# Patient Record
Sex: FEMALE | Race: WHITE
[De-identification: names, ages, dates, MRNs, and addresses within clinical notes are randomized per-mention and may not be internally consistent; named-entity substitution may affect disease eponyms.]

---

## 2017-01-03 ENCOUNTER — APPOINTMENT (OUTPATIENT)
Dept: OBGYN | Facility: CLINIC | Age: 47
End: 2017-01-03

## 2017-01-17 ENCOUNTER — APPOINTMENT (OUTPATIENT)
Dept: OBGYN | Facility: CLINIC | Age: 47
End: 2017-01-17

## 2017-01-18 ENCOUNTER — APPOINTMENT (OUTPATIENT)
Dept: INTERNAL MEDICINE | Facility: CLINIC | Age: 47
End: 2017-01-18

## 2017-01-24 ENCOUNTER — RESULT CHARGE (OUTPATIENT)
Age: 47
End: 2017-01-24

## 2017-01-24 ENCOUNTER — APPOINTMENT (OUTPATIENT)
Dept: OBGYN | Facility: CLINIC | Age: 47
End: 2017-01-24

## 2017-01-24 VITALS
WEIGHT: 151 LBS | SYSTOLIC BLOOD PRESSURE: 120 MMHG | BODY MASS INDEX: 25.78 KG/M2 | HEIGHT: 64 IN | DIASTOLIC BLOOD PRESSURE: 80 MMHG

## 2017-01-24 LAB
HCG UR QL: NEGATIVE
QUALITY CONTROL: YES

## 2017-01-24 RX ORDER — MEDROXYPROGESTERONE ACETATE 150 MG/ML
150 INJECTION, SUSPENSION INTRAMUSCULAR
Qty: 0 | Refills: 0 | Status: COMPLETED | OUTPATIENT
Start: 2017-01-24

## 2017-01-24 RX ADMIN — MEDROXYPROGESTERONE ACETATE 0 MG/ML: 150 INJECTION, SUSPENSION INTRAMUSCULAR at 00:00

## 2017-02-25 ENCOUNTER — APPOINTMENT (OUTPATIENT)
Dept: INTERNAL MEDICINE | Facility: CLINIC | Age: 47
End: 2017-02-25

## 2017-02-25 VITALS
HEART RATE: 88 BPM | WEIGHT: 249 LBS | BODY MASS INDEX: 42.51 KG/M2 | DIASTOLIC BLOOD PRESSURE: 90 MMHG | HEIGHT: 64 IN | SYSTOLIC BLOOD PRESSURE: 140 MMHG

## 2017-03-08 ENCOUNTER — APPOINTMENT (OUTPATIENT)
Dept: ENDOCRINOLOGY | Facility: CLINIC | Age: 47
End: 2017-03-08

## 2017-03-08 VITALS — HEIGHT: 64 IN | WEIGHT: 250 LBS | BODY MASS INDEX: 42.68 KG/M2

## 2017-04-11 ENCOUNTER — APPOINTMENT (OUTPATIENT)
Dept: OBGYN | Facility: CLINIC | Age: 47
End: 2017-04-11

## 2017-04-27 ENCOUNTER — APPOINTMENT (OUTPATIENT)
Dept: INTERNAL MEDICINE | Facility: CLINIC | Age: 47
End: 2017-04-27

## 2017-05-11 ENCOUNTER — APPOINTMENT (OUTPATIENT)
Dept: ENDOCRINOLOGY | Facility: CLINIC | Age: 47
End: 2017-05-11

## 2017-05-15 ENCOUNTER — APPOINTMENT (OUTPATIENT)
Dept: INTERNAL MEDICINE | Facility: CLINIC | Age: 47
End: 2017-05-15

## 2017-05-15 ENCOUNTER — RX RENEWAL (OUTPATIENT)
Age: 47
End: 2017-05-15

## 2017-06-06 ENCOUNTER — OUTPATIENT (OUTPATIENT)
Dept: OUTPATIENT SERVICES | Facility: HOSPITAL | Age: 47
LOS: 1 days | Discharge: HOME | End: 2017-06-06

## 2017-06-06 DIAGNOSIS — E11.9 TYPE 2 DIABETES MELLITUS WITHOUT COMPLICATIONS: ICD-10-CM

## 2017-06-06 DIAGNOSIS — F20.9 SCHIZOPHRENIA, UNSPECIFIED: ICD-10-CM

## 2017-06-06 DIAGNOSIS — F93.0 SEPARATION ANXIETY DISORDER OF CHILDHOOD: ICD-10-CM

## 2017-06-06 DIAGNOSIS — E13.10 OTHER SPECIFIED DIABETES MELLITUS WITH KETOACIDOSIS WITHOUT COMA: ICD-10-CM

## 2017-06-06 DIAGNOSIS — R41.82 ALTERED MENTAL STATUS, UNSPECIFIED: ICD-10-CM

## 2017-06-28 DIAGNOSIS — Z79.899 OTHER LONG TERM (CURRENT) DRUG THERAPY: ICD-10-CM

## 2017-06-28 DIAGNOSIS — F20.9 SCHIZOPHRENIA, UNSPECIFIED: ICD-10-CM

## 2017-07-05 ENCOUNTER — OUTPATIENT (OUTPATIENT)
Dept: OUTPATIENT SERVICES | Facility: HOSPITAL | Age: 47
LOS: 1 days | Discharge: HOME | End: 2017-07-05

## 2017-07-05 DIAGNOSIS — F20.9 SCHIZOPHRENIA, UNSPECIFIED: ICD-10-CM

## 2017-07-05 DIAGNOSIS — Z79.899 OTHER LONG TERM (CURRENT) DRUG THERAPY: ICD-10-CM

## 2017-07-05 DIAGNOSIS — R41.82 ALTERED MENTAL STATUS, UNSPECIFIED: ICD-10-CM

## 2017-07-05 DIAGNOSIS — E13.10 OTHER SPECIFIED DIABETES MELLITUS WITH KETOACIDOSIS WITHOUT COMA: ICD-10-CM

## 2017-07-05 DIAGNOSIS — F93.0 SEPARATION ANXIETY DISORDER OF CHILDHOOD: ICD-10-CM

## 2017-07-05 DIAGNOSIS — E11.9 TYPE 2 DIABETES MELLITUS WITHOUT COMPLICATIONS: ICD-10-CM

## 2017-07-19 ENCOUNTER — APPOINTMENT (OUTPATIENT)
Dept: INTERNAL MEDICINE | Facility: CLINIC | Age: 47
End: 2017-07-19

## 2017-07-19 ENCOUNTER — OUTPATIENT (OUTPATIENT)
Dept: OUTPATIENT SERVICES | Facility: HOSPITAL | Age: 47
LOS: 1 days | Discharge: HOME | End: 2017-07-19

## 2017-07-19 VITALS
WEIGHT: 227 LBS | SYSTOLIC BLOOD PRESSURE: 121 MMHG | HEART RATE: 105 BPM | BODY MASS INDEX: 38.76 KG/M2 | HEIGHT: 64 IN | DIASTOLIC BLOOD PRESSURE: 81 MMHG

## 2017-07-19 DIAGNOSIS — E11.9 TYPE 2 DIABETES MELLITUS WITHOUT COMPLICATIONS: ICD-10-CM

## 2017-07-19 DIAGNOSIS — F20.9 SCHIZOPHRENIA, UNSPECIFIED: ICD-10-CM

## 2017-07-19 DIAGNOSIS — E13.10 OTHER SPECIFIED DIABETES MELLITUS WITH KETOACIDOSIS WITHOUT COMA: ICD-10-CM

## 2017-07-19 DIAGNOSIS — I10 ESSENTIAL (PRIMARY) HYPERTENSION: ICD-10-CM

## 2017-07-19 DIAGNOSIS — R41.82 ALTERED MENTAL STATUS, UNSPECIFIED: ICD-10-CM

## 2017-07-19 DIAGNOSIS — F93.0 SEPARATION ANXIETY DISORDER OF CHILDHOOD: ICD-10-CM

## 2017-07-19 RX ORDER — INSULIN LISPRO 100 [IU]/ML
(75-25) 100 INJECTION, SUSPENSION SUBCUTANEOUS
Qty: 1 | Refills: 5 | Status: DISCONTINUED | COMMUNITY
Start: 2017-03-08 | End: 2017-07-19

## 2017-07-20 DIAGNOSIS — E11.65 TYPE 2 DIABETES MELLITUS WITH HYPERGLYCEMIA: ICD-10-CM

## 2017-07-20 DIAGNOSIS — F20.0 PARANOID SCHIZOPHRENIA: ICD-10-CM

## 2017-07-26 ENCOUNTER — RX RENEWAL (OUTPATIENT)
Age: 47
End: 2017-07-26

## 2017-07-26 RX ORDER — METFORMIN HYDROCHLORIDE 1000 MG/1
1000 TABLET, COATED ORAL
Qty: 60 | Refills: 5 | Status: DISCONTINUED | COMMUNITY
Start: 2017-03-08 | End: 2017-07-26

## 2017-07-28 ENCOUNTER — APPOINTMENT (OUTPATIENT)
Dept: OBGYN | Facility: CLINIC | Age: 47
End: 2017-07-28

## 2017-07-28 ENCOUNTER — OUTPATIENT (OUTPATIENT)
Dept: OUTPATIENT SERVICES | Facility: HOSPITAL | Age: 47
LOS: 1 days | Discharge: HOME | End: 2017-07-28

## 2017-07-28 ENCOUNTER — RESULT CHARGE (OUTPATIENT)
Age: 47
End: 2017-07-28

## 2017-07-28 VITALS
HEIGHT: 63 IN | BODY MASS INDEX: 40.04 KG/M2 | SYSTOLIC BLOOD PRESSURE: 100 MMHG | WEIGHT: 226 LBS | DIASTOLIC BLOOD PRESSURE: 72 MMHG

## 2017-07-28 DIAGNOSIS — F20.9 SCHIZOPHRENIA, UNSPECIFIED: ICD-10-CM

## 2017-07-28 DIAGNOSIS — E11.9 TYPE 2 DIABETES MELLITUS WITHOUT COMPLICATIONS: ICD-10-CM

## 2017-07-28 DIAGNOSIS — R41.82 ALTERED MENTAL STATUS, UNSPECIFIED: ICD-10-CM

## 2017-07-28 DIAGNOSIS — F93.0 SEPARATION ANXIETY DISORDER OF CHILDHOOD: ICD-10-CM

## 2017-07-28 DIAGNOSIS — E13.10 OTHER SPECIFIED DIABETES MELLITUS WITH KETOACIDOSIS WITHOUT COMA: ICD-10-CM

## 2017-07-29 LAB
HCG UR QL: NEGATIVE
QUALITY CONTROL: YES

## 2017-08-02 ENCOUNTER — OUTPATIENT (OUTPATIENT)
Dept: OUTPATIENT SERVICES | Facility: HOSPITAL | Age: 47
LOS: 1 days | Discharge: HOME | End: 2017-08-02

## 2017-08-02 DIAGNOSIS — F20.9 SCHIZOPHRENIA, UNSPECIFIED: ICD-10-CM

## 2017-08-02 DIAGNOSIS — Z79.899 OTHER LONG TERM (CURRENT) DRUG THERAPY: ICD-10-CM

## 2017-08-02 DIAGNOSIS — F93.0 SEPARATION ANXIETY DISORDER OF CHILDHOOD: ICD-10-CM

## 2017-08-02 DIAGNOSIS — E13.10 OTHER SPECIFIED DIABETES MELLITUS WITH KETOACIDOSIS WITHOUT COMA: ICD-10-CM

## 2017-08-02 DIAGNOSIS — R41.82 ALTERED MENTAL STATUS, UNSPECIFIED: ICD-10-CM

## 2017-08-02 DIAGNOSIS — E11.9 TYPE 2 DIABETES MELLITUS WITHOUT COMPLICATIONS: ICD-10-CM

## 2017-08-03 LAB
A VAGINAE DNA VAG NAA+PROBE-LOG#: NOT DETECTED
BV SCORE VAG QL NAA+PROBE: ABNORMAL
C GLABRATA DNA VAG QL NAA+PROBE: DETECTED
C PARAP DNA VAG QL NAA+PROBE: NOT DETECTED
C TRACH RRNA SPEC QL NAA+PROBE: NOT DETECTED
C TROPICLS DNA VAG QL NAA+PROBE: NOT DETECTED
CANDIDA DNA VAG QL NAA+PROBE: DETECTED
G VAGINALIS DNA VAG NAA+PROBE-LOG#: 6.6
LACTOBACILLUS DNA VAG NAA+PROBE-LOG#: 7
MEGASPHAERA SP DNA VAG NAA+PROBE-LOG#: NOT DETECTED
N GONORRHOEA RRNA SPEC QL NAA+PROBE: NOT DETECTED
T VAGINALIS RRNA SPEC QL NAA+PROBE: NOT DETECTED

## 2017-08-25 ENCOUNTER — RX RENEWAL (OUTPATIENT)
Age: 47
End: 2017-08-25

## 2017-08-25 RX ORDER — METFORMIN HYDROCHLORIDE 1000 MG/1
1000 TABLET, EXTENDED RELEASE ORAL
Qty: 30 | Refills: 3 | Status: DISCONTINUED | COMMUNITY
Start: 2017-07-26 | End: 2017-08-25

## 2017-08-30 ENCOUNTER — OUTPATIENT (OUTPATIENT)
Dept: OUTPATIENT SERVICES | Facility: HOSPITAL | Age: 47
LOS: 1 days | Discharge: HOME | End: 2017-08-30

## 2017-08-30 DIAGNOSIS — E11.9 TYPE 2 DIABETES MELLITUS WITHOUT COMPLICATIONS: ICD-10-CM

## 2017-08-30 DIAGNOSIS — F20.9 SCHIZOPHRENIA, UNSPECIFIED: ICD-10-CM

## 2017-08-30 DIAGNOSIS — Z79.899 OTHER LONG TERM (CURRENT) DRUG THERAPY: ICD-10-CM

## 2017-08-30 DIAGNOSIS — E13.10 OTHER SPECIFIED DIABETES MELLITUS WITH KETOACIDOSIS WITHOUT COMA: ICD-10-CM

## 2017-08-30 DIAGNOSIS — F93.0 SEPARATION ANXIETY DISORDER OF CHILDHOOD: ICD-10-CM

## 2017-08-30 DIAGNOSIS — R41.82 ALTERED MENTAL STATUS, UNSPECIFIED: ICD-10-CM

## 2017-09-06 ENCOUNTER — OUTPATIENT (OUTPATIENT)
Dept: OUTPATIENT SERVICES | Facility: HOSPITAL | Age: 47
LOS: 1 days | Discharge: HOME | End: 2017-09-06

## 2017-09-06 ENCOUNTER — APPOINTMENT (OUTPATIENT)
Dept: ENDOCRINOLOGY | Facility: CLINIC | Age: 47
End: 2017-09-06

## 2017-09-06 DIAGNOSIS — F93.0 SEPARATION ANXIETY DISORDER OF CHILDHOOD: ICD-10-CM

## 2017-09-06 DIAGNOSIS — F20.9 SCHIZOPHRENIA, UNSPECIFIED: ICD-10-CM

## 2017-09-06 DIAGNOSIS — E11.9 TYPE 2 DIABETES MELLITUS WITHOUT COMPLICATIONS: ICD-10-CM

## 2017-09-06 DIAGNOSIS — R41.82 ALTERED MENTAL STATUS, UNSPECIFIED: ICD-10-CM

## 2017-09-06 DIAGNOSIS — Z12.31 ENCOUNTER FOR SCREENING MAMMOGRAM FOR MALIGNANT NEOPLASM OF BREAST: ICD-10-CM

## 2017-09-06 DIAGNOSIS — E13.10 OTHER SPECIFIED DIABETES MELLITUS WITH KETOACIDOSIS WITHOUT COMA: ICD-10-CM

## 2017-09-12 ENCOUNTER — FORM ENCOUNTER (OUTPATIENT)
Age: 47
End: 2017-09-12

## 2017-09-13 ENCOUNTER — OUTPATIENT (OUTPATIENT)
Dept: OUTPATIENT SERVICES | Facility: HOSPITAL | Age: 47
LOS: 1 days | Discharge: HOME | End: 2017-09-13

## 2017-09-13 DIAGNOSIS — F93.0 SEPARATION ANXIETY DISORDER OF CHILDHOOD: ICD-10-CM

## 2017-09-13 DIAGNOSIS — R92.8 OTHER ABNORMAL AND INCONCLUSIVE FINDINGS ON DIAGNOSTIC IMAGING OF BREAST: ICD-10-CM

## 2017-09-13 DIAGNOSIS — E13.10 OTHER SPECIFIED DIABETES MELLITUS WITH KETOACIDOSIS WITHOUT COMA: ICD-10-CM

## 2017-09-13 DIAGNOSIS — F20.9 SCHIZOPHRENIA, UNSPECIFIED: ICD-10-CM

## 2017-09-13 DIAGNOSIS — R41.82 ALTERED MENTAL STATUS, UNSPECIFIED: ICD-10-CM

## 2017-09-13 DIAGNOSIS — E11.9 TYPE 2 DIABETES MELLITUS WITHOUT COMPLICATIONS: ICD-10-CM

## 2017-09-27 ENCOUNTER — OUTPATIENT (OUTPATIENT)
Dept: OUTPATIENT SERVICES | Facility: HOSPITAL | Age: 47
LOS: 1 days | Discharge: HOME | End: 2017-09-27

## 2017-09-27 DIAGNOSIS — F20.9 SCHIZOPHRENIA, UNSPECIFIED: ICD-10-CM

## 2017-09-27 DIAGNOSIS — E13.10 OTHER SPECIFIED DIABETES MELLITUS WITH KETOACIDOSIS WITHOUT COMA: ICD-10-CM

## 2017-09-27 DIAGNOSIS — F93.0 SEPARATION ANXIETY DISORDER OF CHILDHOOD: ICD-10-CM

## 2017-09-27 DIAGNOSIS — E11.9 TYPE 2 DIABETES MELLITUS WITHOUT COMPLICATIONS: ICD-10-CM

## 2017-09-27 DIAGNOSIS — R41.82 ALTERED MENTAL STATUS, UNSPECIFIED: ICD-10-CM

## 2017-09-28 ENCOUNTER — EMERGENCY (EMERGENCY)
Facility: HOSPITAL | Age: 47
LOS: 0 days | Discharge: HOME | End: 2017-09-28
Admitting: DERMATOLOGY

## 2017-09-28 DIAGNOSIS — E78.00 PURE HYPERCHOLESTEROLEMIA, UNSPECIFIED: ICD-10-CM

## 2017-09-28 DIAGNOSIS — Z88.6 ALLERGY STATUS TO ANALGESIC AGENT: ICD-10-CM

## 2017-09-28 DIAGNOSIS — R41.82 ALTERED MENTAL STATUS, UNSPECIFIED: ICD-10-CM

## 2017-09-28 DIAGNOSIS — F20.9 SCHIZOPHRENIA, UNSPECIFIED: ICD-10-CM

## 2017-09-28 DIAGNOSIS — F32.9 MAJOR DEPRESSIVE DISORDER, SINGLE EPISODE, UNSPECIFIED: ICD-10-CM

## 2017-09-28 DIAGNOSIS — E11.9 TYPE 2 DIABETES MELLITUS WITHOUT COMPLICATIONS: ICD-10-CM

## 2017-09-28 DIAGNOSIS — E11.65 TYPE 2 DIABETES MELLITUS WITH HYPERGLYCEMIA: ICD-10-CM

## 2017-09-28 DIAGNOSIS — Z88.8 ALLERGY STATUS TO OTHER DRUGS, MEDICAMENTS AND BIOLOGICAL SUBSTANCES STATUS: ICD-10-CM

## 2017-09-28 DIAGNOSIS — F93.0 SEPARATION ANXIETY DISORDER OF CHILDHOOD: ICD-10-CM

## 2017-09-28 DIAGNOSIS — Z88.5 ALLERGY STATUS TO NARCOTIC AGENT: ICD-10-CM

## 2017-09-28 DIAGNOSIS — E13.10 OTHER SPECIFIED DIABETES MELLITUS WITH KETOACIDOSIS WITHOUT COMA: ICD-10-CM

## 2017-09-28 DIAGNOSIS — Z87.891 PERSONAL HISTORY OF NICOTINE DEPENDENCE: ICD-10-CM

## 2017-09-28 DIAGNOSIS — Z88.0 ALLERGY STATUS TO PENICILLIN: ICD-10-CM

## 2017-10-05 DIAGNOSIS — Z79.899 OTHER LONG TERM (CURRENT) DRUG THERAPY: ICD-10-CM

## 2017-10-05 DIAGNOSIS — F20.9 SCHIZOPHRENIA, UNSPECIFIED: ICD-10-CM

## 2017-10-20 ENCOUNTER — OUTPATIENT (OUTPATIENT)
Dept: OUTPATIENT SERVICES | Facility: HOSPITAL | Age: 47
LOS: 1 days | Discharge: HOME | End: 2017-10-20

## 2017-10-20 ENCOUNTER — APPOINTMENT (OUTPATIENT)
Dept: OBGYN | Facility: CLINIC | Age: 47
End: 2017-10-20

## 2017-10-20 VITALS
WEIGHT: 217.2 LBS | HEIGHT: 63 IN | BODY MASS INDEX: 38.48 KG/M2 | DIASTOLIC BLOOD PRESSURE: 70 MMHG | SYSTOLIC BLOOD PRESSURE: 100 MMHG

## 2017-10-20 DIAGNOSIS — F93.0 SEPARATION ANXIETY DISORDER OF CHILDHOOD: ICD-10-CM

## 2017-10-20 DIAGNOSIS — E13.10 OTHER SPECIFIED DIABETES MELLITUS WITH KETOACIDOSIS WITHOUT COMA: ICD-10-CM

## 2017-10-20 DIAGNOSIS — E11.9 TYPE 2 DIABETES MELLITUS WITHOUT COMPLICATIONS: ICD-10-CM

## 2017-10-20 DIAGNOSIS — R41.82 ALTERED MENTAL STATUS, UNSPECIFIED: ICD-10-CM

## 2017-10-20 DIAGNOSIS — F20.9 SCHIZOPHRENIA, UNSPECIFIED: ICD-10-CM

## 2017-10-23 ENCOUNTER — EMERGENCY (EMERGENCY)
Facility: HOSPITAL | Age: 47
LOS: 1 days | Discharge: HOME | End: 2017-10-23
Admitting: DERMATOLOGY

## 2017-10-23 ENCOUNTER — MOBILE ON CALL (OUTPATIENT)
Age: 47
End: 2017-10-23

## 2017-10-23 DIAGNOSIS — E11.9 TYPE 2 DIABETES MELLITUS WITHOUT COMPLICATIONS: ICD-10-CM

## 2017-10-23 DIAGNOSIS — Z02.9 ENCOUNTER FOR ADMINISTRATIVE EXAMINATIONS, UNSPECIFIED: ICD-10-CM

## 2017-10-23 DIAGNOSIS — Z88.0 ALLERGY STATUS TO PENICILLIN: ICD-10-CM

## 2017-10-23 DIAGNOSIS — E11.65 TYPE 2 DIABETES MELLITUS WITH HYPERGLYCEMIA: ICD-10-CM

## 2017-10-23 DIAGNOSIS — Z88.8 ALLERGY STATUS TO OTHER DRUGS, MEDICAMENTS AND BIOLOGICAL SUBSTANCES: ICD-10-CM

## 2017-10-23 DIAGNOSIS — F20.9 SCHIZOPHRENIA, UNSPECIFIED: ICD-10-CM

## 2017-10-23 DIAGNOSIS — E78.00 PURE HYPERCHOLESTEROLEMIA, UNSPECIFIED: ICD-10-CM

## 2017-10-23 DIAGNOSIS — R00.0 TACHYCARDIA, UNSPECIFIED: ICD-10-CM

## 2017-10-23 DIAGNOSIS — J45.909 UNSPECIFIED ASTHMA, UNCOMPLICATED: ICD-10-CM

## 2017-10-23 DIAGNOSIS — R41.82 ALTERED MENTAL STATUS, UNSPECIFIED: ICD-10-CM

## 2017-10-23 DIAGNOSIS — F93.0 SEPARATION ANXIETY DISORDER OF CHILDHOOD: ICD-10-CM

## 2017-10-23 DIAGNOSIS — Z88.6 ALLERGY STATUS TO ANALGESIC AGENT: ICD-10-CM

## 2017-10-23 DIAGNOSIS — Z79.899 OTHER LONG TERM (CURRENT) DRUG THERAPY: ICD-10-CM

## 2017-10-23 DIAGNOSIS — E13.10 OTHER SPECIFIED DIABETES MELLITUS WITH KETOACIDOSIS WITHOUT COMA: ICD-10-CM

## 2017-10-23 DIAGNOSIS — F32.9 MAJOR DEPRESSIVE DISORDER, SINGLE EPISODE, UNSPECIFIED: ICD-10-CM

## 2017-10-23 DIAGNOSIS — Z79.84 LONG TERM (CURRENT) USE OF ORAL HYPOGLYCEMIC DRUGS: ICD-10-CM

## 2017-10-23 DIAGNOSIS — I10 ESSENTIAL (PRIMARY) HYPERTENSION: ICD-10-CM

## 2017-10-24 LAB
ALBUMIN SERPL-MCNC: 4.1 G/DL
ALBUMIN/GLOB SERPL: 1.41
ALP SERPL-CCNC: 93 IU/L
ALT SERPL-CCNC: 18 IU/L
ANION GAP SERPL CALC-SCNC: 15 MEQ/L
AST SERPL-CCNC: 19 IU/L
BASOPHILS # BLD: 0.04 TH/MM3
BASOPHILS NFR BLD: 0.4 %
BILIRUB SERPL-MCNC: 0.7 MG/DL
BUN SERPL-MCNC: 11 MG/DL
BUN/CREAT SERPL: 10.6 %
CALCIUM SERPL-MCNC: 10.4 MG/DL
CHLORIDE SERPL-SCNC: 99 MEQ/L
CHOLEST SERPL-MCNC: 250 MG/DL
CO2 SERPL-SCNC: 21 MEQ/L
CREAT SERPL-MCNC: 1.04 MG/DL
CREAT UR-MCNC: 95 MG/DL
DIFFERENTIAL METHOD BLD: NORMAL
EOSINOPHIL # BLD: 0.22 TH/MM3
EOSINOPHIL NFR BLD: 2 %
ERYTHROCYTE [DISTWIDTH] IN BLOOD BY AUTOMATED COUNT: 13.7 %
ESTIMATED AVERGAGE GLUCOSE (NORTH): 418 MG/DL
GFR SERPL CREATININE-BSD FRML MDRD: 57
GLUCOSE SERPL-MCNC: 424 MG/DL
GRANULOCYTES # BLD: 7.58 TH/MM3
GRANULOCYTES NFR BLD: 68.2 %
HBA1C MFR BLD: 16.2 %
HCT VFR BLD AUTO: 44.4 %
HCV AB S/CO SERPL IA: 0.1 S/CO
HCV AB SER QL: NONREACTIVE
HDLC SERPL-MCNC: 44 MG/DL
HDLC SERPL: 5.68
HGB BLD-MCNC: 14.5 G/DL
IMM GRANULOCYTES # BLD: 0.05 TH/MM3
IMM GRANULOCYTES NFR BLD: 0.5 %
LDLC SERPL DIRECT ASSAY-MCNC: 131 MG/DL
LYMPHOCYTES # BLD: 2.61 TH/MM3
LYMPHOCYTES NFR BLD: 23.5 %
MCH RBC QN AUTO: 28.7 PG
MCHC RBC AUTO-ENTMCNC: 32.7 G/DL
MCV RBC AUTO: 87.9 FL
MICROALBUMIN UR-MCNC: 3 MG/DL
MICROALBUMIN/CREAT UR-RTO: 32 MG/G
MONOCYTES # BLD: 0.6 TH/MM3
MONOCYTES NFR BLD: 5.4 %
PLATELET # BLD: 431 TH/MM3
PMV BLD AUTO: 10.8 FL
POTASSIUM SERPL-SCNC: 4.5 MMOL/L
PROT SERPL-MCNC: 7 G/DL
RBC # BLD AUTO: 5.05 MIL/MM3
SODIUM SERPL-SCNC: 135 MEQ/L
TRIGL SERPL-MCNC: 424 MG/DL
VLDLC SERPL-MCNC: 84 MG/DL
WBC # BLD: 11.1 TH/MM3

## 2017-10-25 ENCOUNTER — APPOINTMENT (OUTPATIENT)
Dept: INTERNAL MEDICINE | Facility: CLINIC | Age: 47
End: 2017-10-25

## 2017-10-25 ENCOUNTER — RESULT CHARGE (OUTPATIENT)
Age: 47
End: 2017-10-25

## 2017-10-25 ENCOUNTER — OUTPATIENT (OUTPATIENT)
Dept: OUTPATIENT SERVICES | Facility: HOSPITAL | Age: 47
LOS: 1 days | Discharge: HOME | End: 2017-10-25

## 2017-10-25 VITALS
BODY MASS INDEX: 38.09 KG/M2 | HEART RATE: 128 BPM | DIASTOLIC BLOOD PRESSURE: 82 MMHG | HEIGHT: 63 IN | WEIGHT: 215 LBS | SYSTOLIC BLOOD PRESSURE: 116 MMHG

## 2017-10-25 DIAGNOSIS — E11.9 TYPE 2 DIABETES MELLITUS WITHOUT COMPLICATIONS: ICD-10-CM

## 2017-10-25 DIAGNOSIS — R41.82 ALTERED MENTAL STATUS, UNSPECIFIED: ICD-10-CM

## 2017-10-25 DIAGNOSIS — E13.10 OTHER SPECIFIED DIABETES MELLITUS WITH KETOACIDOSIS WITHOUT COMA: ICD-10-CM

## 2017-10-25 DIAGNOSIS — F20.9 SCHIZOPHRENIA, UNSPECIFIED: ICD-10-CM

## 2017-10-25 DIAGNOSIS — F93.0 SEPARATION ANXIETY DISORDER OF CHILDHOOD: ICD-10-CM

## 2017-10-25 RX ORDER — FLUCONAZOLE 150 MG/1
150 TABLET ORAL
Qty: 2 | Refills: 2 | Status: DISCONTINUED | COMMUNITY
Start: 2017-10-20 | End: 2017-10-25

## 2017-10-25 RX ORDER — DOCUSATE SODIUM 100 MG/1
100 CAPSULE ORAL
Qty: 30 | Refills: 3 | Status: ACTIVE | COMMUNITY
Start: 2017-10-25 | End: 1900-01-01

## 2017-10-26 ENCOUNTER — RESULT REVIEW (OUTPATIENT)
Age: 47
End: 2017-10-26

## 2017-10-26 ENCOUNTER — APPOINTMENT (OUTPATIENT)
Dept: ENDOCRINOLOGY | Facility: CLINIC | Age: 47
End: 2017-10-26

## 2017-10-26 ENCOUNTER — OUTPATIENT (OUTPATIENT)
Dept: OUTPATIENT SERVICES | Facility: HOSPITAL | Age: 47
LOS: 1 days | Discharge: HOME | End: 2017-10-26

## 2017-10-26 VITALS
BODY MASS INDEX: 38.09 KG/M2 | HEART RATE: 105 BPM | SYSTOLIC BLOOD PRESSURE: 111 MMHG | WEIGHT: 215 LBS | DIASTOLIC BLOOD PRESSURE: 80 MMHG | HEIGHT: 63 IN

## 2017-10-26 DIAGNOSIS — F20.9 SCHIZOPHRENIA, UNSPECIFIED: ICD-10-CM

## 2017-10-26 DIAGNOSIS — E11.65 TYPE 2 DIABETES MELLITUS WITH HYPERGLYCEMIA: ICD-10-CM

## 2017-10-26 DIAGNOSIS — Z23 ENCOUNTER FOR IMMUNIZATION: ICD-10-CM

## 2017-10-26 DIAGNOSIS — E11.9 TYPE 2 DIABETES MELLITUS WITHOUT COMPLICATIONS: ICD-10-CM

## 2017-10-26 DIAGNOSIS — E13.10 OTHER SPECIFIED DIABETES MELLITUS WITH KETOACIDOSIS WITHOUT COMA: ICD-10-CM

## 2017-10-26 DIAGNOSIS — I10 ESSENTIAL (PRIMARY) HYPERTENSION: ICD-10-CM

## 2017-10-26 DIAGNOSIS — R41.82 ALTERED MENTAL STATUS, UNSPECIFIED: ICD-10-CM

## 2017-10-26 DIAGNOSIS — F93.0 SEPARATION ANXIETY DISORDER OF CHILDHOOD: ICD-10-CM

## 2017-10-26 DIAGNOSIS — F20.0 PARANOID SCHIZOPHRENIA: ICD-10-CM

## 2017-10-26 LAB
25(OH)D3 SERPL-MCNC: 38 NG/ML
VITAMIN D2 SERPL-MCNC: 10 NG/ML
VITAMIN D3 SERPL-MCNC: 28 NG/ML

## 2017-10-26 RX ORDER — FLUPHENAZINE DECANOATE 25 MG/ML
25 INJECTION, SOLUTION INTRAMUSCULAR; SUBCUTANEOUS
Refills: 0 | Status: ACTIVE | COMMUNITY
Start: 2017-06-27

## 2017-10-26 RX ORDER — TERCONAZOLE 8 MG/G
0.8 CREAM VAGINAL
Qty: 5 | Refills: 2 | Status: COMPLETED | COMMUNITY
Start: 2017-07-28 | End: 2017-08-26

## 2017-10-27 LAB — GLUCOSE BLDC GLUCOMTR-MCNC: 295

## 2017-11-03 LAB — GLUCOSE SERPL-MCNC: 295 MG/DL

## 2017-11-06 DIAGNOSIS — E66.01 MORBID (SEVERE) OBESITY DUE TO EXCESS CALORIES: ICD-10-CM

## 2017-11-06 DIAGNOSIS — E11.65 TYPE 2 DIABETES MELLITUS WITH HYPERGLYCEMIA: ICD-10-CM

## 2017-11-10 ENCOUNTER — OTHER (OUTPATIENT)
Age: 47
End: 2017-11-10

## 2017-11-21 ENCOUNTER — OUTPATIENT (OUTPATIENT)
Dept: OUTPATIENT SERVICES | Facility: HOSPITAL | Age: 47
LOS: 1 days | Discharge: HOME | End: 2017-11-21

## 2017-11-21 DIAGNOSIS — F93.0 SEPARATION ANXIETY DISORDER OF CHILDHOOD: ICD-10-CM

## 2017-11-21 DIAGNOSIS — E11.9 TYPE 2 DIABETES MELLITUS WITHOUT COMPLICATIONS: ICD-10-CM

## 2017-11-21 DIAGNOSIS — F20.9 SCHIZOPHRENIA, UNSPECIFIED: ICD-10-CM

## 2017-11-21 DIAGNOSIS — Z79.899 OTHER LONG TERM (CURRENT) DRUG THERAPY: ICD-10-CM

## 2017-11-21 DIAGNOSIS — R41.82 ALTERED MENTAL STATUS, UNSPECIFIED: ICD-10-CM

## 2017-11-21 DIAGNOSIS — E13.10 OTHER SPECIFIED DIABETES MELLITUS WITH KETOACIDOSIS WITHOUT COMA: ICD-10-CM

## 2017-12-08 ENCOUNTER — APPOINTMENT (OUTPATIENT)
Dept: PODIATRY | Facility: CLINIC | Age: 47
End: 2017-12-08

## 2017-12-15 RX ORDER — DULAGLUTIDE 0.75 MG/.5ML
0.75 INJECTION, SOLUTION SUBCUTANEOUS
Qty: 1 | Refills: 5 | Status: DISCONTINUED | COMMUNITY
Start: 2017-10-26 | End: 2017-12-15

## 2017-12-20 ENCOUNTER — OUTPATIENT (OUTPATIENT)
Dept: OUTPATIENT SERVICES | Facility: HOSPITAL | Age: 47
LOS: 1 days | Discharge: HOME | End: 2017-12-20

## 2017-12-20 DIAGNOSIS — E11.9 TYPE 2 DIABETES MELLITUS WITHOUT COMPLICATIONS: ICD-10-CM

## 2017-12-20 DIAGNOSIS — Z79.899 OTHER LONG TERM (CURRENT) DRUG THERAPY: ICD-10-CM

## 2017-12-20 DIAGNOSIS — F20.9 SCHIZOPHRENIA, UNSPECIFIED: ICD-10-CM

## 2017-12-20 DIAGNOSIS — E13.10 OTHER SPECIFIED DIABETES MELLITUS WITH KETOACIDOSIS WITHOUT COMA: ICD-10-CM

## 2017-12-20 DIAGNOSIS — R41.82 ALTERED MENTAL STATUS, UNSPECIFIED: ICD-10-CM

## 2017-12-20 DIAGNOSIS — F93.0 SEPARATION ANXIETY DISORDER OF CHILDHOOD: ICD-10-CM

## 2018-01-17 ENCOUNTER — OUTPATIENT (OUTPATIENT)
Dept: OUTPATIENT SERVICES | Facility: HOSPITAL | Age: 48
LOS: 1 days | Discharge: HOME | End: 2018-01-17

## 2018-01-17 DIAGNOSIS — F93.0 SEPARATION ANXIETY DISORDER OF CHILDHOOD: ICD-10-CM

## 2018-01-17 DIAGNOSIS — R41.82 ALTERED MENTAL STATUS, UNSPECIFIED: ICD-10-CM

## 2018-01-17 DIAGNOSIS — Z79.899 OTHER LONG TERM (CURRENT) DRUG THERAPY: ICD-10-CM

## 2018-01-17 DIAGNOSIS — F20.9 SCHIZOPHRENIA, UNSPECIFIED: ICD-10-CM

## 2018-01-17 DIAGNOSIS — E11.9 TYPE 2 DIABETES MELLITUS WITHOUT COMPLICATIONS: ICD-10-CM

## 2018-01-17 DIAGNOSIS — E13.10 OTHER SPECIFIED DIABETES MELLITUS WITH KETOACIDOSIS WITHOUT COMA: ICD-10-CM

## 2018-01-19 ENCOUNTER — OUTPATIENT (OUTPATIENT)
Dept: OUTPATIENT SERVICES | Facility: HOSPITAL | Age: 48
LOS: 1 days | Discharge: HOME | End: 2018-01-19

## 2018-01-19 ENCOUNTER — APPOINTMENT (OUTPATIENT)
Dept: OBGYN | Facility: CLINIC | Age: 48
End: 2018-01-19

## 2018-01-19 VITALS
DIASTOLIC BLOOD PRESSURE: 80 MMHG | SYSTOLIC BLOOD PRESSURE: 110 MMHG | WEIGHT: 220.6 LBS | HEIGHT: 63 IN | BODY MASS INDEX: 39.09 KG/M2

## 2018-01-19 DIAGNOSIS — E11.9 TYPE 2 DIABETES MELLITUS WITHOUT COMPLICATIONS: ICD-10-CM

## 2018-01-19 DIAGNOSIS — Z79.899 OTHER LONG TERM (CURRENT) DRUG THERAPY: ICD-10-CM

## 2018-01-19 DIAGNOSIS — R41.82 ALTERED MENTAL STATUS, UNSPECIFIED: ICD-10-CM

## 2018-01-19 DIAGNOSIS — F20.9 SCHIZOPHRENIA, UNSPECIFIED: ICD-10-CM

## 2018-01-19 DIAGNOSIS — E13.10 OTHER SPECIFIED DIABETES MELLITUS WITH KETOACIDOSIS WITHOUT COMA: ICD-10-CM

## 2018-01-19 DIAGNOSIS — Z30.9 ENCOUNTER FOR CONTRACEPTIVE MANAGEMENT, UNSPECIFIED: ICD-10-CM

## 2018-01-19 DIAGNOSIS — F93.0 SEPARATION ANXIETY DISORDER OF CHILDHOOD: ICD-10-CM

## 2018-01-19 RX ORDER — MEDROXYPROGESTERONE ACETATE 150 MG/ML
150 INJECTION, SUSPENSION INTRAMUSCULAR
Qty: 0 | Refills: 0 | Status: COMPLETED | OUTPATIENT
Start: 2018-01-19

## 2018-01-19 RX ADMIN — MEDROXYPROGESTERONE ACETATE 0 MG/ML: 150 INJECTION, SUSPENSION INTRAMUSCULAR at 00:00

## 2018-01-24 ENCOUNTER — APPOINTMENT (OUTPATIENT)
Dept: ENDOCRINOLOGY | Facility: CLINIC | Age: 48
End: 2018-01-24

## 2018-01-24 ENCOUNTER — RESULT REVIEW (OUTPATIENT)
Age: 48
End: 2018-01-24

## 2018-01-24 ENCOUNTER — OUTPATIENT (OUTPATIENT)
Dept: OUTPATIENT SERVICES | Facility: HOSPITAL | Age: 48
LOS: 1 days | Discharge: HOME | End: 2018-01-24

## 2018-01-24 VITALS
HEART RATE: 116 BPM | DIASTOLIC BLOOD PRESSURE: 78 MMHG | BODY MASS INDEX: 38.62 KG/M2 | SYSTOLIC BLOOD PRESSURE: 114 MMHG | WEIGHT: 218 LBS | HEIGHT: 63 IN

## 2018-01-24 LAB — CYTOLOGY CVX/VAG DOC THIN PREP: NORMAL

## 2018-01-24 RX ORDER — DULAGLUTIDE 1.5 MG/.5ML
1.5 INJECTION, SOLUTION SUBCUTANEOUS
Qty: 3 | Refills: 3 | Status: COMPLETED | COMMUNITY
Start: 2017-10-26 | End: 2018-01-24

## 2018-01-24 RX ORDER — GLIMEPIRIDE 1 MG/1
1 TABLET ORAL DAILY
Qty: 90 | Refills: 0 | Status: DISCONTINUED | COMMUNITY
Start: 2017-10-24 | End: 2018-01-24

## 2018-01-31 ENCOUNTER — APPOINTMENT (OUTPATIENT)
Dept: INTERNAL MEDICINE | Facility: CLINIC | Age: 48
End: 2018-01-31

## 2018-01-31 ENCOUNTER — OUTPATIENT (OUTPATIENT)
Dept: OUTPATIENT SERVICES | Facility: HOSPITAL | Age: 48
LOS: 1 days | Discharge: HOME | End: 2018-01-31

## 2018-01-31 VITALS
HEIGHT: 63 IN | WEIGHT: 219 LBS | HEART RATE: 111 BPM | BODY MASS INDEX: 38.8 KG/M2 | DIASTOLIC BLOOD PRESSURE: 85 MMHG | SYSTOLIC BLOOD PRESSURE: 126 MMHG

## 2018-02-01 DIAGNOSIS — E66.01 MORBID (SEVERE) OBESITY DUE TO EXCESS CALORIES: ICD-10-CM

## 2018-02-01 DIAGNOSIS — E78.2 MIXED HYPERLIPIDEMIA: ICD-10-CM

## 2018-02-01 DIAGNOSIS — E11.65 TYPE 2 DIABETES MELLITUS WITH HYPERGLYCEMIA: ICD-10-CM

## 2018-02-02 LAB
ALBUMIN SERPL-MCNC: 3.9 G/DL
ALBUMIN/GLOB SERPL: 1.3
ALP SERPL-CCNC: 87 IU/L
ALT SERPL-CCNC: 22 IU/L
ANION GAP SERPL CALC-SCNC: 11 MEQ/L
AST SERPL-CCNC: 22 IU/L
BASOPHILS # BLD: 0.04 TH/MM3
BASOPHILS NFR BLD: 0.3 %
BILIRUB SERPL-MCNC: 0.7 MG/DL
BUN SERPL-MCNC: 13 MG/DL
BUN/CREAT SERPL: 13.4 %
CALCIUM SERPL-MCNC: 10.2 MG/DL
CHLORIDE SERPL-SCNC: 102 MEQ/L
CHOLEST SERPL-MCNC: 152 MG/DL
CO2 SERPL-SCNC: 24 MEQ/L
CREAT SERPL-MCNC: 0.97 MG/DL
CREAT UR-MCNC: 354 MG/DL
DIFFERENTIAL METHOD BLD: NORMAL
EOSINOPHIL # BLD: 0.1 TH/MM3
EOSINOPHIL NFR BLD: 0.7 %
ERYTHROCYTE [DISTWIDTH] IN BLOOD BY AUTOMATED COUNT: 13.8 %
ESTIMATED AVERGAGE GLUCOSE (NORTH): 272 MG/DL
GFR SERPL CREATININE-BSD FRML MDRD: 62
GLUCOSE SERPL-MCNC: 197 MG/DL
GRANULOCYTES # BLD: 10.08 TH/MM3
GRANULOCYTES NFR BLD: 72 %
HBA1C MFR BLD: 11.1 %
HCT VFR BLD AUTO: 46.6 %
HDLC SERPL-MCNC: 40 MG/DL
HDLC SERPL: 3.8
HGB BLD-MCNC: 14.7 G/DL
IMM GRANULOCYTES # BLD: 0.04 TH/MM3
IMM GRANULOCYTES NFR BLD: 0.3 %
LDLC SERPL DIRECT ASSAY-MCNC: 77 MG/DL
LYMPHOCYTES # BLD: 3.05 TH/MM3
LYMPHOCYTES NFR BLD: 21.8 %
MCH RBC QN AUTO: 28.7 PG
MCHC RBC AUTO-ENTMCNC: 31.5 G/DL
MCV RBC AUTO: 90.8 FL
MICROALBUMIN UR-MCNC: 4.9 MG/DL
MICROALBUMIN/CREAT UR-RTO: 14 MG/G
MONOCYTES # BLD: 0.69 TH/MM3
MONOCYTES NFR BLD: 4.9 %
PLATELET # BLD: 455 TH/MM3
PMV BLD AUTO: 10.3 FL
POTASSIUM SERPL-SCNC: 4.3 MMOL/L
PROT SERPL-MCNC: 6.9 G/DL
RBC # BLD AUTO: 5.13 MIL/MM3
SODIUM SERPL-SCNC: 137 MEQ/L
TRIGL SERPL-MCNC: 226 MG/DL
VLDLC SERPL-MCNC: 45 MG/DL
WBC # BLD: 14 TH/MM3

## 2018-02-04 DIAGNOSIS — F20.9 SCHIZOPHRENIA, UNSPECIFIED: ICD-10-CM

## 2018-02-04 DIAGNOSIS — R41.82 ALTERED MENTAL STATUS, UNSPECIFIED: ICD-10-CM

## 2018-02-04 DIAGNOSIS — E13.10 OTHER SPECIFIED DIABETES MELLITUS WITH KETOACIDOSIS WITHOUT COMA: ICD-10-CM

## 2018-02-04 DIAGNOSIS — F93.0 SEPARATION ANXIETY DISORDER OF CHILDHOOD: ICD-10-CM

## 2018-02-04 DIAGNOSIS — E11.9 TYPE 2 DIABETES MELLITUS WITHOUT COMPLICATIONS: ICD-10-CM

## 2018-02-08 DIAGNOSIS — I10 ESSENTIAL (PRIMARY) HYPERTENSION: ICD-10-CM

## 2018-02-08 DIAGNOSIS — E11.65 TYPE 2 DIABETES MELLITUS WITH HYPERGLYCEMIA: ICD-10-CM

## 2018-02-14 ENCOUNTER — OUTPATIENT (OUTPATIENT)
Dept: OUTPATIENT SERVICES | Facility: HOSPITAL | Age: 48
LOS: 1 days | Discharge: HOME | End: 2018-02-14

## 2018-02-14 DIAGNOSIS — F20.9 SCHIZOPHRENIA, UNSPECIFIED: ICD-10-CM

## 2018-02-14 DIAGNOSIS — Z79.899 OTHER LONG TERM (CURRENT) DRUG THERAPY: ICD-10-CM

## 2018-02-26 ENCOUNTER — RX RENEWAL (OUTPATIENT)
Age: 48
End: 2018-02-26

## 2018-03-14 ENCOUNTER — OUTPATIENT (OUTPATIENT)
Dept: OUTPATIENT SERVICES | Facility: HOSPITAL | Age: 48
LOS: 1 days | Discharge: HOME | End: 2018-03-14

## 2018-03-14 DIAGNOSIS — F20.9 SCHIZOPHRENIA, UNSPECIFIED: ICD-10-CM

## 2018-03-14 DIAGNOSIS — Z79.899 OTHER LONG TERM (CURRENT) DRUG THERAPY: ICD-10-CM

## 2018-04-11 ENCOUNTER — OUTPATIENT (OUTPATIENT)
Dept: OUTPATIENT SERVICES | Facility: HOSPITAL | Age: 48
LOS: 1 days | Discharge: HOME | End: 2018-04-11

## 2018-04-11 ENCOUNTER — APPOINTMENT (OUTPATIENT)
Dept: INTERNAL MEDICINE | Facility: CLINIC | Age: 48
End: 2018-04-11

## 2018-04-11 VITALS
HEIGHT: 63 IN | WEIGHT: 224 LBS | DIASTOLIC BLOOD PRESSURE: 87 MMHG | HEART RATE: 116 BPM | BODY MASS INDEX: 39.69 KG/M2 | SYSTOLIC BLOOD PRESSURE: 124 MMHG

## 2018-04-11 DIAGNOSIS — Z79.899 OTHER LONG TERM (CURRENT) DRUG THERAPY: ICD-10-CM

## 2018-04-11 DIAGNOSIS — F20.9 SCHIZOPHRENIA, UNSPECIFIED: ICD-10-CM

## 2018-04-12 DIAGNOSIS — J06.9 ACUTE UPPER RESPIRATORY INFECTION, UNSPECIFIED: ICD-10-CM

## 2018-04-12 DIAGNOSIS — I10 ESSENTIAL (PRIMARY) HYPERTENSION: ICD-10-CM

## 2018-04-12 DIAGNOSIS — E11.65 TYPE 2 DIABETES MELLITUS WITH HYPERGLYCEMIA: ICD-10-CM

## 2018-04-20 ENCOUNTER — APPOINTMENT (OUTPATIENT)
Dept: OBGYN | Facility: CLINIC | Age: 48
End: 2018-04-20

## 2018-04-20 ENCOUNTER — OUTPATIENT (OUTPATIENT)
Dept: OUTPATIENT SERVICES | Facility: HOSPITAL | Age: 48
LOS: 1 days | Discharge: HOME | End: 2018-04-20

## 2018-04-20 VITALS
SYSTOLIC BLOOD PRESSURE: 110 MMHG | DIASTOLIC BLOOD PRESSURE: 78 MMHG | BODY MASS INDEX: 39.39 KG/M2 | WEIGHT: 222.38 LBS

## 2018-04-20 DIAGNOSIS — R05 COUGH: ICD-10-CM

## 2018-04-20 DIAGNOSIS — B37.3 CANDIDIASIS OF VULVA AND VAGINA: ICD-10-CM

## 2018-04-20 RX ORDER — CHOLECALCIFEROL (VITAMIN D3) 25 MCG
25 MCG TABLET,CHEWABLE ORAL
Qty: 30 | Refills: 3 | Status: ACTIVE | COMMUNITY
Start: 2018-04-20 | End: 1900-01-01

## 2018-04-20 RX ORDER — MEDROXYPROGESTERONE ACETATE 150 MG/ML
150 INJECTION, SUSPENSION INTRAMUSCULAR
Refills: 0 | Status: COMPLETED | OUTPATIENT
Start: 2018-04-20

## 2018-04-23 DIAGNOSIS — Z01.419 ENCOUNTER FOR GYNECOLOGICAL EXAMINATION (GENERAL) (ROUTINE) WITHOUT ABNORMAL FINDINGS: ICD-10-CM

## 2018-04-24 ENCOUNTER — APPOINTMENT (OUTPATIENT)
Dept: DERMATOLOGY | Facility: CLINIC | Age: 48
End: 2018-04-24

## 2018-04-27 ENCOUNTER — APPOINTMENT (OUTPATIENT)
Dept: ANTEPARTUM | Facility: CLINIC | Age: 48
End: 2018-04-27

## 2018-04-27 LAB — HPV HIGH+LOW RISK DNA PNL CVX: NOT DETECTED

## 2018-05-09 ENCOUNTER — OUTPATIENT (OUTPATIENT)
Dept: OUTPATIENT SERVICES | Facility: HOSPITAL | Age: 48
LOS: 1 days | Discharge: HOME | End: 2018-05-09

## 2018-05-09 DIAGNOSIS — F20.9 SCHIZOPHRENIA, UNSPECIFIED: ICD-10-CM

## 2018-05-09 DIAGNOSIS — Z79.899 OTHER LONG TERM (CURRENT) DRUG THERAPY: ICD-10-CM

## 2018-06-01 ENCOUNTER — RX RENEWAL (OUTPATIENT)
Age: 48
End: 2018-06-01

## 2018-06-05 ENCOUNTER — RX RENEWAL (OUTPATIENT)
Age: 48
End: 2018-06-05

## 2018-06-06 ENCOUNTER — OUTPATIENT (OUTPATIENT)
Dept: OUTPATIENT SERVICES | Facility: HOSPITAL | Age: 48
LOS: 1 days | Discharge: HOME | End: 2018-06-06

## 2018-06-06 DIAGNOSIS — Z79.899 OTHER LONG TERM (CURRENT) DRUG THERAPY: ICD-10-CM

## 2018-06-06 DIAGNOSIS — F20.9 SCHIZOPHRENIA, UNSPECIFIED: ICD-10-CM

## 2018-07-03 ENCOUNTER — OUTPATIENT (OUTPATIENT)
Dept: OUTPATIENT SERVICES | Facility: HOSPITAL | Age: 48
LOS: 1 days | Discharge: HOME | End: 2018-07-03

## 2018-07-03 DIAGNOSIS — F20.9 SCHIZOPHRENIA, UNSPECIFIED: ICD-10-CM

## 2018-07-03 DIAGNOSIS — Z79.899 OTHER LONG TERM (CURRENT) DRUG THERAPY: ICD-10-CM

## 2018-07-11 ENCOUNTER — OUTPATIENT (OUTPATIENT)
Dept: OUTPATIENT SERVICES | Facility: HOSPITAL | Age: 48
LOS: 1 days | Discharge: HOME | End: 2018-07-11

## 2018-07-11 ENCOUNTER — APPOINTMENT (OUTPATIENT)
Dept: ENDOCRINOLOGY | Facility: CLINIC | Age: 48
End: 2018-07-11

## 2018-07-11 ENCOUNTER — OTHER (OUTPATIENT)
Age: 48
End: 2018-07-11

## 2018-07-11 VITALS
HEART RATE: 112 BPM | HEIGHT: 63 IN | WEIGHT: 221 LBS | DIASTOLIC BLOOD PRESSURE: 74 MMHG | SYSTOLIC BLOOD PRESSURE: 107 MMHG | BODY MASS INDEX: 39.16 KG/M2

## 2018-07-11 DIAGNOSIS — F17.200 NICOTINE DEPENDENCE, UNSPECIFIED, UNCOMPLICATED: ICD-10-CM

## 2018-07-18 ENCOUNTER — APPOINTMENT (OUTPATIENT)
Dept: OBGYN | Facility: CLINIC | Age: 48
End: 2018-07-18

## 2018-07-18 ENCOUNTER — OUTPATIENT (OUTPATIENT)
Dept: OUTPATIENT SERVICES | Facility: HOSPITAL | Age: 48
LOS: 1 days | Discharge: HOME | End: 2018-07-18

## 2018-07-18 VITALS
DIASTOLIC BLOOD PRESSURE: 78 MMHG | WEIGHT: 223.19 LBS | SYSTOLIC BLOOD PRESSURE: 120 MMHG | HEIGHT: 63 IN | BODY MASS INDEX: 39.55 KG/M2

## 2018-07-18 DIAGNOSIS — E11.65 TYPE 2 DIABETES MELLITUS WITH HYPERGLYCEMIA: ICD-10-CM

## 2018-07-18 DIAGNOSIS — Z30.42 ENCOUNTER FOR SURVEILLANCE OF INJECTABLE CONTRACEPTIVE: ICD-10-CM

## 2018-07-18 DIAGNOSIS — L29.2 PRURITUS VULVAE: ICD-10-CM

## 2018-07-18 DIAGNOSIS — Z87.19 PERSONAL HISTORY OF OTHER DISEASES OF THE DIGESTIVE SYSTEM: ICD-10-CM

## 2018-07-18 DIAGNOSIS — Z01.419 ENCOUNTER FOR GYNECOLOGICAL EXAMINATION (GENERAL) (ROUTINE) W/OUT ABNORMAL FINDINGS: ICD-10-CM

## 2018-07-18 RX ORDER — MEDROXYPROGESTERONE ACETATE 150 MG/ML
150 INJECTION, SUSPENSION INTRAMUSCULAR
Qty: 0 | Refills: 0 | Status: COMPLETED | OUTPATIENT
Start: 2018-07-18

## 2018-07-18 RX ADMIN — MEDROXYPROGESTERONE ACETATE 0 MG/ML: 150 INJECTION, SUSPENSION INTRAMUSCULAR at 00:00

## 2018-07-19 DIAGNOSIS — J40 BRONCHITIS, NOT SPECIFIED AS ACUTE OR CHRONIC: ICD-10-CM

## 2018-07-19 DIAGNOSIS — Z30.9 ENCOUNTER FOR CONTRACEPTIVE MANAGEMENT, UNSPECIFIED: ICD-10-CM

## 2018-08-01 ENCOUNTER — OUTPATIENT (OUTPATIENT)
Dept: OUTPATIENT SERVICES | Facility: HOSPITAL | Age: 48
LOS: 1 days | Discharge: HOME | End: 2018-08-01

## 2018-08-01 DIAGNOSIS — F20.9 SCHIZOPHRENIA, UNSPECIFIED: ICD-10-CM

## 2018-08-01 DIAGNOSIS — Z79.899 OTHER LONG TERM (CURRENT) DRUG THERAPY: ICD-10-CM

## 2018-08-20 ENCOUNTER — RX RENEWAL (OUTPATIENT)
Age: 48
End: 2018-08-20

## 2018-08-21 ENCOUNTER — RX RENEWAL (OUTPATIENT)
Age: 48
End: 2018-08-21

## 2018-08-29 ENCOUNTER — OUTPATIENT (OUTPATIENT)
Dept: OUTPATIENT SERVICES | Facility: HOSPITAL | Age: 48
LOS: 1 days | Discharge: HOME | End: 2018-08-29

## 2018-09-26 ENCOUNTER — OUTPATIENT (OUTPATIENT)
Dept: OUTPATIENT SERVICES | Facility: HOSPITAL | Age: 48
LOS: 1 days | Discharge: HOME | End: 2018-09-26

## 2018-09-26 DIAGNOSIS — F20.9 SCHIZOPHRENIA, UNSPECIFIED: ICD-10-CM

## 2018-09-26 DIAGNOSIS — Z79.899 OTHER LONG TERM (CURRENT) DRUG THERAPY: ICD-10-CM

## 2018-10-03 ENCOUNTER — APPOINTMENT (OUTPATIENT)
Dept: OBGYN | Facility: CLINIC | Age: 48
End: 2018-10-03

## 2018-10-03 ENCOUNTER — OUTPATIENT (OUTPATIENT)
Dept: OUTPATIENT SERVICES | Facility: HOSPITAL | Age: 48
LOS: 1 days | Discharge: HOME | End: 2018-10-03

## 2018-10-03 VITALS
SYSTOLIC BLOOD PRESSURE: 110 MMHG | DIASTOLIC BLOOD PRESSURE: 62 MMHG | BODY MASS INDEX: 39.87 KG/M2 | WEIGHT: 225 LBS | HEIGHT: 63 IN

## 2018-10-04 DIAGNOSIS — Z30.42 ENCOUNTER FOR SURVEILLANCE OF INJECTABLE CONTRACEPTIVE: ICD-10-CM

## 2018-10-04 DIAGNOSIS — E11.8 TYPE 2 DIABETES MELLITUS WITH UNSPECIFIED COMPLICATIONS: ICD-10-CM

## 2018-10-04 DIAGNOSIS — E66.9 OBESITY, UNSPECIFIED: ICD-10-CM

## 2018-10-17 ENCOUNTER — MED ADMIN CHARGE (OUTPATIENT)
Age: 48
End: 2018-10-17

## 2018-10-17 ENCOUNTER — APPOINTMENT (OUTPATIENT)
Dept: INTERNAL MEDICINE | Facility: CLINIC | Age: 48
End: 2018-10-17

## 2018-10-17 ENCOUNTER — OUTPATIENT (OUTPATIENT)
Dept: OUTPATIENT SERVICES | Facility: HOSPITAL | Age: 48
LOS: 1 days | Discharge: HOME | End: 2018-10-17

## 2018-10-17 VITALS
DIASTOLIC BLOOD PRESSURE: 86 MMHG | HEIGHT: 63 IN | WEIGHT: 229 LBS | HEART RATE: 109 BPM | BODY MASS INDEX: 40.57 KG/M2 | SYSTOLIC BLOOD PRESSURE: 119 MMHG

## 2018-10-17 NOTE — ASSESSMENT
[FreeTextEntry1] : 47 year old female with PMH of depression, schizoaffective disorder, DM2, HTN, and dyslipidemia presenting for regularly scheduled follow-up.\par \par # Type 2 DM\par  HbA1C 8.8 after switching antidiabetic meds to\par  Toujeo, Tradjenta, Pioglitazone and Metformin. Follow up with endocrinology, referral to ophthalomology and podiatry\par \par # Hypertension \par on Losartan, refill given\par \par #Schizoaffective disorder\par  on Clozapine and Fluphenazine\par \par \par #Dyslipidemia.\par  LDL 77 on Jan 24 continue with Lipitor 40mg, refill given\par \par #HCM \par Flu shot today\par baseline blood work\par up to date with mammogram and Pap smear

## 2018-10-17 NOTE — HEALTH RISK ASSESSMENT
[] : Yes [No falls in past year] : Patient reported no falls in the past year [0] : 2) Feeling down, depressed, or hopeless: Not at all (0) [de-identified] : Half a pack a day

## 2018-10-17 NOTE — PHYSICAL EXAM
[No Acute Distress] : no acute distress [Well Nourished] : well nourished [Well Developed] : well developed [Well-Appearing] : well-appearing [Normal Sclera/Conjunctiva] : normal sclera/conjunctiva [PERRL] : pupils equal round and reactive to light [EOMI] : extraocular movements intact [Normal Outer Ear/Nose] : the outer ears and nose were normal in appearance [Normal Oropharynx] : the oropharynx was normal [No JVD] : no jugular venous distention [Supple] : supple [No Lymphadenopathy] : no lymphadenopathy [No Respiratory Distress] : no respiratory distress  [Normal Rate] : normal rate  [Regular Rhythm] : with a regular rhythm [Normal S1, S2] : normal S1 and S2 [No Murmur] : no murmur heard [Soft] : abdomen soft [Non Tender] : non-tender [Non-distended] : non-distended [No HSM] : no HSM [Normal Bowel Sounds] : normal bowel sounds [No CVA Tenderness] : no CVA  tenderness [No Spinal Tenderness] : no spinal tenderness [No Rash] : no rash [Normal Gait] : normal gait [Coordination Grossly Intact] : coordination grossly intact [No Focal Deficits] : no focal deficits

## 2018-10-17 NOTE — HISTORY OF PRESENT ILLNESS
[FreeTextEntry1] : Follow up of DM and HTN [de-identified] : 49 y/o female with PMHx of depression, schizoaffective disorder, DM2, HTN, and dyslipidemia presenting for regularly scheduled follow-up. last time she was seen it was in April 2018, Since then patient had no events, patient is healthy, She denies fever, chills, cough, n/v/d, bowel and urinary symptoms. She has seen her OB/GYN and pap smear was normal. She asking for medical form to be filled .

## 2018-10-18 DIAGNOSIS — E11.65 TYPE 2 DIABETES MELLITUS WITH HYPERGLYCEMIA: ICD-10-CM

## 2018-10-18 DIAGNOSIS — Z23 ENCOUNTER FOR IMMUNIZATION: ICD-10-CM

## 2018-10-18 DIAGNOSIS — I10 ESSENTIAL (PRIMARY) HYPERTENSION: ICD-10-CM

## 2018-10-18 DIAGNOSIS — E78.5 HYPERLIPIDEMIA, UNSPECIFIED: ICD-10-CM

## 2018-10-24 ENCOUNTER — OUTPATIENT (OUTPATIENT)
Dept: OUTPATIENT SERVICES | Facility: HOSPITAL | Age: 48
LOS: 1 days | Discharge: HOME | End: 2018-10-24

## 2018-10-24 ENCOUNTER — LABORATORY RESULT (OUTPATIENT)
Age: 48
End: 2018-10-24

## 2018-10-24 DIAGNOSIS — F20.9 SCHIZOPHRENIA, UNSPECIFIED: ICD-10-CM

## 2018-10-24 DIAGNOSIS — Z79.899 OTHER LONG TERM (CURRENT) DRUG THERAPY: ICD-10-CM

## 2018-10-25 ENCOUNTER — APPOINTMENT (OUTPATIENT)
Dept: NUTRITION | Facility: CLINIC | Age: 48
End: 2018-10-25

## 2018-10-25 VITALS — HEIGHT: 63 IN | BODY MASS INDEX: 40.57 KG/M2 | WEIGHT: 229 LBS

## 2018-10-25 LAB
25(OH)D3 SERPL-MCNC: 27 NG/ML
ALBUMIN SERPL ELPH-MCNC: 4.2 G/DL
ALP BLD-CCNC: 88 U/L
ALT SERPL-CCNC: 11 U/L
ANION GAP SERPL CALC-SCNC: 16 MMOL/L
AST SERPL-CCNC: 12 U/L
BILIRUB SERPL-MCNC: 0.3 MG/DL
BUN SERPL-MCNC: 14 MG/DL
CALCIUM SERPL-MCNC: 9.6 MG/DL
CHLORIDE SERPL-SCNC: 106 MMOL/L
CHOLEST SERPL-MCNC: 134 MG/DL
CHOLEST/HDLC SERPL: 3.1 RATIO
CO2 SERPL-SCNC: 21 MMOL/L
CREAT SERPL-MCNC: 1 MG/DL
CREAT SPEC-SCNC: 118 MG/DL
GLUCOSE SERPL-MCNC: 119 MG/DL
HDLC SERPL-MCNC: 43 MG/DL
LDLC SERPL CALC-MCNC: 70 MG/DL
MICROALBUMIN 24H UR DL<=1MG/L-MCNC: <1.2 MG/DL
MICROALBUMIN/CREAT 24H UR-RTO: NORMAL
POTASSIUM SERPL-SCNC: 4.1 MMOL/L
PROT SERPL-MCNC: 7.4 G/DL
SODIUM SERPL-SCNC: 143 MMOL/L
TRIGL SERPL-MCNC: 156 MG/DL
TSH SERPL-ACNC: 1.91 UIU/ML

## 2018-10-27 LAB
M TB IFN-G BLD-IMP: NEGATIVE
QUANTIFERON TB PLUS MITOGEN MINUS NIL: 4.86 IU/ML
QUANTIFERON TB PLUS NIL: 0.04 IU/ML
QUANTIFERON TB PLUS TB1 MINUS NIL: -0.01 IU/ML
QUANTIFERON TB PLUS TB2 MINUS NIL: -0.01 IU/ML

## 2018-11-06 ENCOUNTER — APPOINTMENT (OUTPATIENT)
Dept: DERMATOLOGY | Facility: CLINIC | Age: 48
End: 2018-11-06

## 2018-11-06 ENCOUNTER — OUTPATIENT (OUTPATIENT)
Dept: OUTPATIENT SERVICES | Facility: HOSPITAL | Age: 48
LOS: 1 days | Discharge: HOME | End: 2018-11-06

## 2018-11-06 RX ORDER — LIQUID WART REMOVER 17 MG/100ML
17 LIQUID TOPICAL TWICE DAILY
Qty: 1 | Refills: 3 | Status: ACTIVE | COMMUNITY
Start: 2018-11-06 | End: 1900-01-01

## 2018-11-20 ENCOUNTER — OUTPATIENT (OUTPATIENT)
Dept: OUTPATIENT SERVICES | Facility: HOSPITAL | Age: 48
LOS: 1 days | Discharge: HOME | End: 2018-11-20

## 2018-11-20 DIAGNOSIS — F20.9 SCHIZOPHRENIA, UNSPECIFIED: ICD-10-CM

## 2018-11-20 DIAGNOSIS — Z79.899 OTHER LONG TERM (CURRENT) DRUG THERAPY: ICD-10-CM

## 2018-12-19 ENCOUNTER — OUTPATIENT (OUTPATIENT)
Dept: OUTPATIENT SERVICES | Facility: HOSPITAL | Age: 48
LOS: 1 days | Discharge: HOME | End: 2018-12-19

## 2018-12-19 DIAGNOSIS — F20.9 SCHIZOPHRENIA, UNSPECIFIED: ICD-10-CM

## 2018-12-19 DIAGNOSIS — Z79.899 OTHER LONG TERM (CURRENT) DRUG THERAPY: ICD-10-CM

## 2018-12-26 ENCOUNTER — OUTPATIENT (OUTPATIENT)
Dept: OUTPATIENT SERVICES | Facility: HOSPITAL | Age: 48
LOS: 1 days | Discharge: HOME | End: 2018-12-26

## 2018-12-26 ENCOUNTER — APPOINTMENT (OUTPATIENT)
Dept: OBGYN | Facility: CLINIC | Age: 48
End: 2018-12-26

## 2018-12-26 VITALS — DIASTOLIC BLOOD PRESSURE: 80 MMHG | WEIGHT: 238 LBS | BODY MASS INDEX: 42.16 KG/M2 | SYSTOLIC BLOOD PRESSURE: 118 MMHG

## 2018-12-26 RX ORDER — MEDROXYPROGESTERONE ACETATE 150 MG/ML
150 INJECTION, SUSPENSION INTRAMUSCULAR
Refills: 0 | Status: COMPLETED | OUTPATIENT
Start: 2018-12-26

## 2018-12-26 RX ORDER — CHOLECALCIFEROL (VITAMIN D3) 25 MCG
25 MCG TABLET,CHEWABLE ORAL DAILY
Qty: 30 | Refills: 11 | Status: ACTIVE | COMMUNITY
Start: 2018-12-26 | End: 1900-01-01

## 2018-12-26 RX ADMIN — MEDROXYPROGESTERONE ACETATE 0 MG/ML: 150 INJECTION, SUSPENSION INTRAMUSCULAR at 00:00

## 2018-12-27 DIAGNOSIS — Z30.42 ENCOUNTER FOR SURVEILLANCE OF INJECTABLE CONTRACEPTIVE: ICD-10-CM

## 2019-01-09 ENCOUNTER — OUTPATIENT (OUTPATIENT)
Dept: OUTPATIENT SERVICES | Facility: HOSPITAL | Age: 49
LOS: 1 days | Discharge: HOME | End: 2019-01-09

## 2019-01-09 ENCOUNTER — APPOINTMENT (OUTPATIENT)
Dept: ENDOCRINOLOGY | Facility: CLINIC | Age: 49
End: 2019-01-09

## 2019-01-09 VITALS
DIASTOLIC BLOOD PRESSURE: 72 MMHG | SYSTOLIC BLOOD PRESSURE: 116 MMHG | WEIGHT: 239 LBS | BODY MASS INDEX: 42.35 KG/M2 | HEIGHT: 63 IN | HEART RATE: 100 BPM

## 2019-01-09 RX ORDER — DIPHENHYDRAMINE HCL 25 MG
5-10-200-325 CAPSULE ORAL
Qty: 1 | Refills: 0 | Status: DISCONTINUED | COMMUNITY
Start: 2018-04-11 | End: 2019-01-09

## 2019-01-09 NOTE — ASSESSMENT
[Carbohydrate Consistent Diet] : carbohydrate consistent diet [Hypoglycemia Management] : hypoglycemia management [Long Term Vascular Complications] : long term vascular complications of diabetes [Importance of Diet and Exercise] : importance of diet and exercise to improve glycemic control, achieve weight loss and improve cardiovascular health [Smoking Cessation] : smoking cessation [FreeTextEntry1] : 48/F presents to ED for follow up for DM2 care - which is attributed to Anti-psych medications.\par \par Diabetes Type II:  Last HbA1c: 7.0 (10/2018) which is improved from pior A1c (11%)\par - FSG range 78-98, no hypoglycemic episodes\par -Repeat A1c & Microalbuminuria \par -Continue with current Anti-hyperglycemic regimen: Tradjenta 5mg qD & Jardiance 10mg qD, Toujeo 20u qPM, Pioglitazone 15mg qD, meformin 750mg biD. \par -Pt educated on examining feet daily for lesions & on benefit of exercise and healthy diet\par -Podiatry & Ophthalmology eval\par -Follows with nutritionist\par -C/w statin & losartan                   try asthma inhalers discuss depot medroxyprogesterone with gyn team.\par \par Blurry visiiobn - ophthal eval\par Smoking cessation advised\par \par f/u 6mo

## 2019-01-09 NOTE — PHYSICAL EXAM
[Alert] : alert [No Acute Distress] : no acute distress [Well Nourished] : well nourished [Well Developed] : well developed [Normal Sclera/Conjunctiva] : normal sclera/conjunctiva [PERRL] : pupils equal, round and reactive to light [EOMI] : extra ocular movement intact [No Respiratory Distress] : no respiratory distress [No Accessory Muscle Use] : no accessory muscle use [Normal S1, S2] : normal S1 and S2 [Regular Rhythm] : with a regular rhythm [Murmurs] : no murmurs [No Edema] : there was no peripheral edema [Normal Bowel Sounds] : normal bowel sounds [Not Tender] : non-tender [Soft] : abdomen soft [No Involuntary Movements] : no involuntary movements were seen [Normal Rate and Effort] : normal respiratory rhythm and effort [de-identified] : wheezing in both [id number]

## 2019-01-15 ENCOUNTER — APPOINTMENT (OUTPATIENT)
Dept: PODIATRY | Facility: CLINIC | Age: 49
End: 2019-01-15

## 2019-01-16 ENCOUNTER — APPOINTMENT (OUTPATIENT)
Dept: NUTRITION | Facility: CLINIC | Age: 49
End: 2019-01-16

## 2019-01-16 ENCOUNTER — OUTPATIENT (OUTPATIENT)
Dept: OUTPATIENT SERVICES | Facility: HOSPITAL | Age: 49
LOS: 1 days | Discharge: HOME | End: 2019-01-16

## 2019-01-16 DIAGNOSIS — F20.9 SCHIZOPHRENIA, UNSPECIFIED: ICD-10-CM

## 2019-01-16 DIAGNOSIS — Z79.899 OTHER LONG TERM (CURRENT) DRUG THERAPY: ICD-10-CM

## 2019-01-18 DIAGNOSIS — J45.909 UNSPECIFIED ASTHMA, UNCOMPLICATED: ICD-10-CM

## 2019-01-18 DIAGNOSIS — E11.65 TYPE 2 DIABETES MELLITUS WITH HYPERGLYCEMIA: ICD-10-CM

## 2019-01-18 DIAGNOSIS — E66.01 MORBID (SEVERE) OBESITY DUE TO EXCESS CALORIES: ICD-10-CM

## 2019-01-30 ENCOUNTER — APPOINTMENT (OUTPATIENT)
Dept: NUTRITION | Facility: CLINIC | Age: 49
End: 2019-01-30

## 2019-01-30 ENCOUNTER — OUTPATIENT (OUTPATIENT)
Dept: OUTPATIENT SERVICES | Facility: HOSPITAL | Age: 49
LOS: 1 days | Discharge: HOME | End: 2019-01-30

## 2019-01-30 ENCOUNTER — APPOINTMENT (OUTPATIENT)
Dept: INTERNAL MEDICINE | Facility: CLINIC | Age: 49
End: 2019-01-30

## 2019-01-30 VITALS — SYSTOLIC BLOOD PRESSURE: 113 MMHG | DIASTOLIC BLOOD PRESSURE: 72 MMHG | HEART RATE: 115 BPM

## 2019-01-30 VITALS — WEIGHT: 239 LBS | HEIGHT: 63 IN | BODY MASS INDEX: 42.35 KG/M2

## 2019-01-30 DIAGNOSIS — J45.909 UNSPECIFIED ASTHMA, UNCOMPLICATED: ICD-10-CM

## 2019-01-30 NOTE — HISTORY OF PRESENT ILLNESS
[FreeTextEntry1] : follow up  [de-identified] : 48 year female patient with PMH listed below presented  for routine follow up .Patient is complaining of blurry vision f one year duration, she also complain of cough , with whitish /yellowish sputum starting around 4 weeks ago , no associated fever or chills, no associated SOB.

## 2019-01-30 NOTE — ASSESSMENT
[FreeTextEntry1] : 48 year female patient presented for routine follow up\par \par #diabetes: better controlled , last hba1c is 7 , continue with same medications for now\par \par #cough with sputum production: mostly bronchitis versus asthma, will check chest x-ray to r/o pneumonia. Z pack. \par \par #HTN: blood pressure controlled , continue with losartan \par \par #blurry vision: can be secondary to diabetes, will refer to opthalmology\par \par #refer to podiatry \par \par #hyperlipidemia: continue with atorvastatin\par \par #depression: continue with same medications, f/u with psych\par no depression symptoms now \par \par #HCM: patient need repeat mammogram\par needs to follow up with gyn, she has appointment for April 2019\par up to date in flu vaccine. \par

## 2019-01-31 DIAGNOSIS — E11.65 TYPE 2 DIABETES MELLITUS WITH HYPERGLYCEMIA: ICD-10-CM

## 2019-01-31 DIAGNOSIS — E78.5 HYPERLIPIDEMIA, UNSPECIFIED: ICD-10-CM

## 2019-01-31 DIAGNOSIS — I10 ESSENTIAL (PRIMARY) HYPERTENSION: ICD-10-CM

## 2019-02-13 ENCOUNTER — OUTPATIENT (OUTPATIENT)
Dept: OUTPATIENT SERVICES | Facility: HOSPITAL | Age: 49
LOS: 1 days | Discharge: HOME | End: 2019-02-13

## 2019-02-13 ENCOUNTER — APPOINTMENT (OUTPATIENT)
Dept: NUTRITION | Facility: CLINIC | Age: 49
End: 2019-02-13

## 2019-02-13 VITALS — HEIGHT: 63 IN | BODY MASS INDEX: 42.88 KG/M2 | WEIGHT: 242 LBS

## 2019-02-13 DIAGNOSIS — Z79.899 OTHER LONG TERM (CURRENT) DRUG THERAPY: ICD-10-CM

## 2019-02-13 DIAGNOSIS — F20.9 SCHIZOPHRENIA, UNSPECIFIED: ICD-10-CM

## 2019-02-14 ENCOUNTER — OTHER (OUTPATIENT)
Age: 49
End: 2019-02-14

## 2019-03-13 ENCOUNTER — OUTPATIENT (OUTPATIENT)
Dept: OUTPATIENT SERVICES | Facility: HOSPITAL | Age: 49
LOS: 1 days | Discharge: HOME | End: 2019-03-13

## 2019-03-13 DIAGNOSIS — Z79.899 OTHER LONG TERM (CURRENT) DRUG THERAPY: ICD-10-CM

## 2019-03-13 DIAGNOSIS — F20.9 SCHIZOPHRENIA, UNSPECIFIED: ICD-10-CM

## 2019-03-20 ENCOUNTER — FORM ENCOUNTER (OUTPATIENT)
Age: 49
End: 2019-03-20

## 2019-03-21 ENCOUNTER — OUTPATIENT (OUTPATIENT)
Dept: OUTPATIENT SERVICES | Facility: HOSPITAL | Age: 49
LOS: 1 days | Discharge: HOME | End: 2019-03-21

## 2019-03-21 DIAGNOSIS — R05 COUGH: ICD-10-CM

## 2019-03-21 DIAGNOSIS — J40 BRONCHITIS, NOT SPECIFIED AS ACUTE OR CHRONIC: ICD-10-CM

## 2019-03-21 DIAGNOSIS — Z12.31 ENCOUNTER FOR SCREENING MAMMOGRAM FOR MALIGNANT NEOPLASM OF BREAST: ICD-10-CM

## 2019-03-27 ENCOUNTER — APPOINTMENT (OUTPATIENT)
Dept: OBGYN | Facility: CLINIC | Age: 49
End: 2019-03-27

## 2019-03-27 ENCOUNTER — OUTPATIENT (OUTPATIENT)
Dept: OUTPATIENT SERVICES | Facility: HOSPITAL | Age: 49
LOS: 1 days | Discharge: HOME | End: 2019-03-27

## 2019-03-27 VITALS — BODY MASS INDEX: 44.29 KG/M2 | DIASTOLIC BLOOD PRESSURE: 70 MMHG | SYSTOLIC BLOOD PRESSURE: 110 MMHG | WEIGHT: 250 LBS

## 2019-03-27 RX ORDER — MEDROXYPROGESTERONE ACETATE 150 MG/ML
150 INJECTION, SUSPENSION INTRAMUSCULAR
Qty: 0 | Refills: 0 | Status: COMPLETED | OUTPATIENT
Start: 2019-03-27

## 2019-03-27 RX ADMIN — MEDROXYPROGESTERONE ACETATE 0 MG/ML: 150 INJECTION, SUSPENSION INTRAMUSCULAR at 00:00

## 2019-03-28 DIAGNOSIS — Z30.42 ENCOUNTER FOR SURVEILLANCE OF INJECTABLE CONTRACEPTIVE: ICD-10-CM

## 2019-04-10 ENCOUNTER — OUTPATIENT (OUTPATIENT)
Dept: OUTPATIENT SERVICES | Facility: HOSPITAL | Age: 49
LOS: 1 days | Discharge: HOME | End: 2019-04-10

## 2019-04-10 DIAGNOSIS — F20.9 SCHIZOPHRENIA, UNSPECIFIED: ICD-10-CM

## 2019-04-10 DIAGNOSIS — Z79.899 OTHER LONG TERM (CURRENT) DRUG THERAPY: ICD-10-CM

## 2019-04-18 RX ORDER — METFORMIN ER 750 MG 750 MG/1
750 TABLET ORAL
Qty: 180 | Refills: 3 | Status: DISCONTINUED | COMMUNITY
Start: 2017-08-25 | End: 2019-04-18

## 2019-04-24 ENCOUNTER — OUTPATIENT (OUTPATIENT)
Dept: OUTPATIENT SERVICES | Facility: HOSPITAL | Age: 49
LOS: 1 days | Discharge: HOME | End: 2019-04-24

## 2019-04-24 ENCOUNTER — APPOINTMENT (OUTPATIENT)
Dept: INTERNAL MEDICINE | Facility: CLINIC | Age: 49
End: 2019-04-24

## 2019-04-24 VITALS
WEIGHT: 251 LBS | DIASTOLIC BLOOD PRESSURE: 77 MMHG | HEIGHT: 63 IN | HEART RATE: 118 BPM | SYSTOLIC BLOOD PRESSURE: 135 MMHG | BODY MASS INDEX: 44.47 KG/M2

## 2019-04-24 DIAGNOSIS — B07.8 OTHER VIRAL WARTS: ICD-10-CM

## 2019-04-24 DIAGNOSIS — Z87.09 PERSONAL HISTORY OF OTHER DISEASES OF THE RESPIRATORY SYSTEM: ICD-10-CM

## 2019-04-24 DIAGNOSIS — H53.8 OTHER VISUAL DISTURBANCES: ICD-10-CM

## 2019-04-24 DIAGNOSIS — I10 ESSENTIAL (PRIMARY) HYPERTENSION: ICD-10-CM

## 2019-04-24 DIAGNOSIS — R05 COUGH: ICD-10-CM

## 2019-04-24 DIAGNOSIS — F32.9 MAJOR DEPRESSIVE DISORDER, SINGLE EPISODE, UNSPECIFIED: ICD-10-CM

## 2019-04-24 DIAGNOSIS — F25.9 SCHIZOAFFECTIVE DISORDER, UNSPECIFIED: ICD-10-CM

## 2019-04-24 DIAGNOSIS — Z00.00 ENCOUNTER FOR GENERAL ADULT MEDICAL EXAMINATION W/OUT ABNORMAL FINDINGS: ICD-10-CM

## 2019-04-24 RX ORDER — ALBUTEROL SULFATE 90 UG/1
108 (90 BASE) AEROSOL, METERED RESPIRATORY (INHALATION) EVERY 4 HOURS
Qty: 1 | Refills: 1 | Status: ACTIVE | COMMUNITY
Start: 2019-04-24 | End: 1900-01-01

## 2019-04-24 RX ORDER — AZITHROMYCIN 250 MG/1
250 TABLET, FILM COATED ORAL
Qty: 1 | Refills: 0 | Status: DISCONTINUED | COMMUNITY
Start: 2019-01-30 | End: 2019-04-24

## 2019-04-24 RX ORDER — CHROMIUM 200 MCG
10 MCG TABLET ORAL DAILY
Qty: 30 | Refills: 2 | Status: DISCONTINUED | COMMUNITY
Start: 2018-11-18 | End: 2019-04-24

## 2019-04-24 NOTE — PHYSICAL EXAM
[Well Nourished] : well nourished [No Acute Distress] : no acute distress [Well Developed] : well developed [Well-Appearing] : well-appearing [Normal Sclera/Conjunctiva] : normal sclera/conjunctiva [Clear to Auscultation] : lungs were clear to auscultation bilaterally [No Respiratory Distress] : no respiratory distress  [Regular Rhythm] : with a regular rhythm [Normal Rate] : normal rate  [No Accessory Muscle Use] : no accessory muscle use [Soft] : abdomen soft [Normal S1, S2] : normal S1 and S2 [Normal Gait] : normal gait [Non Tender] : non-tender [Normal Bowel Sounds] : normal bowel sounds [Speech Grossly Normal] : speech grossly normal [Normal Mood] : the mood was normal [Memory Grossly Normal] : memory grossly normal

## 2019-04-24 NOTE — HISTORY OF PRESENT ILLNESS
[FreeTextEntry1] : follow up appt [de-identified] : 4//F hx of DM, depression, DLD, smoker, presents to clinic for routine evaluation.\par \par Has a chronic cough, no fever, no blood in sputum, white sputum at times, no gerd symptoms, no bird or exposure to other animals other than sister's dog - no hx of animal allergy. No hx of homelessness, did live in [facility] for 3 yrs (now has own appt), no wt loss. Was told she has asthma.

## 2019-04-24 NOTE — ASSESSMENT
[FreeTextEntry1] : 48/F patient presented for routine follow up appt.\par \par # Chronic cough x 7 months - all times of the day, a/w white sputum, no blood in sputum, +smoker\par - Neg CXR 3/2019\par - No hx of PNA / URTI; no improvement with z-pack 3/2019\par - Smoking cessation counseled - 8 cig a day x 24 yrs\par - Lives in [facility] x3 yrs; now lives in own appt\par - Hx exposure to dog - no hx allergies to dogs\par - Has seasonal allergies \par - No GERD symptoms\par - Quantiferon Gold neg 2018\par - Was told she has asthma - trial of PRN Albuterol ; will check PFTs\par - Tessalon pearls PRN\par - Counceled on smoking cessation - pt is going to smoking cessation program this month at  psych\par \par #diabetes: last a1c 7 10/2018\par - FSG < 150 at home\par - Follows with Endocrine\par - Current regimen: Tradjenta & Jardiance & Toujeo & Pioglitazone 15mg qD, meformin 750mg biD. \par - Opthal appt 5/15/19\par - Pods referral\par - Urine Microalb/Cr\par \par #HTN: controlled with losartan \par \par #blurry vision: can be secondary to diabetes, Ophthal appt 5/15/19\par \par #hyperlipidemia: continue with atorvastatin\par \par # Depression: diagnosed 25 yrs ago hx of IPP; no suicidal / homicidial ideation, no hallucinations or hearing voices \par - Last saw psychiatrist 2mo ago - waiting on assignment to new psychiatrist ([facility])\par - Clozapine 400mg qHS\par - States will see psych within 2 weeks\par \par # HCM Females:\par -Routine labs: CBC & CMP & TSH & Vit D & Lipid profile & A1c\par -Mammogram: UTD 3/2019\par -Pap smear: UTD Last PAP smear, neg HPV 4/18 - per gyn note\par -Hep C neg\par \par RTC 3 mo

## 2019-04-25 DIAGNOSIS — E11.65 TYPE 2 DIABETES MELLITUS WITH HYPERGLYCEMIA: ICD-10-CM

## 2019-04-25 DIAGNOSIS — E78.5 HYPERLIPIDEMIA, UNSPECIFIED: ICD-10-CM

## 2019-04-25 DIAGNOSIS — I10 ESSENTIAL (PRIMARY) HYPERTENSION: ICD-10-CM

## 2019-05-01 ENCOUNTER — APPOINTMENT (OUTPATIENT)
Dept: OBGYN | Facility: CLINIC | Age: 49
End: 2019-05-01

## 2019-05-08 ENCOUNTER — OUTPATIENT (OUTPATIENT)
Dept: OUTPATIENT SERVICES | Facility: HOSPITAL | Age: 49
LOS: 1 days | Discharge: HOME | End: 2019-05-08

## 2019-05-08 DIAGNOSIS — F20.89 OTHER SCHIZOPHRENIA: ICD-10-CM

## 2019-05-08 DIAGNOSIS — Z79.899 OTHER LONG TERM (CURRENT) DRUG THERAPY: ICD-10-CM

## 2019-05-27 ENCOUNTER — EMERGENCY (EMERGENCY)
Facility: HOSPITAL | Age: 49
LOS: 0 days | Discharge: HOME | End: 2019-05-28
Attending: EMERGENCY MEDICINE
Payer: MEDICARE

## 2019-05-27 PROCEDURE — 99284 EMERGENCY DEPT VISIT MOD MDM: CPT

## 2019-05-28 ENCOUNTER — INPATIENT (INPATIENT)
Facility: HOSPITAL | Age: 49
LOS: 42 days | Discharge: HOME | End: 2019-07-10
Attending: PSYCHIATRY & NEUROLOGY | Admitting: PSYCHIATRY & NEUROLOGY
Payer: MEDICARE

## 2019-05-28 VITALS
HEART RATE: 110 BPM | DIASTOLIC BLOOD PRESSURE: 71 MMHG | SYSTOLIC BLOOD PRESSURE: 142 MMHG | OXYGEN SATURATION: 98 % | RESPIRATION RATE: 18 BRPM | TEMPERATURE: 98 F

## 2019-05-28 VITALS
TEMPERATURE: 99 F | OXYGEN SATURATION: 96 % | HEART RATE: 121 BPM | SYSTOLIC BLOOD PRESSURE: 132 MMHG | DIASTOLIC BLOOD PRESSURE: 96 MMHG | RESPIRATION RATE: 18 BRPM

## 2019-05-28 VITALS — HEART RATE: 82 BPM

## 2019-05-28 DIAGNOSIS — F20.9 SCHIZOPHRENIA, UNSPECIFIED: ICD-10-CM

## 2019-05-28 LAB
ALBUMIN SERPL ELPH-MCNC: 3.9 G/DL — SIGNIFICANT CHANGE UP (ref 3.5–5.2)
ALBUMIN SERPL ELPH-MCNC: 4.3 G/DL — SIGNIFICANT CHANGE UP (ref 3.5–5.2)
ALP SERPL-CCNC: 109 U/L — SIGNIFICANT CHANGE UP (ref 30–115)
ALP SERPL-CCNC: 98 U/L — SIGNIFICANT CHANGE UP (ref 30–115)
ALT FLD-CCNC: 14 U/L — SIGNIFICANT CHANGE UP (ref 0–41)
ALT FLD-CCNC: 14 U/L — SIGNIFICANT CHANGE UP (ref 0–41)
ANION GAP SERPL CALC-SCNC: 16 MMOL/L — HIGH (ref 7–14)
ANION GAP SERPL CALC-SCNC: 21 MMOL/L — HIGH (ref 7–14)
APAP SERPL-MCNC: <5 UG/ML — LOW (ref 10–30)
APAP SERPL-MCNC: <5 UG/ML — LOW (ref 10–30)
APPEARANCE UR: ABNORMAL
AST SERPL-CCNC: 13 U/L — SIGNIFICANT CHANGE UP (ref 0–41)
AST SERPL-CCNC: 14 U/L — SIGNIFICANT CHANGE UP (ref 0–41)
BACTERIA # UR AUTO: ABNORMAL /HPF
BASOPHILS # BLD AUTO: 0.06 K/UL — SIGNIFICANT CHANGE UP (ref 0–0.2)
BASOPHILS NFR BLD AUTO: 0.4 % — SIGNIFICANT CHANGE UP (ref 0–1)
BILIRUB SERPL-MCNC: 0.3 MG/DL — SIGNIFICANT CHANGE UP (ref 0.2–1.2)
BILIRUB SERPL-MCNC: 0.3 MG/DL — SIGNIFICANT CHANGE UP (ref 0.2–1.2)
BILIRUB UR-MCNC: NEGATIVE — SIGNIFICANT CHANGE UP
BUN SERPL-MCNC: 11 MG/DL — SIGNIFICANT CHANGE UP (ref 10–20)
BUN SERPL-MCNC: 17 MG/DL — SIGNIFICANT CHANGE UP (ref 10–20)
CALCIUM SERPL-MCNC: 9.5 MG/DL — SIGNIFICANT CHANGE UP (ref 8.5–10.1)
CALCIUM SERPL-MCNC: 9.8 MG/DL — SIGNIFICANT CHANGE UP (ref 8.5–10.1)
CHLORIDE SERPL-SCNC: 103 MMOL/L — SIGNIFICANT CHANGE UP (ref 98–110)
CHLORIDE SERPL-SCNC: 109 MMOL/L — SIGNIFICANT CHANGE UP (ref 98–110)
CO2 SERPL-SCNC: 14 MMOL/L — LOW (ref 17–32)
CO2 SERPL-SCNC: 20 MMOL/L — SIGNIFICANT CHANGE UP (ref 17–32)
COLOR SPEC: YELLOW — SIGNIFICANT CHANGE UP
CREAT SERPL-MCNC: 1.1 MG/DL — SIGNIFICANT CHANGE UP (ref 0.7–1.5)
CREAT SERPL-MCNC: 1.2 MG/DL — SIGNIFICANT CHANGE UP (ref 0.7–1.5)
DIFF PNL FLD: ABNORMAL
EOSINOPHIL # BLD AUTO: 0.02 K/UL — SIGNIFICANT CHANGE UP (ref 0–0.7)
EOSINOPHIL NFR BLD AUTO: 0.1 % — SIGNIFICANT CHANGE UP (ref 0–8)
EPI CELLS # UR: ABNORMAL /HPF
ETHANOL SERPL-MCNC: <10 MG/DL — SIGNIFICANT CHANGE UP
GLUCOSE SERPL-MCNC: 153 MG/DL — HIGH (ref 70–99)
GLUCOSE SERPL-MCNC: 248 MG/DL — HIGH (ref 70–99)
GLUCOSE UR QL: >=1000
HCG SERPL QL: NEGATIVE — SIGNIFICANT CHANGE UP
HCT VFR BLD CALC: 43.5 % — SIGNIFICANT CHANGE UP (ref 37–47)
HCT VFR BLD CALC: 47.1 % — HIGH (ref 37–47)
HGB BLD-MCNC: 13.9 G/DL — SIGNIFICANT CHANGE UP (ref 12–16)
HGB BLD-MCNC: 15 G/DL — SIGNIFICANT CHANGE UP (ref 12–16)
IMM GRANULOCYTES NFR BLD AUTO: 0.5 % — HIGH (ref 0.1–0.3)
KETONES UR-MCNC: >=80
LACTATE SERPL-SCNC: 1.1 MMOL/L — SIGNIFICANT CHANGE UP (ref 0.5–2.2)
LEUKOCYTE ESTERASE UR-ACNC: NEGATIVE — SIGNIFICANT CHANGE UP
LYMPHOCYTES # BLD AUTO: 1.35 K/UL — SIGNIFICANT CHANGE UP (ref 1.2–3.4)
LYMPHOCYTES # BLD AUTO: 8.6 % — LOW (ref 20.5–51.1)
MCHC RBC-ENTMCNC: 28.1 PG — SIGNIFICANT CHANGE UP (ref 27–31)
MCHC RBC-ENTMCNC: 28.7 PG — SIGNIFICANT CHANGE UP (ref 27–31)
MCHC RBC-ENTMCNC: 31.8 G/DL — LOW (ref 32–37)
MCHC RBC-ENTMCNC: 32 G/DL — SIGNIFICANT CHANGE UP (ref 32–37)
MCV RBC AUTO: 88.4 FL — SIGNIFICANT CHANGE UP (ref 81–99)
MCV RBC AUTO: 89.9 FL — SIGNIFICANT CHANGE UP (ref 81–99)
MONOCYTES # BLD AUTO: 0.95 K/UL — HIGH (ref 0.1–0.6)
MONOCYTES NFR BLD AUTO: 6 % — SIGNIFICANT CHANGE UP (ref 1.7–9.3)
NEUTROPHILS # BLD AUTO: 13.31 K/UL — HIGH (ref 1.4–6.5)
NEUTROPHILS NFR BLD AUTO: 84.4 % — HIGH (ref 42.2–75.2)
NITRITE UR-MCNC: NEGATIVE — SIGNIFICANT CHANGE UP
NRBC # BLD: 0 /100 WBCS — SIGNIFICANT CHANGE UP (ref 0–0)
NRBC # BLD: 0 /100 WBCS — SIGNIFICANT CHANGE UP (ref 0–0)
PH UR: 6 — SIGNIFICANT CHANGE UP (ref 5–8)
PLATELET # BLD AUTO: 383 K/UL — SIGNIFICANT CHANGE UP (ref 130–400)
PLATELET # BLD AUTO: 397 K/UL — SIGNIFICANT CHANGE UP (ref 130–400)
POTASSIUM SERPL-MCNC: 4.1 MMOL/L — SIGNIFICANT CHANGE UP (ref 3.5–5)
POTASSIUM SERPL-MCNC: 4.2 MMOL/L — SIGNIFICANT CHANGE UP (ref 3.5–5)
POTASSIUM SERPL-SCNC: 4.1 MMOL/L — SIGNIFICANT CHANGE UP (ref 3.5–5)
POTASSIUM SERPL-SCNC: 4.2 MMOL/L — SIGNIFICANT CHANGE UP (ref 3.5–5)
PROT SERPL-MCNC: 6.8 G/DL — SIGNIFICANT CHANGE UP (ref 6–8)
PROT SERPL-MCNC: 7.4 G/DL — SIGNIFICANT CHANGE UP (ref 6–8)
PROT UR-MCNC: ABNORMAL
RBC # BLD: 4.84 M/UL — SIGNIFICANT CHANGE UP (ref 4.2–5.4)
RBC # BLD: 5.33 M/UL — SIGNIFICANT CHANGE UP (ref 4.2–5.4)
RBC # FLD: 14 % — SIGNIFICANT CHANGE UP (ref 11.5–14.5)
RBC # FLD: 14.3 % — SIGNIFICANT CHANGE UP (ref 11.5–14.5)
SALICYLATES SERPL-MCNC: <0.3 MG/DL — LOW (ref 4–30)
SALICYLATES SERPL-MCNC: <0.3 MG/DL — LOW (ref 4–30)
SODIUM SERPL-SCNC: 139 MMOL/L — SIGNIFICANT CHANGE UP (ref 135–146)
SODIUM SERPL-SCNC: 144 MMOL/L — SIGNIFICANT CHANGE UP (ref 135–146)
SP GR SPEC: >=1.03 — SIGNIFICANT CHANGE UP (ref 1.01–1.03)
UROBILINOGEN FLD QL: 0.2 — SIGNIFICANT CHANGE UP (ref 0.2–0.2)
WBC # BLD: 10.59 K/UL — SIGNIFICANT CHANGE UP (ref 4.8–10.8)
WBC # BLD: 15.77 K/UL — HIGH (ref 4.8–10.8)
WBC # FLD AUTO: 10.59 K/UL — SIGNIFICANT CHANGE UP (ref 4.8–10.8)
WBC # FLD AUTO: 15.77 K/UL — HIGH (ref 4.8–10.8)
WBC UR QL: ABNORMAL /HPF

## 2019-05-28 PROCEDURE — 93010 ELECTROCARDIOGRAM REPORT: CPT | Mod: 77

## 2019-05-28 PROCEDURE — 93010 ELECTROCARDIOGRAM REPORT: CPT

## 2019-05-28 PROCEDURE — 99285 EMERGENCY DEPT VISIT HI MDM: CPT

## 2019-05-28 PROCEDURE — 71046 X-RAY EXAM CHEST 2 VIEWS: CPT | Mod: 26

## 2019-05-28 PROCEDURE — 71046 X-RAY EXAM CHEST 2 VIEWS: CPT | Mod: 26,77

## 2019-05-28 RX ORDER — HALOPERIDOL DECANOATE 100 MG/ML
5 INJECTION INTRAMUSCULAR EVERY 6 HOURS
Refills: 0 | Status: DISCONTINUED | OUTPATIENT
Start: 2019-05-29 | End: 2019-07-10

## 2019-05-28 RX ORDER — LOSARTAN POTASSIUM 100 MG/1
50 TABLET, FILM COATED ORAL DAILY
Refills: 0 | Status: DISCONTINUED | OUTPATIENT
Start: 2019-05-29 | End: 2019-06-11

## 2019-05-28 RX ORDER — DOCUSATE SODIUM 100 MG
100 CAPSULE ORAL DAILY
Refills: 0 | Status: DISCONTINUED | OUTPATIENT
Start: 2019-05-29 | End: 2019-07-10

## 2019-05-28 RX ORDER — MIRTAZAPINE 45 MG/1
7.5 TABLET, ORALLY DISINTEGRATING ORAL AT BEDTIME
Refills: 0 | Status: DISCONTINUED | OUTPATIENT
Start: 2019-05-29 | End: 2019-07-10

## 2019-05-28 RX ORDER — ACETAMINOPHEN 500 MG
650 TABLET ORAL EVERY 6 HOURS
Refills: 0 | Status: DISCONTINUED | OUTPATIENT
Start: 2019-05-29 | End: 2019-07-10

## 2019-05-28 RX ORDER — SODIUM CHLORIDE 9 MG/ML
1000 INJECTION INTRAMUSCULAR; INTRAVENOUS; SUBCUTANEOUS ONCE
Refills: 0 | Status: COMPLETED | OUTPATIENT
Start: 2019-05-28 | End: 2019-05-28

## 2019-05-28 RX ORDER — PIOGLITAZONE HYDROCHLORIDE 15 MG/1
30 TABLET ORAL DAILY
Refills: 0 | Status: DISCONTINUED | OUTPATIENT
Start: 2019-05-29 | End: 2019-07-10

## 2019-05-28 RX ORDER — ATORVASTATIN CALCIUM 80 MG/1
40 TABLET, FILM COATED ORAL AT BEDTIME
Refills: 0 | Status: DISCONTINUED | OUTPATIENT
Start: 2019-05-29 | End: 2019-07-10

## 2019-05-28 RX ADMIN — SODIUM CHLORIDE 1000 MILLILITER(S): 9 INJECTION INTRAMUSCULAR; INTRAVENOUS; SUBCUTANEOUS at 02:16

## 2019-05-28 RX ADMIN — SODIUM CHLORIDE 1000 MILLILITER(S): 9 INJECTION INTRAMUSCULAR; INTRAVENOUS; SUBCUTANEOUS at 00:51

## 2019-05-28 RX ADMIN — SODIUM CHLORIDE 2000 MILLILITER(S): 9 INJECTION INTRAMUSCULAR; INTRAVENOUS; SUBCUTANEOUS at 21:57

## 2019-05-28 RX ADMIN — SODIUM CHLORIDE 2000 MILLILITER(S): 9 INJECTION INTRAMUSCULAR; INTRAVENOUS; SUBCUTANEOUS at 21:09

## 2019-05-28 NOTE — ED PROVIDER NOTE - NSFOLLOWUPCLINICS_GEN_ALL_ED_FT
[facility]  OP Mental Health  [address] [place] [id number]  Phone: [phone]  Fax:   Follow Up Time:

## 2019-05-28 NOTE — ED BEHAVIORAL HEALTH ASSESSMENT NOTE - SUMMARY
50 yo  female with 48 yo  female with long hx of schizophrenia, DMII and HTN, transferred from [facility] to independent living in Jan 2019, referred self to ED for depression less than 24 hours after her roommate called 911 due to bizarre behavior. Pt denies SI/HI, but appears internally preoccupied, with marked poverty of thought and speech and limited insight. Based on collateral from pt's sister, pt is decompensating from baseline, unable to care for herself in the community with current services, with questionable adherence to her diabetes and HTN meds. Pt warrants immediate IPP admission for safety and stabilization when medically cleared. Will resume home meds, may be changed by IPP team at their discretion.

## 2019-05-28 NOTE — ED PROVIDER NOTE - PROGRESS NOTE DETAILS
D/w psychiatry who will see pt D/w Dr. Veloz of psychiatry who states will admit to Dr. Shady Norwood if pt is medically cleared. D/w Dr. Veloz of psychiatry who states will admit to Dr. Shayd Norwood if pt is medically cleared, states this presentation is consistent with an acute psychotic and less for a delirious episode. Dr. Anthony will f/u UA Case endorsed to Dr. Sanchez, pending placement in IPP. UA from 0030 5/28, negative for infection

## 2019-05-28 NOTE — ED PROVIDER NOTE - NSFOLLOWUPINSTRUCTIONS_ED_ALL_ED_FT
Anxiety    Generalized anxiety disorder (HIRAM) is a mental disorder. It is defined as anxiety that is not necessarily related to specific events or activities or is out of proportion to said events. Symptoms include restlessness, fatigue, difficulty concentrations, irritability and difficulty concentrating. It may interfere with life functions, including relationships, work, and school. If you were started on a medication, make sure to take exactly as prescribed and follow up with a psychiatrist.    SEEK IMMEDIATE MEDICAL CARE IF YOU HAVE ANY OF THE FOLLOWING SYMPTOMS: thoughts about hurting killing yourself, thoughts about hurting or killing somebody else, hallucinations, or worsening depression.

## 2019-05-28 NOTE — ED BEHAVIORAL HEALTH ASSESSMENT NOTE - HPI (INCLUDE ILLNESS QUALITY, SEVERITY, DURATION, TIMING, CONTEXT, MODIFYING FACTORS, ASSOCIATED SIGNS AND SYMPTOMS)
48 yo  female, single, domiciled at [address], long hx of schizophrenia, multiple past IPP admissions, PMH of DM II, HTN, referred self to ED for anxiety. Pt was seen in ED last night after her roommate called 911 because she "was acting weird" per ED chart. Pt was discharged home but returned to ED tonight because she felt anxious. Per ED team, pt appeared internally preoccupied, psych consulted for evaluation of psychosis.    Pt is a poor historian, internally preoccupied at times. Pt reports that she came to ED today because she "feel depressed" and has not been eating "in 2 days" because she feels depressed. When asked why pt came to ED last night, pt reports that she "felt depressed". Pt reports that she lives in [place], unable to provide more details about her housing except that she lives with a roommate. Pt reports that she has a  through [place], Kianna Longo, who sees her 3 times a week and whom she last saw 2 days ago. Pt reports that she takes "over 15 medications" but does not remember their names, but reports that she is adherent with meds. Pt reports that she "doesn't know" her psychiatrist's name but saw them 1 month ago. Pt denies suicidal or homicidal ideation at this time. Pt denies hallucinations and no overt delusions elicited, but presents with marked poverty of thought and speech.    Collateral from pt's sister Sangeeta Robertson: Per pt's sister, pt has been in and out of [facility] for the past 15 years. Pt's sister reports that pt was transferred to independent living in Jan 2019. Pt's sister feels uncomfortable with pt living in independent living as the last time pt lived independently 4 years ago, she stopped taking her diabetes meds and was admitted for DKA. Pt's sister reports that she talks to pt daily. On Friday, pt did not call her sister, and pt's sister suspected pt was decompensating. Pt's sister contacted pt's roommate today, who told her pt was aggressive last night and went to ED. Pt's sister does not think pt is at her baseline, appears more withdrawn, constricted, and has been saying that she is depressed. Pt's sister does not think pt is able to care for herself independently and is concerned that she is not adherent with meds.

## 2019-05-28 NOTE — ED PROVIDER NOTE - SKIN, MLM
dvt ppx: c/w coumadin Skin normal color for race, warm, dry and intact. No evidence of rash. dvt ppx: c/w coumadin    signed out to ed princess ontiveros

## 2019-05-28 NOTE — ED BEHAVIORAL HEALTH ASSESSMENT NOTE - CURRENT MEDICATION
confirmed with Soledad Cordero Rx:  Clozaril 100mg x4, Mirtazapine 7.5mg, Fluphenazine dec 25mg/ml q2 weeks last given 5/19

## 2019-05-28 NOTE — ED ADULT NURSE NOTE - HPI (INCLUDE ILLNESS QUALITY, SEVERITY, DURATION, TIMING, CONTEXT, MODIFYING FACTORS, ASSOCIATED SIGNS AND SYMPTOMS)
Patient received for signs of anxiety. Patient brought to bathroom to be changed into hospital gown. Refused to get off of the toilet. Refusing to speak to staff. Security notified and assisted patient back to bed. Patient awake with flat affect. Continues on 1 to 1 monitoring. All personal belongings removed. no known allergies

## 2019-05-28 NOTE — ED PROVIDER NOTE - OBJECTIVE STATEMENT
50 y/o F with PMH of depression, DM presents w cc of anxiety. Pt states she felt anxious while being at home today. Denies any SI or HI.  No CP, SOB. No n/v. No flank pain. No dysuria or hematuria.

## 2019-05-28 NOTE — ED BEHAVIORAL HEALTH ASSESSMENT NOTE - UNABLE TO CARE FOR SELF DETAILS
A/P:  1.  ADD: Stable.  2.  Insomnia: Stable.  3.  Asthma: Stable.  Meds refilled per orders.    S:  Chief Complaint   Patient presents with   • Behavioral Problem     follow up   • Sleep Problem     follow up   1.  ADD: Stable.  2.  Insomnia: Stable.  3.  Asthma: Stable.    I have reviewed the patient's medications and allergies, past medical, surgical, social and family history, updating these as appropriate.  See Histories section of the EMR for a display of this information.    Review of Systems:   As above per the HPI otherwise essentially negative.  Constitutional: Negative for fever and chills.   Respiratory: Negative for cough and shortness of breath.      O:  Blood pressure 129/78, pulse 92, temperature 98.2 °F (36.8 °C), temperature source Oral, height 5' 10\" (1.778 m), weight 78.8 kg. Body mass index is 24.93 kg/m².  Heart:  no click, no gallop, no heaves, no murmur, no rub, no thrills, regular rate and rhythm, S1 normal and S2 normal  Lungs:  clear to auscultation anteriorly and posteriorly bilaterally and normal percussion posteriorly bilaterally  Abdomen:  bowel sounds normal, no hernias, no masses, no hepatomegaly or spenomegally, not tender and soft  Extremities:  normal range of motion of all joints, no clubbing, no cyanosis, no deformity noted and no edema     see HPI

## 2019-05-28 NOTE — ED ADULT TRIAGE NOTE - CHIEF COMPLAINT QUOTE
pt BIBA after room mate called 911 because pt was acting weird. as per ems pt was acting catatonic towards roommate, EMS stated was catatonic at first and then began to talk. pt stated " I feel a little depressed" but denies any suicidal/homicidal ideations. pt denies any visual/auditory hallucinations. pt hx of DM  in triage.

## 2019-05-28 NOTE — ED BEHAVIORAL HEALTH ASSESSMENT NOTE - OTHER PAST PSYCHIATRIC HISTORY (INCLUDE DETAILS REGARDING ONSET, COURSE OF ILLNESS, INPATIENT/OUTPATIENT TREATMENT)
1st psych admission in early 20s, long hx of schizophrenia, pt has been in and out of [facility] for the past 15 years. Pt's sister reports that pt was transferred to independent living in Jan 2019. Current OP psych Dr Coats

## 2019-05-28 NOTE — ED BEHAVIORAL HEALTH ASSESSMENT NOTE - RISK ASSESSMENT
Pt is at chronic elevated risk due to hx of schizophrenia, multiple past admissions, poor insight and psychosis. Acute risk factors include questionable med adherence and recent aggressive behavior. Pt warrants immediate IPP admission for safety and stabilization when medically cleared.

## 2019-05-28 NOTE — ED PROVIDER NOTE - PHYSICAL EXAMINATION
Afebrile, hemodynamically stable, saturating well  NAD, non toxic appearing, laying in bed  Intermittent stopping and staring, appears internally stimulated  Head NCAT  Neck supple, full ROM  EOMI grossly, anicteric  MMM  RRR, nml S1/S2, no m/r/g  Lungs CTAB, no w/r/r  Abd soft, NT, ND, nml BS, no rebound or guarding  AAO, CN's 3-12 grossly intact, motor 5/5 in all extrems, no asterixis  CUEVA spontaneously, no leg cyanosis or edema  Skin warm, well perfused, no rashes or hives

## 2019-05-28 NOTE — ED PROVIDER NOTE - CLINICAL SUMMARY MEDICAL DECISION MAKING FREE TEXT BOX
No focal deficits to suggest CVA. No signs of trauma or other acute change to suggest ICH. No fever to suggest infection. Noted leukocytosis, no meningeal signs, evidence of cellulitis, UA.  Symptoms consistent with acute exacerbation/continuum of her underlying schizophrenia. No focal deficits to suggest CVA. No signs of trauma or other acute change to suggest ICH. No fever to suggest infection. Noted leukocytosis, no meningeal signs, evidence of cellulitis, UA pending though no symptoms of UTI. Symptoms consistent with acute exacerbation/continuum of her underlying schizophrenia. Admitted to psychiatry for further management. Dr. SOPHIA Anthony will f/u UA and medicate if positive, though from a medical standpoint pt would be cleared for psychiatric admission and PO abx. NAD, hemodynamically stable, afebrile, well appearing at this time.

## 2019-05-28 NOTE — ED PROVIDER NOTE - OBJECTIVE STATEMENT
49yoF with h/o depression, DM, presents with depression. States she needs medication for depression. When asked further, she states she has shakes, and then states she has nothing further to say. When asked specifically, reports sadness as a number of family members have recently . Reports sadness, decreased appetite, sleep, and energy level. Denies SI, HI, AH, VH, HA, chest/abd pain, dysuria, fever, or any other symptoms. [facility] states [facility] in [facility]. : Kianna Longo. Uncertain of psychiatrist or last time she visited. States compliant with her clozapine and Prolixin. Denies drug or alcohol use. Of note, was here in ED this AM with negative medical w/u, not seen by psychiatry at that time.

## 2019-05-28 NOTE — ED ADULT NURSE REASSESSMENT NOTE - NS ED NURSE REASSESS COMMENT FT1
Patient noted to be very withdrawn, not verbalizing concerns or complaints at this time. 1:1 maintained for patient safety. Patient able to transfer from stretcher to bed with simple commands. Calm and cooperative at this time.

## 2019-05-28 NOTE — ED PROVIDER NOTE - CLINICAL SUMMARY MEDICAL DECISION MAKING FREE TEXT BOX
No evidence of secondary causes of anxiety episode. Pt feels comfortable going home. Denies any SI or HI. I have full discussed the medical management and delivery of care with the patient. Patient confirms understanding and has been given detailed return precautions. Patient instructed to return to the ED should symptoms persist or worsen. Patient is well appearing upon discharge.

## 2019-05-29 DIAGNOSIS — E11.8 TYPE 2 DIABETES MELLITUS WITH UNSPECIFIED COMPLICATIONS: ICD-10-CM

## 2019-05-29 DIAGNOSIS — F20.0 PARANOID SCHIZOPHRENIA: ICD-10-CM

## 2019-05-29 DIAGNOSIS — J22 UNSPECIFIED ACUTE LOWER RESPIRATORY INFECTION: ICD-10-CM

## 2019-05-29 DIAGNOSIS — F32.9 MAJOR DEPRESSIVE DISORDER, SINGLE EPISODE, UNSPECIFIED: ICD-10-CM

## 2019-05-29 DIAGNOSIS — F23 BRIEF PSYCHOTIC DISORDER: ICD-10-CM

## 2019-05-29 PROBLEM — E11.9 TYPE 2 DIABETES MELLITUS WITHOUT COMPLICATIONS: Chronic | Status: ACTIVE | Noted: 2019-05-28

## 2019-05-29 LAB
AMPHET UR-MCNC: NEGATIVE — SIGNIFICANT CHANGE UP
BARBITURATES UR SCN-MCNC: NEGATIVE — SIGNIFICANT CHANGE UP
BENZODIAZ UR-MCNC: NEGATIVE — SIGNIFICANT CHANGE UP
COCAINE METAB.OTHER UR-MCNC: NEGATIVE — SIGNIFICANT CHANGE UP
CULTURE RESULTS: SIGNIFICANT CHANGE UP
METHADONE UR-MCNC: NEGATIVE — SIGNIFICANT CHANGE UP
OPIATES UR-MCNC: NEGATIVE — SIGNIFICANT CHANGE UP
PCP SPEC-MCNC: SIGNIFICANT CHANGE UP
PROPOXYPHENE QUALITATIVE URINE RESULT: NEGATIVE — SIGNIFICANT CHANGE UP
SPECIMEN SOURCE: SIGNIFICANT CHANGE UP

## 2019-05-29 RX ORDER — CLOZAPINE 150 MG/1
400 TABLET, ORALLY DISINTEGRATING ORAL AT BEDTIME
Refills: 0 | Status: DISCONTINUED | OUTPATIENT
Start: 2019-05-29 | End: 2019-05-30

## 2019-05-29 RX ORDER — AZITHROMYCIN 500 MG/1
500 TABLET, FILM COATED ORAL DAILY
Refills: 0 | Status: COMPLETED | OUTPATIENT
Start: 2019-05-29 | End: 2019-06-01

## 2019-05-29 RX ADMIN — Medication 100 MILLIGRAM(S): at 17:40

## 2019-05-29 RX ADMIN — LOSARTAN POTASSIUM 50 MILLIGRAM(S): 100 TABLET, FILM COATED ORAL at 17:36

## 2019-05-29 RX ADMIN — ATORVASTATIN CALCIUM 40 MILLIGRAM(S): 80 TABLET, FILM COATED ORAL at 17:42

## 2019-05-29 RX ADMIN — MIRTAZAPINE 7.5 MILLIGRAM(S): 45 TABLET, ORALLY DISINTEGRATING ORAL at 20:25

## 2019-05-29 RX ADMIN — Medication 2 MILLIGRAM(S): at 20:30

## 2019-05-29 RX ADMIN — AZITHROMYCIN 500 MILLIGRAM(S): 500 TABLET, FILM COATED ORAL at 21:43

## 2019-05-29 NOTE — ED ADULT NURSE REASSESSMENT NOTE - NS ED NURSE REASSESS COMMENT FT1
Patient sitting in bed. 1:1 sit present. Patient alert, unable to determine orientation. Patient nonverbal at this time, staring. VSS. No s/s of distress at this time. Patient waiting for bed in psych. Will continue to monitor.

## 2019-05-29 NOTE — H&P ADULT - NSHPLABSRESULTS_GEN_ALL_CORE
13.9   15.77 )-----------( 383      ( 28 May 2019 20:40 )             43.5       05-28    144  |  109  |  11  ----------------------------<  153<H>  4.1   |  14<L>  |  1.1    Ca    9.5      28 May 2019 20:40    TPro  6.8  /  Alb  3.9  /  TBili  0.3  /  DBili  x   /  AST  14  /  ALT  14  /  AlkPhos  98  05-28              Urinalysis Basic - ( 28 May 2019 00:26 )    Color: Yellow / Appearance: Cloudy / SG: >=1.030 / pH: x  Gluc: x / Ketone: >=80  / Bili: Negative / Urobili: 0.2   Blood: x / Protein: Trace / Nitrite: Negative   Leuk Esterase: Negative / RBC: x / WBC 10-25 /HPF   Sq Epi: x / Non Sq Epi: Many /HPF / Bacteria: Few /HPF            Lactate Trend  05-28 @ 00:50 Lactate:1.1             CAPILLARY BLOOD GLUCOSE      POCT Blood Glucose.: 223 mg/dL (29 May 2019 11:59)      < from: Xray Chest 2 Views PA/Lat (05.28.19 @ 22:18) >    Impression:      Low lung volumes with no radiographic evidence of acute cardiopulmonary   disease.      REVA PALENCIA M.D., RESIDENT RADIOLOGIST  This document has been electronically signed.  TOMASA PROCTOR M.D., ATTENDING RADIOLOGIST  This document has been electronically signed. May 29 2019  9:50AM

## 2019-05-29 NOTE — H&P ADULT - PROBLEM SELECTOR PLAN 1
admit in patient psych  monitor and manage medications  supportive care management  medicine consult  psych attending seen pt and orders written, patient to be followed by psych

## 2019-05-29 NOTE — H&P ADULT - HISTORY OF PRESENT ILLNESS
Pt with schizophrenia; known to inpt unit, came to ED because she was "feeling sad". Pt seen and initially released from ED, was brought back after roommate at apartment program said she was 'acting weird". No s/h ideation.  Pt admits to auditory hallucinations saying "a lot of things" .   Pt compliant at present time. Denies any medical complaints, no chest pain , no dyspnea, no recent illness or fever or chills.  Pt states has mildly productive cough x 1 week with yellow sputum.  Pt denies any documented fevers.    Pt denies any GI/ complaints

## 2019-05-29 NOTE — H&P ADULT - NSHPREVIEWOFSYSTEMS_GEN_ALL_CORE
REVIEW OF SYSTEMS:    CONSTITUTIONAL: No weakness, fevers or chills  EYES/ENT: No visual changes;  No vertigo or throat pain   NECK: No pain or stiffness  RESPIRATORY: + cough, wheezing, hemoptysis; No shortness of breath  CARDIOVASCULAR: No chest pain or palpitations  GASTROINTESTINAL: No abdominal or epigastric pain. No nausea, vomiting, or hematemesis; No diarrhea or constipation. No melena or hematochezia.  GENITOURINARY: No dysuria, frequency or hematuria  NEUROLOGICAL: No numbness or weakness  SKIN: No itching, rashes

## 2019-05-29 NOTE — H&P ADULT - NSHPPHYSICALEXAM_GEN_ALL_CORE
GENERAL:  48y/o mildly obese Female NAD, resting comfortably.  HEAD:  Atraumatic, Normocephalic  EYES: EOMI, PERRLA, conjunctiva and sclera clear  NECK: Supple, No JVD, no cervical lymphadenopathy, non-tender  CHEST/LUNG: +left upper lobe congestion to auscultation, rt side clear ; Mild wheeze, rhonchi, or rales  HEART: Regular rate and rhythm; S1&S2  ABDOMEN: Soft, Nontender, Nondistended x 4 quadrants; Bowel sounds present  EXTREMITIES:   Peripheral Pulses Present, No clubbing, no cyanosis, or no edema, no calf tenderness  PSYCH: AAOx3, cooperative, appropriate  NEUROLOGY: WNL  SKIN: WNL

## 2019-05-30 DIAGNOSIS — Z02.9 ENCOUNTER FOR ADMINISTRATIVE EXAMINATIONS, UNSPECIFIED: ICD-10-CM

## 2019-05-30 LAB
ALBUMIN SERPL ELPH-MCNC: 3.7 G/DL — SIGNIFICANT CHANGE UP (ref 3.5–5.2)
ALP SERPL-CCNC: 97 U/L — SIGNIFICANT CHANGE UP (ref 30–115)
ALT FLD-CCNC: 14 U/L — SIGNIFICANT CHANGE UP (ref 0–41)
ANION GAP SERPL CALC-SCNC: 14 MMOL/L — SIGNIFICANT CHANGE UP (ref 7–14)
AST SERPL-CCNC: 28 U/L — SIGNIFICANT CHANGE UP (ref 0–41)
BILIRUB SERPL-MCNC: 0.5 MG/DL — SIGNIFICANT CHANGE UP (ref 0.2–1.2)
BUN SERPL-MCNC: 13 MG/DL — SIGNIFICANT CHANGE UP (ref 10–20)
CALCIUM SERPL-MCNC: 9.9 MG/DL — SIGNIFICANT CHANGE UP (ref 8.5–10.1)
CHLORIDE SERPL-SCNC: 111 MMOL/L — HIGH (ref 98–110)
CHOLEST SERPL-MCNC: 145 MG/DL — SIGNIFICANT CHANGE UP (ref 100–200)
CO2 SERPL-SCNC: 18 MMOL/L — SIGNIFICANT CHANGE UP (ref 17–32)
CREAT SERPL-MCNC: 0.8 MG/DL — SIGNIFICANT CHANGE UP (ref 0.7–1.5)
ESTIMATED AVERAGE GLUCOSE: 209 MG/DL — HIGH (ref 68–114)
GLUCOSE BLDC GLUCOMTR-MCNC: 139 MG/DL — HIGH (ref 70–99)
GLUCOSE BLDC GLUCOMTR-MCNC: 99 MG/DL — SIGNIFICANT CHANGE UP (ref 70–99)
GLUCOSE SERPL-MCNC: 133 MG/DL — HIGH (ref 70–99)
HBA1C BLD-MCNC: 8.9 % — HIGH (ref 4–5.6)
HDLC SERPL-MCNC: 38 MG/DL — LOW
LIPID PNL WITH DIRECT LDL SERPL: 89 MG/DL — SIGNIFICANT CHANGE UP (ref 4–129)
POTASSIUM SERPL-MCNC: 3.9 MMOL/L — SIGNIFICANT CHANGE UP (ref 3.5–5)
POTASSIUM SERPL-SCNC: 3.9 MMOL/L — SIGNIFICANT CHANGE UP (ref 3.5–5)
PROT SERPL-MCNC: 6.7 G/DL — SIGNIFICANT CHANGE UP (ref 6–8)
SODIUM SERPL-SCNC: 143 MMOL/L — SIGNIFICANT CHANGE UP (ref 135–146)
TOTAL CHOLESTEROL/HDL RATIO MEASUREMENT: 3.8 RATIO — LOW (ref 4–5.5)
TRIGL SERPL-MCNC: 152 MG/DL — HIGH (ref 10–149)

## 2019-05-30 PROCEDURE — 99232 SBSQ HOSP IP/OBS MODERATE 35: CPT

## 2019-05-30 RX ORDER — CLOZAPINE 150 MG/1
25 TABLET, ORALLY DISINTEGRATING ORAL DAILY
Refills: 0 | Status: DISCONTINUED | OUTPATIENT
Start: 2019-05-30 | End: 2019-05-30

## 2019-05-30 RX ORDER — CLOZAPINE 150 MG/1
TABLET, ORALLY DISINTEGRATING ORAL
Refills: 0 | Status: COMPLETED | OUTPATIENT
Start: 2019-05-30 | End: 2019-06-02

## 2019-05-30 RX ORDER — CLOZAPINE 150 MG/1
125 TABLET, ORALLY DISINTEGRATING ORAL AT BEDTIME
Refills: 0 | Status: COMPLETED | OUTPATIENT
Start: 2019-06-01 | End: 2019-06-01

## 2019-05-30 RX ORDER — CLOZAPINE 150 MG/1
50 TABLET, ORALLY DISINTEGRATING ORAL AT BEDTIME
Refills: 0 | Status: COMPLETED | OUTPATIENT
Start: 2019-05-30 | End: 2019-05-30

## 2019-05-30 RX ORDER — CLOZAPINE 150 MG/1
TABLET, ORALLY DISINTEGRATING ORAL
Refills: 0 | Status: DISCONTINUED | OUTPATIENT
Start: 2019-05-30 | End: 2019-05-30

## 2019-05-30 RX ORDER — FLUPHENAZINE HYDROCHLORIDE 1 MG/1
37.5 TABLET, FILM COATED ORAL ONCE
Refills: 0 | Status: COMPLETED | OUTPATIENT
Start: 2019-05-30 | End: 2019-05-30

## 2019-05-30 RX ORDER — CLOZAPINE 150 MG/1
100 TABLET, ORALLY DISINTEGRATING ORAL AT BEDTIME
Refills: 0 | Status: COMPLETED | OUTPATIENT
Start: 2019-05-31 | End: 2019-05-31

## 2019-05-30 RX ORDER — INSULIN GLARGINE 100 [IU]/ML
20 INJECTION, SOLUTION SUBCUTANEOUS AT BEDTIME
Refills: 0 | Status: DISCONTINUED | OUTPATIENT
Start: 2019-05-30 | End: 2019-06-06

## 2019-05-30 RX ORDER — CLOZAPINE 150 MG/1
150 TABLET, ORALLY DISINTEGRATING ORAL AT BEDTIME
Refills: 0 | Status: COMPLETED | OUTPATIENT
Start: 2019-06-02 | End: 2019-06-02

## 2019-05-30 RX ADMIN — Medication 100 MILLIGRAM(S): at 11:00

## 2019-05-30 RX ADMIN — CLOZAPINE 50 MILLIGRAM(S): 150 TABLET, ORALLY DISINTEGRATING ORAL at 20:16

## 2019-05-30 RX ADMIN — FLUPHENAZINE HYDROCHLORIDE 37.5 MILLIGRAM(S): 1 TABLET, FILM COATED ORAL at 16:51

## 2019-05-30 RX ADMIN — PIOGLITAZONE HYDROCHLORIDE 30 MILLIGRAM(S): 15 TABLET ORAL at 11:01

## 2019-05-30 RX ADMIN — MIRTAZAPINE 7.5 MILLIGRAM(S): 45 TABLET, ORALLY DISINTEGRATING ORAL at 20:34

## 2019-05-30 RX ADMIN — ATORVASTATIN CALCIUM 40 MILLIGRAM(S): 80 TABLET, FILM COATED ORAL at 20:16

## 2019-05-30 RX ADMIN — AZITHROMYCIN 500 MILLIGRAM(S): 500 TABLET, FILM COATED ORAL at 11:00

## 2019-05-30 RX ADMIN — CLOZAPINE 25 MILLIGRAM(S): 150 TABLET, ORALLY DISINTEGRATING ORAL at 11:10

## 2019-05-30 RX ADMIN — LOSARTAN POTASSIUM 50 MILLIGRAM(S): 100 TABLET, FILM COATED ORAL at 11:01

## 2019-05-30 NOTE — CONSULT NOTE ADULT - ASSESSMENT
pt stable   medically stable with no acute issues  h/o diabetes on insulin   see hpi   pa note reviewd

## 2019-05-30 NOTE — PROGRESS NOTE BEHAVIORAL HEALTH - NSBHFUPINTERVALHXFT_PSY_A_CORE
Pt seen and examined. Chart reviewed. She was initially seen to be mute, lying on her bed, with her arms outstretched, and did not respond to writer's questions. Patient was re-evaluated 10 minutes later, and she was no longer mute, and told writer she was "Exercising, praying, and concentrating". She states that she does this often. She reports that she has been hearing voices telling her to "stay calm". She states that at times the voices tell her bad things but she will not elaborate. She denies thoughts of wanting to harm herself or others. She was unaware that she refused her medications last night. She admits that she did not take her clozaril for about 2-3 days prior to coming to the ED. She also reports not taking her other medications including her diabetic meds. She states that she did not take her medications because "I was exhausted. I don't know". She states that she is hungry. She reports sleeping well last night. She denies any other acute complaints at this time.    spoke to pt's sister: Sangeeta Robertson: she states that her sister has most likely not been compliant with her medications and has been decompensating for the past few weeks. She states that she does not feel like her sister can handle living on her own and she would like her to be transferred to [facility] after this admission. Pt seen and examined. Chart reviewed. She was initially seen to be mute, lying on her bed, with her arms outstretched, and did not respond to writer's questions. Patient was re-evaluated 10 minutes later, and she was no longer mute, and told writer she was "Exercising, praying, and concentrating". She states that she does this often. She reports that she has been hearing voices telling her to "stay calm". She states that at times the voices tell her bad things but she will not elaborate. She denies thoughts of wanting to harm herself or others. She was unaware that she refused her medications last night. She admits that she did not take her clozaril for about 2-3 days prior to coming to the ED. She also reports not taking her other medications including her diabetic meds. She states that she did not take her medications because "I was exhausted. I don't know". She states that she is hungry. She reports sleeping well last night. She denies any other acute complaints at this time.    spoke to pt's sister: Sangeeta Robertson: she states that her sister has most likely not been compliant with her medications and has been decompensating for the past few weeks. She states that she does not feel like her sister can handle living on her own and she would like her to be transferred to [facility] after this admission.    spoke to pt's therapist- Fabian [phone]: she states that patient follows with NP Zeenat Coats who is currently on vacation until next week. She reports that patient has been on clozaril for several years and has usually been compliant with her medication and bloodwork. She states that she received report from pt's  that patient's pill count was off and that it seemed patient was not compliant with her medications for the past week. She states that she last saw patient on 19 and that patient was doing well. She reports that patient last received prolixin dec 25mg IM Q2week on 5/15/19 and was due for injection yesterday. She reports that patient's sister's boyfriend recently  by suicide and this has been especially difficult for patient to cope with, which could explain her recent medication noncompliance and decompensation.

## 2019-05-30 NOTE — CONSULT NOTE ADULT - SUBJECTIVE AND OBJECTIVE BOX
TONY OCONNOR  49y  Female      Patient is a 49y old  Female who presents with a chief complaint of became increasingly depressed (29 May 2019 17:53)    HPI:  Pt with schizophrenia; known to inpt unit, came to ED because she was "feeling sad". Pt seen and initially released from ED, was brought back after roommate at apartment program said she was 'acting weird". No s/h ideation.  Pt admits to auditory hallucinations saying "a lot of things" .   Pt compliant at present time. Denies any medical complaints, no chest pain , no dyspnea, no recent illness or fever or chills.  Pt states has mildly productive cough x 1 week with yellow sputum.  Pt denies any documented fevers.    Pt denies any GI/ complaints (29 May 2019 18:00)    INTERVAL HPI/OVERNIGHT EVENTS:  HEALTH ISSUES - PROBLEM Dx:  Lower respiratory infection: Lower respiratory infection  Depression, unspecified depression type: Depression, unspecified depression type  Type 2 diabetes mellitus with complication, with long-term current use of insulin: Type 2 diabetes mellitus with complication, with long-term current use of insulin  Acute psychosis: Acute psychosis  Paranoid schizophrenia  Schizophrenia        PAST MEDICAL & SURGICAL HISTORY:  Diabetes mellitus, type 2  Depression  No significant past surgical history    FAMILY HISTORY:  No pertinent family history in first degree relatives    acetaminophen   Tablet .. 650 milliGRAM(s) Oral every 6 hours PRN  atorvastatin 40 milliGRAM(s) Oral at bedtime  azithromycin   Tablet 500 milliGRAM(s) Oral daily  cloZAPine 400 milliGRAM(s) Oral at bedtime  docusate sodium 100 milliGRAM(s) Oral daily  haloperidol     Tablet 5 milliGRAM(s) Oral every 6 hours PRN  LORazepam     Tablet 2 milliGRAM(s) Oral every 6 hours PRN  losartan 50 milliGRAM(s) Oral daily  mirtazapine 7.5 milliGRAM(s) Oral at bedtime  pioglitazone 30 milliGRAM(s) Oral daily      REVIEW OF SYSTEMS:  CONSTITUTIONAL: No fever, weight loss, or fatigue  EYES: No eye pain, visual disturbances, or discharge  ENMT:  No difficulty hearing, tinnitus, vertigo; No sinus or throat pain  NECK: No pain or stiffness  BREASTS: No pain, masses, or nipple discharge  RESPIRATORY: No cough, wheezing, chills or hemoptysis; No shortness of breath  CARDIOVASCULAR: No chest pain, palpitations, dizziness, or leg swelling  GASTROINTESTINAL: No abdominal or epigastric pain. No nausea, vomiting, or hematemesis; No diarrhea or constipation. No melena or hematochezia.  GENITOURINARY: No dysuria, frequency, hematuria, or incontinence  NEUROLOGICAL: No headaches, memory loss, loss of strength, numbness, or tremors  SKIN: No itching, burning, rashes, or lesions   LYMPH NODES: No enlarged glands  ENDOCRINE: No heat or cold intolerance; No hair loss  MUSCULOSKELETAL: No joint pain or swelling; No muscle, back, or extremity pain  PSYCHIATRIC: as per hpi and previous psych history  HEME/LYMPH: No easy bruising, or bleeding gums  ALLERY AND IMMUNOLOGIC: No hives or eczema    T(C): 36.5 (05-30-19 @ 05:52), Max: 36.9 (05-29-19 @ 16:15)  HR: 80 (05-30-19 @ 05:52) (80 - 80)  BP: 135/78 (05-30-19 @ 05:52) (135/78 - 135/78)  RR: 19 (05-30-19 @ 05:52) (18 - 19)  SpO2: --  Wt(kg): --Vital Signs Last 24 Hrs  T(C): 36.5 (30 May 2019 05:52), Max: 36.9 (29 May 2019 16:15)  T(F): 97.7 (30 May 2019 05:52), Max: 98.5 (29 May 2019 16:15)  HR: 80 (30 May 2019 05:52) (80 - 80)  BP: 135/78 (30 May 2019 05:52) (135/78 - 135/78)  BP(mean): --  RR: 19 (30 May 2019 05:52) (18 - 19)  SpO2: --    PHYSICAL EXAM:  GENERAL: NAD,well-developed  HEAD:  Atraumatic, Normocephalic  EYES: EOMI, PERRLA, conjunctiva and sclera clear  ENMT: No tonsillar erythema, exudates, or enlargement; Moist mucous membranes, Good dentition, No lesions  NECK: Supple, No JVD, Normal thyroid  CHEST/LUNG: Clear bs bilaterally; No rales, rhonchi, wheezing  HEART: Regular rate and rhythm; No murmurs, rubs, or gallops  ABDOMEN: Soft, Nontender, Nondistended; Bowel sounds present  EXTREMITIES:  2+ Peripheral Pulses, No clubbing, cyanosis, or edema  LYMPH: No lymphadenopathy noted  SKIN: No rashes or lesions  Neuro: alert  no focal deficits    Consultant(s) Notes Reviewed:  [x ] YES  [ ] NO  Care Discussed with Consultants/Other Providers [ x] YES  [ ] NO    LABS:                        13.9   15.77 )-----------( 383      ( 28 May 2019 20:40 )             43.5     05-28    144  |  109  |  11  ----------------------------<  153<H>  4.1   |  14<L>  |  1.1    Ca    9.5      28 May 2019 20:40    TPro  6.8  /  Alb  3.9  /  TBili  0.3  /  DBili  x   /  AST  14  /  ALT  14  /  AlkPhos  98  05-28        CAPILLARY BLOOD GLUCOSE      POCT Blood Glucose.: 223 mg/dL (29 May 2019 11:59)            RADIOLOGY & ADDITIONAL TESTS:    Imaging Personally Reviewed:  [ ] YES  [ ] NO

## 2019-05-30 NOTE — CHART NOTE - NSCHARTNOTEFT_GEN_A_CORE
Pt. is a 49 year old, single  female who was admitted after being brought to the emergency room by her roommate who stated pt. "was acting weird".  Pt. has a diagnosis of schizophrenia and reported experiencing auditory hallucinations that were reportedly "saying different things". Pt. also reported feeling depressed while in the emergency room. Upon this morning's interview, pt. was observed staring intently at her hands which she was holding in the air.  She did not engage with the treatment team or respond to questions, nor did she move from the observed position.  Pt. resides in a shared apartment program and has a , Kianna Longo, through [facility].  As per pt.'s sister, she was discharged from [facility] in January and has a history of multiple IPP admissions since her early 20's.  Pt. is treated by Dr. Coats at [facility].  Upon discharge, pt. will return home to her residence and will resume outpatient mental health services with Dr. Coats.  Pt. is not psychiatrically stable for discharge at this time.

## 2019-05-30 NOTE — PROGRESS NOTE BEHAVIORAL HEALTH - SUMMARY
48 yo  female with long hx of schizophrenia, DMII and HTN, transferred from [facility] to independent living in Jan 2019, referred self to ED for depression less than 24 hours after her roommate called 911 due to bizarre behavior.    Upon assessment, patient is a poor historian but appears internally preoccupied, disorganized, with poor insight into her psychiatric illness.     #Schizophrenia  - Since patient has not been compliant with her clozaril, will restart clozaril 25mg PO Qdaily with plan to titrate up.   - Per patient's pharmacy, [facility], she is on 50 yo  female with long hx of schizophrenia, DMII and HTN, transferred [place] [facility] [place] independent living in Jan 2019, referred self to ED for depression less than 24 hours after her roommate called 911 due to bizarre behavior.    Upon assessment, patient is a poor historian but appears internally preoccupied, disorganized, with poor insight into her psychiatric illness.     #Schizophrenia  - Since patient has not been compliant with her clozaril, will restart clozaril 25mg PO BID with plan to titrate up.   - Per collateral, patient last received prolixin dec 25mg IM Q2week on 5/15/19 and was due for injection yesterday. Since we are still titrating clozaril, will increase prolixin dec dose to 37.5mg to be administered today.     #Depressive symptoms, Insomnia  -c/w remeron 7.5mg PO QHS    #Chronic Medical Issues   - c/w home medications 48 yo  female with long hx of schizophrenia, DMII and HTN, transferred from [facility] to independent living in Jan 2019, referred self to ED for depression less than 24 hours after her roommate called 911 due to bizarre behavior.    Upon assessment, patient is a poor historian but appears internally preoccupied, disorganized, with poor insight into her psychiatric illness.     #Schizophrenia  - Since patient has not been compliant with her clozaril, will restart clozaril 25mg PO Qam and 50 mg qhs with plan to titrate up daily as pt has been on clozaril for many years and has tolerated it well.   - Per collateral, patient last received prolixin dec 25mg IM Q2week on 5/15/19 and was due for injection yesterday. Since we are still titrating clozaril, will increase prolixin dec dose to 37.5mg to be administered today.     #Depressive symptoms, Insomnia  -c/w remeron 7.5mg PO QHS    #Chronic Medical Issues   - c/w home medications

## 2019-05-31 LAB
GLUCOSE BLDC GLUCOMTR-MCNC: 90 MG/DL — SIGNIFICANT CHANGE UP (ref 70–99)
GLUCOSE BLDC GLUCOMTR-MCNC: 91 MG/DL — SIGNIFICANT CHANGE UP (ref 70–99)
GLUCOSE BLDC GLUCOMTR-MCNC: 95 MG/DL — SIGNIFICANT CHANGE UP (ref 70–99)

## 2019-05-31 PROCEDURE — 99232 SBSQ HOSP IP/OBS MODERATE 35: CPT

## 2019-05-31 RX ADMIN — PIOGLITAZONE HYDROCHLORIDE 30 MILLIGRAM(S): 15 TABLET ORAL at 08:07

## 2019-05-31 RX ADMIN — LOSARTAN POTASSIUM 50 MILLIGRAM(S): 100 TABLET, FILM COATED ORAL at 08:07

## 2019-05-31 RX ADMIN — AZITHROMYCIN 500 MILLIGRAM(S): 500 TABLET, FILM COATED ORAL at 08:07

## 2019-05-31 RX ADMIN — Medication 100 MILLIGRAM(S): at 08:07

## 2019-05-31 NOTE — PROGRESS NOTE BEHAVIORAL HEALTH - SUMMARY
50 yo  female with long hx of schizophrenia, DMII and HTN, transferred from [facility] to independent living in Jan 2019, referred self to ED for depression less than 24 hours after her roommate called 911 due to bizarre behavior.    Upon assessment, patient remains internally preoccupied but has been compliant with medications.   #Schizophrenia  - continue with clozaril taper (100mg QHS tonight, 125mg QHS tomorrow, 150mg QHS Sunday).   - Pt received prolixin dec 37.5mg [id number] IM injection yesterday 5/30/19; next dose due 6/13/19.    #Depressive symptoms, Insomnia  -c/w remeron 7.5mg PO QHS    #Chronic Medical Issues   - c/w home medications

## 2019-05-31 NOTE — PROGRESS NOTE BEHAVIORAL HEALTH - NSBHFUPINTERVALHXFT_PSY_A_CORE
Pt seen and examined. Chart reviewed. She states she feels "exhausted". She reports she has been feeling sad because of "mean people". She does not elaborate how people have been mean to her. She reports sleeping well last night. She states that she is still hearing voices that are telling her "things will get better". She denies any thoughts of wanting to harm herself or others. She reports no side effects to her medications. She is requesting glucerna beverage with her meals. She denies any other acute complaints at this time.

## 2019-06-01 LAB
ALBUMIN SERPL ELPH-MCNC: 3.4 G/DL — LOW (ref 3.5–5.2)
ALP SERPL-CCNC: 93 U/L — SIGNIFICANT CHANGE UP (ref 30–115)
ALT FLD-CCNC: 16 U/L — SIGNIFICANT CHANGE UP (ref 0–41)
ANION GAP SERPL CALC-SCNC: 14 MMOL/L — SIGNIFICANT CHANGE UP (ref 7–14)
AST SERPL-CCNC: 23 U/L — SIGNIFICANT CHANGE UP (ref 0–41)
BILIRUB SERPL-MCNC: 0.4 MG/DL — SIGNIFICANT CHANGE UP (ref 0.2–1.2)
BUN SERPL-MCNC: 11 MG/DL — SIGNIFICANT CHANGE UP (ref 10–20)
CALCIUM SERPL-MCNC: 9.6 MG/DL — SIGNIFICANT CHANGE UP (ref 8.5–10.1)
CHLORIDE SERPL-SCNC: 107 MMOL/L — SIGNIFICANT CHANGE UP (ref 98–110)
CHOLEST SERPL-MCNC: 126 MG/DL — SIGNIFICANT CHANGE UP (ref 100–200)
CO2 SERPL-SCNC: 20 MMOL/L — SIGNIFICANT CHANGE UP (ref 17–32)
CREAT SERPL-MCNC: 0.8 MG/DL — SIGNIFICANT CHANGE UP (ref 0.7–1.5)
ESTIMATED AVERAGE GLUCOSE: 183 MG/DL — HIGH (ref 68–114)
GLUCOSE BLDC GLUCOMTR-MCNC: 134 MG/DL — HIGH (ref 70–99)
GLUCOSE BLDC GLUCOMTR-MCNC: 79 MG/DL — SIGNIFICANT CHANGE UP (ref 70–99)
GLUCOSE BLDC GLUCOMTR-MCNC: 91 MG/DL — SIGNIFICANT CHANGE UP (ref 70–99)
GLUCOSE SERPL-MCNC: 87 MG/DL — SIGNIFICANT CHANGE UP (ref 70–99)
HBA1C BLD-MCNC: 8 % — HIGH (ref 4–5.6)
HDLC SERPL-MCNC: 33 MG/DL — LOW
LIPID PNL WITH DIRECT LDL SERPL: 73 MG/DL — SIGNIFICANT CHANGE UP (ref 4–129)
POTASSIUM SERPL-MCNC: 4.1 MMOL/L — SIGNIFICANT CHANGE UP (ref 3.5–5)
POTASSIUM SERPL-SCNC: 4.1 MMOL/L — SIGNIFICANT CHANGE UP (ref 3.5–5)
PROT SERPL-MCNC: 6.1 G/DL — SIGNIFICANT CHANGE UP (ref 6–8)
SODIUM SERPL-SCNC: 141 MMOL/L — SIGNIFICANT CHANGE UP (ref 135–146)
TOTAL CHOLESTEROL/HDL RATIO MEASUREMENT: 3.8 RATIO — LOW (ref 4–5.5)
TRIGL SERPL-MCNC: 131 MG/DL — SIGNIFICANT CHANGE UP (ref 10–149)

## 2019-06-01 PROCEDURE — 99231 SBSQ HOSP IP/OBS SF/LOW 25: CPT

## 2019-06-01 RX ADMIN — Medication 100 MILLIGRAM(S): at 09:04

## 2019-06-01 RX ADMIN — AZITHROMYCIN 500 MILLIGRAM(S): 500 TABLET, FILM COATED ORAL at 09:04

## 2019-06-01 RX ADMIN — ATORVASTATIN CALCIUM 40 MILLIGRAM(S): 80 TABLET, FILM COATED ORAL at 20:18

## 2019-06-01 RX ADMIN — MIRTAZAPINE 7.5 MILLIGRAM(S): 45 TABLET, ORALLY DISINTEGRATING ORAL at 20:17

## 2019-06-01 RX ADMIN — CLOZAPINE 125 MILLIGRAM(S): 150 TABLET, ORALLY DISINTEGRATING ORAL at 20:18

## 2019-06-01 RX ADMIN — INSULIN GLARGINE 20 UNIT(S): 100 INJECTION, SOLUTION SUBCUTANEOUS at 21:14

## 2019-06-01 RX ADMIN — LOSARTAN POTASSIUM 50 MILLIGRAM(S): 100 TABLET, FILM COATED ORAL at 09:04

## 2019-06-01 RX ADMIN — PIOGLITAZONE HYDROCHLORIDE 30 MILLIGRAM(S): 15 TABLET ORAL at 09:04

## 2019-06-01 NOTE — PROGRESS NOTE BEHAVIORAL HEALTH - NSBHCHARTREVIEWINVESTIGATE_PSY_A_CORE FT
< from: 12 Lead ECG (05.28.19 @ 20:46) >      Ventricular Rate 127 BPM    Atrial Rate 127 BPM    P-R Interval 134 ms    QRS Duration 102 ms    Q-T Interval 324 ms    QTC Calculation(Bezet) 470 ms    P Axis 52 degrees    R Axis -69 degrees    T Axis 34 degrees    Diagnosis Line Sinus tachycardia  Left anterior fascicular block  Poor R wave progression  Abnormal ECG    < end of copied text >

## 2019-06-01 NOTE — PROGRESS NOTE BEHAVIORAL HEALTH - NSBHFUPINTERVALHXFT_PSY_A_CORE
Pt seen and examined. Chart reviewed. She states she feels "exhausted". . She reports sleeping well last night, mood depress  She states that she is still hearing voices that are praying .. She denies any thoughts of wanting to harm herself or others. She reports no side effects to her medications. She denies any other acute complaints at this time. She is on Clozaril

## 2019-06-01 NOTE — PROGRESS NOTE BEHAVIORAL HEALTH - SUMMARY
48 yo  female with long hx of schizophrenia, DMII and HTN, transferred from [facility] [place] independent living in Jan 2019, referred self to ED for depression less than 24 hours after her roommate called 911 due to bizarre behavior.    Upon assessment, patient remains internally preoccupied but has been compliant with medications.   #Schizophrenia  - continue with clozaril taper (100mg QHS tonight, 125mg QHS tomorrow, 150mg QHS Sunday).   - Pt received prolixin dec 37.5mg [id number] IM injection yesterday 5/30/19; next dose due 6/13/19.    #Depressive symptoms, Insomnia  -c/w remeron 7.5mg PO QHS    #Chronic Medical Issues   - c/w home medications

## 2019-06-02 LAB
GLUCOSE BLDC GLUCOMTR-MCNC: 101 MG/DL — HIGH (ref 70–99)
GLUCOSE BLDC GLUCOMTR-MCNC: 124 MG/DL — HIGH (ref 70–99)
TSH SERPL-MCNC: 1.23 UIU/ML — SIGNIFICANT CHANGE UP (ref 0.27–4.2)

## 2019-06-02 PROCEDURE — 99231 SBSQ HOSP IP/OBS SF/LOW 25: CPT

## 2019-06-02 RX ADMIN — LOSARTAN POTASSIUM 50 MILLIGRAM(S): 100 TABLET, FILM COATED ORAL at 08:43

## 2019-06-02 RX ADMIN — INSULIN GLARGINE 20 UNIT(S): 100 INJECTION, SOLUTION SUBCUTANEOUS at 21:15

## 2019-06-02 RX ADMIN — CLOZAPINE 150 MILLIGRAM(S): 150 TABLET, ORALLY DISINTEGRATING ORAL at 21:05

## 2019-06-02 RX ADMIN — Medication 100 MILLIGRAM(S): at 08:43

## 2019-06-02 RX ADMIN — MIRTAZAPINE 7.5 MILLIGRAM(S): 45 TABLET, ORALLY DISINTEGRATING ORAL at 21:06

## 2019-06-02 RX ADMIN — ATORVASTATIN CALCIUM 40 MILLIGRAM(S): 80 TABLET, FILM COATED ORAL at 21:06

## 2019-06-02 RX ADMIN — PIOGLITAZONE HYDROCHLORIDE 30 MILLIGRAM(S): 15 TABLET ORAL at 08:43

## 2019-06-02 NOTE — PROGRESS NOTE BEHAVIORAL HEALTH - SUMMARY
48 yo  female with long hx of schizophrenia, DMII and HTN, [place] [facility] [place], referred self to ED for depression less than 24 hours after her roommate called 911 due to bizarre behavior.    Upon assessment, patient remains internally preoccupied but has been compliant with medications.   #Schizophrenia  - continue with clozaril taper (100mg QHS tonight, 125mg QHS tomorrow, 150mg QHS Sunday).   - Pt received prolixin dec 37.5mg [id number] IM injection yesterday 5/30/19; next dose due 6/13/19.    #Depressive symptoms, Insomnia  -c/w remeron 7.5mg PO QHS    #Chronic Medical Issues   - c/w home medications

## 2019-06-03 LAB
CULTURE RESULTS: SIGNIFICANT CHANGE UP
CULTURE RESULTS: SIGNIFICANT CHANGE UP
GLUCOSE BLDC GLUCOMTR-MCNC: 100 MG/DL — HIGH (ref 70–99)
GLUCOSE BLDC GLUCOMTR-MCNC: 108 MG/DL — HIGH (ref 70–99)
GLUCOSE BLDC GLUCOMTR-MCNC: 174 MG/DL — HIGH (ref 70–99)
SPECIMEN SOURCE: SIGNIFICANT CHANGE UP
SPECIMEN SOURCE: SIGNIFICANT CHANGE UP

## 2019-06-03 PROCEDURE — 99231 SBSQ HOSP IP/OBS SF/LOW 25: CPT

## 2019-06-03 RX ORDER — CLOZAPINE 150 MG/1
175 TABLET, ORALLY DISINTEGRATING ORAL AT BEDTIME
Refills: 0 | Status: DISCONTINUED | OUTPATIENT
Start: 2019-06-03 | End: 2019-06-05

## 2019-06-03 RX ADMIN — PIOGLITAZONE HYDROCHLORIDE 30 MILLIGRAM(S): 15 TABLET ORAL at 08:37

## 2019-06-03 RX ADMIN — ATORVASTATIN CALCIUM 40 MILLIGRAM(S): 80 TABLET, FILM COATED ORAL at 20:49

## 2019-06-03 RX ADMIN — INSULIN GLARGINE 20 UNIT(S): 100 INJECTION, SOLUTION SUBCUTANEOUS at 20:50

## 2019-06-03 RX ADMIN — Medication 100 MILLIGRAM(S): at 08:37

## 2019-06-03 RX ADMIN — CLOZAPINE 175 MILLIGRAM(S): 150 TABLET, ORALLY DISINTEGRATING ORAL at 20:48

## 2019-06-03 RX ADMIN — MIRTAZAPINE 7.5 MILLIGRAM(S): 45 TABLET, ORALLY DISINTEGRATING ORAL at 20:49

## 2019-06-03 RX ADMIN — LOSARTAN POTASSIUM 50 MILLIGRAM(S): 100 TABLET, FILM COATED ORAL at 06:11

## 2019-06-03 NOTE — PROGRESS NOTE BEHAVIORAL HEALTH - SUMMARY
50 yo  female with long hx of schizophrenia, DMII and HTN, transferred [place] [facility] [place] independent living in Jan 2019, referred self to ED for depression less than 24 hours after her roommate called 911 due to bizarre behavior.    Upon assessment, patient remains internally preoccupied and constricted, but has been compliant with medications.      #Schizophrenia  - continue with clozaril taper (175 qhs for tonight).   - Pt received prolixin dec 37.5mg [id number] IM injection yesterday 5/30/19; next dose due 6/13/19.    #Depressive symptoms, Insomnia  -c/w remeron 7.5mg PO QHS    #Chronic Medical Issues   - c/w home medications

## 2019-06-03 NOTE — CHART NOTE - NSCHARTNOTEFT_GEN_A_CORE
Pt. continues to present with bizarre behavior.  However, she has been engaging more with the treatment team.  No significant issues were reported over the weekend.  Pt. denies any suicidal or homicidal ideation or any A/V hallucinations.  Upon discharge, pt. will return to her apartment.  She will continue outpatient treatment at [facility].  Pt. is not psychiatrically stable for discharge at this time.

## 2019-06-03 NOTE — PROGRESS NOTE BEHAVIORAL HEALTH - NSBHFUPINTERVALHXFT_PSY_A_CORE
Patient seen and examined at bedside this morning, chart reviewed. Per nursing staff, no acute overnight events. Patient endorses that she is feeling hot due to her "nerves". Patient reports that she has been thinking a lot, but states that they are "good thoughts". Patient has no new complaints and has been compliant with medications, denies any adverse effects.   Denied A/V hallucination. Denies suicidal ideation.

## 2019-06-04 LAB
BASOPHILS # BLD AUTO: 0.06 K/UL — SIGNIFICANT CHANGE UP (ref 0–0.2)
BASOPHILS NFR BLD AUTO: 0.8 % — SIGNIFICANT CHANGE UP (ref 0–1)
EOSINOPHIL # BLD AUTO: 0.22 K/UL — SIGNIFICANT CHANGE UP (ref 0–0.7)
EOSINOPHIL NFR BLD AUTO: 3 % — SIGNIFICANT CHANGE UP (ref 0–8)
GLUCOSE BLDC GLUCOMTR-MCNC: 120 MG/DL — HIGH (ref 70–99)
GLUCOSE BLDC GLUCOMTR-MCNC: 212 MG/DL — HIGH (ref 70–99)
GLUCOSE BLDC GLUCOMTR-MCNC: 242 MG/DL — HIGH (ref 70–99)
HCT VFR BLD CALC: 44.2 % — SIGNIFICANT CHANGE UP (ref 37–47)
HGB BLD-MCNC: 13.9 G/DL — SIGNIFICANT CHANGE UP (ref 12–16)
IMM GRANULOCYTES NFR BLD AUTO: 0.3 % — SIGNIFICANT CHANGE UP (ref 0.1–0.3)
LYMPHOCYTES # BLD AUTO: 2.29 K/UL — SIGNIFICANT CHANGE UP (ref 1.2–3.4)
LYMPHOCYTES # BLD AUTO: 31 % — SIGNIFICANT CHANGE UP (ref 20.5–51.1)
MCHC RBC-ENTMCNC: 27.8 PG — SIGNIFICANT CHANGE UP (ref 27–31)
MCHC RBC-ENTMCNC: 31.4 G/DL — LOW (ref 32–37)
MCV RBC AUTO: 88.4 FL — SIGNIFICANT CHANGE UP (ref 81–99)
MONOCYTES # BLD AUTO: 0.7 K/UL — HIGH (ref 0.1–0.6)
MONOCYTES NFR BLD AUTO: 9.5 % — HIGH (ref 1.7–9.3)
NEUTROPHILS # BLD AUTO: 4.1 K/UL — SIGNIFICANT CHANGE UP (ref 1.4–6.5)
NEUTROPHILS NFR BLD AUTO: 55.4 % — SIGNIFICANT CHANGE UP (ref 42.2–75.2)
NRBC # BLD: 0 /100 WBCS — SIGNIFICANT CHANGE UP (ref 0–0)
PLATELET # BLD AUTO: 317 K/UL — SIGNIFICANT CHANGE UP (ref 130–400)
RBC # BLD: 5 M/UL — SIGNIFICANT CHANGE UP (ref 4.2–5.4)
RBC # FLD: 13.8 % — SIGNIFICANT CHANGE UP (ref 11.5–14.5)
WBC # BLD: 7.39 K/UL — SIGNIFICANT CHANGE UP (ref 4.8–10.8)
WBC # FLD AUTO: 7.39 K/UL — SIGNIFICANT CHANGE UP (ref 4.8–10.8)

## 2019-06-04 PROCEDURE — 99231 SBSQ HOSP IP/OBS SF/LOW 25: CPT

## 2019-06-04 RX ADMIN — MIRTAZAPINE 7.5 MILLIGRAM(S): 45 TABLET, ORALLY DISINTEGRATING ORAL at 20:20

## 2019-06-04 RX ADMIN — PIOGLITAZONE HYDROCHLORIDE 30 MILLIGRAM(S): 15 TABLET ORAL at 08:24

## 2019-06-04 RX ADMIN — INSULIN GLARGINE 20 UNIT(S): 100 INJECTION, SOLUTION SUBCUTANEOUS at 20:27

## 2019-06-04 RX ADMIN — LOSARTAN POTASSIUM 50 MILLIGRAM(S): 100 TABLET, FILM COATED ORAL at 08:24

## 2019-06-04 RX ADMIN — CLOZAPINE 175 MILLIGRAM(S): 150 TABLET, ORALLY DISINTEGRATING ORAL at 20:20

## 2019-06-04 RX ADMIN — ATORVASTATIN CALCIUM 40 MILLIGRAM(S): 80 TABLET, FILM COATED ORAL at 20:20

## 2019-06-04 RX ADMIN — Medication 100 MILLIGRAM(S): at 08:24

## 2019-06-04 NOTE — CHART NOTE - NSCHARTNOTEFT_GEN_A_CORE
Writer spoke with pt.'s , Kianna from [facility].  Writer provided an update on pt.'s progress including medication changes.  Writer agreed to continue to provide updates on pt.'s progress.

## 2019-06-05 LAB
GLUCOSE BLDC GLUCOMTR-MCNC: 172 MG/DL — HIGH (ref 70–99)
GLUCOSE BLDC GLUCOMTR-MCNC: 217 MG/DL — HIGH (ref 70–99)
GLUCOSE BLDC GLUCOMTR-MCNC: 294 MG/DL — HIGH (ref 70–99)

## 2019-06-05 PROCEDURE — 99231 SBSQ HOSP IP/OBS SF/LOW 25: CPT

## 2019-06-05 RX ORDER — CLOZAPINE 150 MG/1
200 TABLET, ORALLY DISINTEGRATING ORAL AT BEDTIME
Refills: 0 | Status: DISCONTINUED | OUTPATIENT
Start: 2019-06-05 | End: 2019-06-06

## 2019-06-05 RX ADMIN — INSULIN GLARGINE 20 UNIT(S): 100 INJECTION, SOLUTION SUBCUTANEOUS at 20:16

## 2019-06-05 RX ADMIN — LOSARTAN POTASSIUM 50 MILLIGRAM(S): 100 TABLET, FILM COATED ORAL at 08:15

## 2019-06-05 RX ADMIN — Medication 100 MILLIGRAM(S): at 08:15

## 2019-06-05 RX ADMIN — PIOGLITAZONE HYDROCHLORIDE 30 MILLIGRAM(S): 15 TABLET ORAL at 08:15

## 2019-06-05 RX ADMIN — MIRTAZAPINE 7.5 MILLIGRAM(S): 45 TABLET, ORALLY DISINTEGRATING ORAL at 20:16

## 2019-06-05 RX ADMIN — ATORVASTATIN CALCIUM 40 MILLIGRAM(S): 80 TABLET, FILM COATED ORAL at 20:15

## 2019-06-05 RX ADMIN — CLOZAPINE 200 MILLIGRAM(S): 150 TABLET, ORALLY DISINTEGRATING ORAL at 20:15

## 2019-06-05 NOTE — PROGRESS NOTE BEHAVIORAL HEALTH - NSBHFUPINTERVALHXFT_PSY_A_CORE
Patient seen and examined at bedside this morning, chart reviewed. Per nursing staff, patient continues to be isolative and bizarre, no acute overnight events. On approach, patient appears internally preoccupied. Patient endorses feeling bored, but has spent time "reading the bible". She reports hearing voices from god. Otherwise, no new complaints and has been compliant with medications, denies any adverse effects. Behaviorally stable, eating and drinking well. Denies s/h ideation.

## 2019-06-05 NOTE — CHART NOTE - NSCHARTNOTEFT_GEN_A_CORE
Pt continues to present with symptoms of psychosis and auditory hallucinations.  She reports some of the hallucinations are God speaking to her and that some are "bad".  Pt. denies any suicidal or homicidal ideations.  She states she is much more comfortable remaining in her room.  Pt. is not psychiatrically stable for discharge at this time.

## 2019-06-05 NOTE — PROGRESS NOTE BEHAVIORAL HEALTH - SUMMARY
48 yo  female with long hx of schizophrenia, DMII and HTN, transferred from [facility] to independent living in Jan 2019, referred self to ED for depression less than 24 hours after her roommate called 911 due to bizarre behavior.    Upon assessment, patient remains internally preoccupied, isolative and constricted, but has been compliant with medications and behaviorally stable. Continue to monitor treatment response.      #Schizophrenia  - continue with Clozaril taper (200 qhs for tonight).   - Pt received Prolixin dec 37.5mg [id number] IM injection yesterday 5/30/19; next dose due 6/13/19.    #Depressive symptoms, Insomnia  -c/w Remeron 7.5mg PO QHS    #Chronic Medical Issues   - c/w home medications

## 2019-06-06 LAB
GLUCOSE BLDC GLUCOMTR-MCNC: 136 MG/DL — HIGH (ref 70–99)
GLUCOSE BLDC GLUCOMTR-MCNC: 204 MG/DL — HIGH (ref 70–99)
GLUCOSE BLDC GLUCOMTR-MCNC: 226 MG/DL — HIGH (ref 70–99)
GLUCOSE BLDC GLUCOMTR-MCNC: 264 MG/DL — HIGH (ref 70–99)

## 2019-06-06 PROCEDURE — 99231 SBSQ HOSP IP/OBS SF/LOW 25: CPT

## 2019-06-06 RX ORDER — INSULIN LISPRO 100/ML
3 VIAL (ML) SUBCUTANEOUS
Refills: 0 | Status: DISCONTINUED | OUTPATIENT
Start: 2019-06-06 | End: 2019-07-10

## 2019-06-06 RX ORDER — INSULIN GLARGINE 100 [IU]/ML
20 INJECTION, SOLUTION SUBCUTANEOUS AT BEDTIME
Refills: 0 | Status: DISCONTINUED | OUTPATIENT
Start: 2019-06-06 | End: 2019-06-11

## 2019-06-06 RX ORDER — DEXTROSE 50 % IN WATER 50 %
15 SYRINGE (ML) INTRAVENOUS ONCE
Refills: 0 | Status: DISCONTINUED | OUTPATIENT
Start: 2019-06-06 | End: 2019-07-10

## 2019-06-06 RX ORDER — SODIUM CHLORIDE 9 MG/ML
1000 INJECTION, SOLUTION INTRAVENOUS
Refills: 0 | Status: DISCONTINUED | OUTPATIENT
Start: 2019-06-06 | End: 2019-07-10

## 2019-06-06 RX ORDER — DEXTROSE 50 % IN WATER 50 %
12.5 SYRINGE (ML) INTRAVENOUS ONCE
Refills: 0 | Status: DISCONTINUED | OUTPATIENT
Start: 2019-06-06 | End: 2019-07-10

## 2019-06-06 RX ORDER — GLUCAGON INJECTION, SOLUTION 0.5 MG/.1ML
1 INJECTION, SOLUTION SUBCUTANEOUS ONCE
Refills: 0 | Status: DISCONTINUED | OUTPATIENT
Start: 2019-06-06 | End: 2019-07-10

## 2019-06-06 RX ORDER — DEXTROSE 50 % IN WATER 50 %
25 SYRINGE (ML) INTRAVENOUS ONCE
Refills: 0 | Status: DISCONTINUED | OUTPATIENT
Start: 2019-06-06 | End: 2019-07-10

## 2019-06-06 RX ORDER — CLOZAPINE 150 MG/1
225 TABLET, ORALLY DISINTEGRATING ORAL AT BEDTIME
Refills: 0 | Status: DISCONTINUED | OUTPATIENT
Start: 2019-06-06 | End: 2019-06-10

## 2019-06-06 RX ORDER — INSULIN LISPRO 100/ML
VIAL (ML) SUBCUTANEOUS
Refills: 0 | Status: DISCONTINUED | OUTPATIENT
Start: 2019-06-06 | End: 2019-07-10

## 2019-06-06 RX ADMIN — INSULIN GLARGINE 20 UNIT(S): 100 INJECTION, SOLUTION SUBCUTANEOUS at 20:11

## 2019-06-06 RX ADMIN — Medication 3 UNIT(S): at 17:57

## 2019-06-06 RX ADMIN — Medication 100 MILLIGRAM(S): at 08:03

## 2019-06-06 RX ADMIN — LOSARTAN POTASSIUM 50 MILLIGRAM(S): 100 TABLET, FILM COATED ORAL at 08:03

## 2019-06-06 RX ADMIN — CLOZAPINE 225 MILLIGRAM(S): 150 TABLET, ORALLY DISINTEGRATING ORAL at 20:10

## 2019-06-06 RX ADMIN — Medication 3: at 17:57

## 2019-06-06 RX ADMIN — PIOGLITAZONE HYDROCHLORIDE 30 MILLIGRAM(S): 15 TABLET ORAL at 08:03

## 2019-06-06 RX ADMIN — MIRTAZAPINE 7.5 MILLIGRAM(S): 45 TABLET, ORALLY DISINTEGRATING ORAL at 20:10

## 2019-06-06 RX ADMIN — ATORVASTATIN CALCIUM 40 MILLIGRAM(S): 80 TABLET, FILM COATED ORAL at 20:10

## 2019-06-06 NOTE — PROGRESS NOTE BEHAVIORAL HEALTH - SUMMARY
50 yo  female with long hx of schizophrenia, DMII and HTN, transferred from [facility] to independent living in Jan 2019, referred self to ED for depression less than 24 hours after her roommate called 911 due to bizarre behavior.    Upon assessment, patient remains internally preoccupied, isolative and constricted, but has been compliant with medications and behaviorally stable. Continue to monitor treatment response.      #Schizophrenia  - continue with Clozaril taper (200 qhs for tonight).   - Pt received Prolixin dec 37.5mg [id number] IM injection yesterday 5/30/19; next dose due 6/13/19.    #Depressive symptoms, Insomnia  -c/w Remeron 7.5mg PO QHS    #Chronic Medical Issues   - c/w home medications

## 2019-06-06 NOTE — PROGRESS NOTE BEHAVIORAL HEALTH - NSBHFUPINTERVALHXFT_PSY_A_CORE
Patient seen and examined at bedside this morning, chart reviewed. Per nursing staff, no acute overnight events. Patient isolative to room. She reports hearing voices from God, stating things like "God is always with you". Otherwise, no new complaints and has been compliant with medications, denies any adverse effects. Behaviorally stable, eating and drinking well. Denies s/h ideation.

## 2019-06-07 LAB
GLUCOSE BLDC GLUCOMTR-MCNC: 104 MG/DL — HIGH (ref 70–99)
GLUCOSE BLDC GLUCOMTR-MCNC: 155 MG/DL — HIGH (ref 70–99)
GLUCOSE BLDC GLUCOMTR-MCNC: 172 MG/DL — HIGH (ref 70–99)
GLUCOSE BLDC GLUCOMTR-MCNC: 223 MG/DL — HIGH (ref 70–99)
GLUCOSE BLDC GLUCOMTR-MCNC: 263 MG/DL — HIGH (ref 70–99)

## 2019-06-07 PROCEDURE — 99231 SBSQ HOSP IP/OBS SF/LOW 25: CPT

## 2019-06-07 RX ADMIN — MIRTAZAPINE 7.5 MILLIGRAM(S): 45 TABLET, ORALLY DISINTEGRATING ORAL at 20:35

## 2019-06-07 RX ADMIN — Medication 2: at 11:46

## 2019-06-07 RX ADMIN — CLOZAPINE 225 MILLIGRAM(S): 150 TABLET, ORALLY DISINTEGRATING ORAL at 20:35

## 2019-06-07 RX ADMIN — Medication 3: at 16:59

## 2019-06-07 RX ADMIN — Medication 3 UNIT(S): at 07:40

## 2019-06-07 RX ADMIN — ATORVASTATIN CALCIUM 40 MILLIGRAM(S): 80 TABLET, FILM COATED ORAL at 20:35

## 2019-06-07 RX ADMIN — Medication 3 UNIT(S): at 11:46

## 2019-06-07 RX ADMIN — Medication 3 UNIT(S): at 16:59

## 2019-06-07 RX ADMIN — Medication 1: at 07:39

## 2019-06-07 RX ADMIN — PIOGLITAZONE HYDROCHLORIDE 30 MILLIGRAM(S): 15 TABLET ORAL at 08:24

## 2019-06-07 RX ADMIN — Medication 100 MILLIGRAM(S): at 08:24

## 2019-06-07 RX ADMIN — LOSARTAN POTASSIUM 50 MILLIGRAM(S): 100 TABLET, FILM COATED ORAL at 12:06

## 2019-06-07 NOTE — PROGRESS NOTE BEHAVIORAL HEALTH - SUMMARY
48 yo  female with long hx of schizophrenia, DMII and HTN, [place] [facility] [place], referred self to ED for depression less than 24 hours after her roommate called 911 due to bizarre behavior.    Upon assessment, patient remains internally preoccupied, isolative and flat, but has been compliant with medications and behaviorally stable. Continue to monitor treatment response.      #Schizophrenia  - continue with Clozaril taper (225 through the weekend).   - Pt received Prolixin dec 37.5mg W2yazis IM injection yesterday 5/30/19; next dose due 6/13/19.    #Depressive symptoms, Insomnia  -c/w Remeron 7.5mg PO QHS    #Chronic Medical Issues   - c/w home medications

## 2019-06-08 LAB
GLUCOSE BLDC GLUCOMTR-MCNC: 139 MG/DL — HIGH (ref 70–99)
GLUCOSE BLDC GLUCOMTR-MCNC: 161 MG/DL — HIGH (ref 70–99)
GLUCOSE BLDC GLUCOMTR-MCNC: 272 MG/DL — HIGH (ref 70–99)
GLUCOSE BLDC GLUCOMTR-MCNC: 302 MG/DL — HIGH (ref 70–99)

## 2019-06-08 RX ADMIN — INSULIN GLARGINE 20 UNIT(S): 100 INJECTION, SOLUTION SUBCUTANEOUS at 00:01

## 2019-06-08 RX ADMIN — Medication 4: at 16:16

## 2019-06-08 RX ADMIN — Medication 3 UNIT(S): at 08:17

## 2019-06-08 RX ADMIN — Medication 1: at 11:27

## 2019-06-08 RX ADMIN — PIOGLITAZONE HYDROCHLORIDE 30 MILLIGRAM(S): 15 TABLET ORAL at 08:16

## 2019-06-08 RX ADMIN — Medication 3 UNIT(S): at 16:16

## 2019-06-08 RX ADMIN — ATORVASTATIN CALCIUM 40 MILLIGRAM(S): 80 TABLET, FILM COATED ORAL at 21:07

## 2019-06-08 RX ADMIN — CLOZAPINE 225 MILLIGRAM(S): 150 TABLET, ORALLY DISINTEGRATING ORAL at 21:07

## 2019-06-08 RX ADMIN — MIRTAZAPINE 7.5 MILLIGRAM(S): 45 TABLET, ORALLY DISINTEGRATING ORAL at 21:07

## 2019-06-08 RX ADMIN — Medication 3 UNIT(S): at 11:28

## 2019-06-08 RX ADMIN — INSULIN GLARGINE 20 UNIT(S): 100 INJECTION, SOLUTION SUBCUTANEOUS at 21:06

## 2019-06-08 RX ADMIN — Medication 100 MILLIGRAM(S): at 08:16

## 2019-06-08 RX ADMIN — LOSARTAN POTASSIUM 50 MILLIGRAM(S): 100 TABLET, FILM COATED ORAL at 08:16

## 2019-06-09 LAB
GLUCOSE BLDC GLUCOMTR-MCNC: 142 MG/DL — HIGH (ref 70–99)
GLUCOSE BLDC GLUCOMTR-MCNC: 208 MG/DL — HIGH (ref 70–99)
GLUCOSE BLDC GLUCOMTR-MCNC: 213 MG/DL — HIGH (ref 70–99)
GLUCOSE BLDC GLUCOMTR-MCNC: 361 MG/DL — HIGH (ref 70–99)

## 2019-06-09 RX ADMIN — LOSARTAN POTASSIUM 50 MILLIGRAM(S): 100 TABLET, FILM COATED ORAL at 08:10

## 2019-06-09 RX ADMIN — Medication 3 UNIT(S): at 16:32

## 2019-06-09 RX ADMIN — INSULIN GLARGINE 20 UNIT(S): 100 INJECTION, SOLUTION SUBCUTANEOUS at 20:24

## 2019-06-09 RX ADMIN — Medication 100 MILLIGRAM(S): at 08:08

## 2019-06-09 RX ADMIN — Medication 2: at 11:49

## 2019-06-09 RX ADMIN — ATORVASTATIN CALCIUM 40 MILLIGRAM(S): 80 TABLET, FILM COATED ORAL at 20:08

## 2019-06-09 RX ADMIN — Medication 3 UNIT(S): at 11:50

## 2019-06-09 RX ADMIN — Medication 2: at 16:33

## 2019-06-09 RX ADMIN — Medication 3 UNIT(S): at 07:29

## 2019-06-09 RX ADMIN — MIRTAZAPINE 7.5 MILLIGRAM(S): 45 TABLET, ORALLY DISINTEGRATING ORAL at 20:08

## 2019-06-09 RX ADMIN — PIOGLITAZONE HYDROCHLORIDE 30 MILLIGRAM(S): 15 TABLET ORAL at 08:08

## 2019-06-09 RX ADMIN — CLOZAPINE 225 MILLIGRAM(S): 150 TABLET, ORALLY DISINTEGRATING ORAL at 20:08

## 2019-06-10 LAB
GLUCOSE BLDC GLUCOMTR-MCNC: 166 MG/DL — HIGH (ref 70–99)
GLUCOSE BLDC GLUCOMTR-MCNC: 203 MG/DL — HIGH (ref 70–99)
GLUCOSE BLDC GLUCOMTR-MCNC: 213 MG/DL — HIGH (ref 70–99)
GLUCOSE BLDC GLUCOMTR-MCNC: 296 MG/DL — HIGH (ref 70–99)

## 2019-06-10 PROCEDURE — 99231 SBSQ HOSP IP/OBS SF/LOW 25: CPT

## 2019-06-10 RX ORDER — CLOZAPINE 150 MG/1
250 TABLET, ORALLY DISINTEGRATING ORAL AT BEDTIME
Refills: 0 | Status: DISCONTINUED | OUTPATIENT
Start: 2019-06-10 | End: 2019-06-14

## 2019-06-10 RX ADMIN — Medication 1: at 07:56

## 2019-06-10 RX ADMIN — Medication 2: at 16:15

## 2019-06-10 RX ADMIN — Medication 3 UNIT(S): at 12:03

## 2019-06-10 RX ADMIN — CLOZAPINE 200 MILLIGRAM(S): 150 TABLET, ORALLY DISINTEGRATING ORAL at 20:18

## 2019-06-10 RX ADMIN — Medication 100 MILLIGRAM(S): at 08:11

## 2019-06-10 RX ADMIN — ATORVASTATIN CALCIUM 40 MILLIGRAM(S): 80 TABLET, FILM COATED ORAL at 20:19

## 2019-06-10 RX ADMIN — INSULIN GLARGINE 20 UNIT(S): 100 INJECTION, SOLUTION SUBCUTANEOUS at 20:16

## 2019-06-10 RX ADMIN — Medication 3 UNIT(S): at 07:58

## 2019-06-10 RX ADMIN — PIOGLITAZONE HYDROCHLORIDE 30 MILLIGRAM(S): 15 TABLET ORAL at 08:11

## 2019-06-10 RX ADMIN — Medication 2: at 12:02

## 2019-06-10 RX ADMIN — MIRTAZAPINE 7.5 MILLIGRAM(S): 45 TABLET, ORALLY DISINTEGRATING ORAL at 20:17

## 2019-06-10 RX ADMIN — Medication 3 UNIT(S): at 16:15

## 2019-06-10 RX ADMIN — LOSARTAN POTASSIUM 50 MILLIGRAM(S): 100 TABLET, FILM COATED ORAL at 08:12

## 2019-06-10 NOTE — PROGRESS NOTE BEHAVIORAL HEALTH - SUMMARY
50 yo  female with long hx of schizophrenia, DMII and HTN, [place] [facility] [place] living in Jan 2019, referred self to ED for depression less than 24 hours after her roommate called 911 due to bizarre behavior.    Upon assessment, patient remains internally preoccupied, isolative and flat, but has been compliant with medications and behaviorally stable. Continue to monitor treatment response.      #Schizophrenia  - continue with Clozaril taper (250 qhs).   - Pt received Prolixin dec 37.5mg [id number] IM injection yesterday 5/30/19; next dose due 6/13/19.    #Depressive symptoms, Insomnia  -c/w Remeron 7.5mg PO QHS    #Chronic Medical Issues   - c/w home medications

## 2019-06-10 NOTE — PROGRESS NOTE BEHAVIORAL HEALTH - NSBHFUPINTERVALHXFT_PSY_A_CORE
Patient seen and examined at bedside this morning, chart reviewed. Per nursing staff, no acute overnight events. Patient bizarre, but more visible on unit and not a management issue. She reports hearing "funny jokes" told to her by "GOD" which are not distressing. No new complaints and has been compliant with medications, denies any adverse effects. Eating and drinking well. Denies s/h ideation.

## 2019-06-10 NOTE — CHART NOTE - NSCHARTNOTEFT_GEN_A_CORE
Pt. continue to present with bizarre behavior and auditory hallucinations.  She continues to remain isolative to her room.  Pt. denies any suicidal or homicidal ideations.  Upon discharge, pt. will return to her apartment.  She will continue outpatient treatment services at [facility].  Pt. is not psychiatrically stable for discharge at this time.

## 2019-06-11 DIAGNOSIS — I10 ESSENTIAL (PRIMARY) HYPERTENSION: ICD-10-CM

## 2019-06-11 LAB
BASOPHILS # BLD AUTO: 0.06 K/UL — SIGNIFICANT CHANGE UP (ref 0–0.2)
BASOPHILS NFR BLD AUTO: 0.9 % — SIGNIFICANT CHANGE UP (ref 0–1)
EOSINOPHIL # BLD AUTO: 0.24 K/UL — SIGNIFICANT CHANGE UP (ref 0–0.7)
EOSINOPHIL NFR BLD AUTO: 3.5 % — SIGNIFICANT CHANGE UP (ref 0–8)
GLUCOSE BLDC GLUCOMTR-MCNC: 139 MG/DL — HIGH (ref 70–99)
GLUCOSE BLDC GLUCOMTR-MCNC: 169 MG/DL — HIGH (ref 70–99)
GLUCOSE BLDC GLUCOMTR-MCNC: 182 MG/DL — HIGH (ref 70–99)
GLUCOSE BLDC GLUCOMTR-MCNC: 185 MG/DL — HIGH (ref 70–99)
HCT VFR BLD CALC: 39.5 % — SIGNIFICANT CHANGE UP (ref 37–47)
HGB BLD-MCNC: 12.6 G/DL — SIGNIFICANT CHANGE UP (ref 12–16)
IMM GRANULOCYTES NFR BLD AUTO: 0.3 % — SIGNIFICANT CHANGE UP (ref 0.1–0.3)
LYMPHOCYTES # BLD AUTO: 1.95 K/UL — SIGNIFICANT CHANGE UP (ref 1.2–3.4)
LYMPHOCYTES # BLD AUTO: 28.4 % — SIGNIFICANT CHANGE UP (ref 20.5–51.1)
MCHC RBC-ENTMCNC: 28.2 PG — SIGNIFICANT CHANGE UP (ref 27–31)
MCHC RBC-ENTMCNC: 31.9 G/DL — LOW (ref 32–37)
MCV RBC AUTO: 88.4 FL — SIGNIFICANT CHANGE UP (ref 81–99)
MONOCYTES # BLD AUTO: 0.57 K/UL — SIGNIFICANT CHANGE UP (ref 0.1–0.6)
MONOCYTES NFR BLD AUTO: 8.3 % — SIGNIFICANT CHANGE UP (ref 1.7–9.3)
NEUTROPHILS # BLD AUTO: 4.02 K/UL — SIGNIFICANT CHANGE UP (ref 1.4–6.5)
NEUTROPHILS NFR BLD AUTO: 58.6 % — SIGNIFICANT CHANGE UP (ref 42.2–75.2)
NRBC # BLD: 0 /100 WBCS — SIGNIFICANT CHANGE UP (ref 0–0)
PLATELET # BLD AUTO: 296 K/UL — SIGNIFICANT CHANGE UP (ref 130–400)
RBC # BLD: 4.47 M/UL — SIGNIFICANT CHANGE UP (ref 4.2–5.4)
RBC # FLD: 13.9 % — SIGNIFICANT CHANGE UP (ref 11.5–14.5)
WBC # BLD: 6.86 K/UL — SIGNIFICANT CHANGE UP (ref 4.8–10.8)
WBC # FLD AUTO: 6.86 K/UL — SIGNIFICANT CHANGE UP (ref 4.8–10.8)

## 2019-06-11 PROCEDURE — 99231 SBSQ HOSP IP/OBS SF/LOW 25: CPT

## 2019-06-11 RX ORDER — LOSARTAN POTASSIUM 100 MG/1
100 TABLET, FILM COATED ORAL DAILY
Refills: 0 | Status: DISCONTINUED | OUTPATIENT
Start: 2019-06-11 | End: 2019-06-27

## 2019-06-11 RX ORDER — INSULIN GLARGINE 100 [IU]/ML
25 INJECTION, SOLUTION SUBCUTANEOUS AT BEDTIME
Refills: 0 | Status: DISCONTINUED | OUTPATIENT
Start: 2019-06-11 | End: 2019-07-10

## 2019-06-11 RX ADMIN — Medication 100 MILLIGRAM(S): at 08:01

## 2019-06-11 RX ADMIN — INSULIN GLARGINE 25 UNIT(S): 100 INJECTION, SOLUTION SUBCUTANEOUS at 20:35

## 2019-06-11 RX ADMIN — CLOZAPINE 250 MILLIGRAM(S): 150 TABLET, ORALLY DISINTEGRATING ORAL at 20:36

## 2019-06-11 RX ADMIN — Medication 3 UNIT(S): at 07:25

## 2019-06-11 RX ADMIN — MIRTAZAPINE 7.5 MILLIGRAM(S): 45 TABLET, ORALLY DISINTEGRATING ORAL at 20:36

## 2019-06-11 RX ADMIN — ATORVASTATIN CALCIUM 40 MILLIGRAM(S): 80 TABLET, FILM COATED ORAL at 20:35

## 2019-06-11 RX ADMIN — Medication 3 UNIT(S): at 16:22

## 2019-06-11 RX ADMIN — LOSARTAN POTASSIUM 50 MILLIGRAM(S): 100 TABLET, FILM COATED ORAL at 08:01

## 2019-06-11 RX ADMIN — Medication 3 UNIT(S): at 11:19

## 2019-06-11 RX ADMIN — PIOGLITAZONE HYDROCHLORIDE 30 MILLIGRAM(S): 15 TABLET ORAL at 08:01

## 2019-06-11 RX ADMIN — Medication 1: at 16:21

## 2019-06-11 RX ADMIN — Medication 1: at 11:19

## 2019-06-11 NOTE — PROGRESS NOTE BEHAVIORAL HEALTH - SUMMARY
50 yo  female with long hx of schizophrenia, DMII and HTN, [facility] [place] [facility] in Jan 2019, referred self to ED for depression less than 24 hours after her roommate called 911 due to bizarre behavior.    Upon assessment, patient remains internally preoccupied, isolative and flat, but has been compliant with medications and behaviorally stable. Continue to monitor treatment response.      #Schizophrenia  - continue with Clozaril taper (250 qhs).   - Pt received Prolixin dec 37.5mg [id number] IM injection yesterday 5/30/19; next dose due 6/13/19.    #Depressive symptoms, Insomnia  -c/w Remeron 7.5mg PO QHS    #Chronic Medical Issues   - c/w home medications

## 2019-06-11 NOTE — PROGRESS NOTE BEHAVIORAL HEALTH - NSBHFUPINTERVALHXFT_PSY_A_CORE
Patient seen and examined at bedside this morning, chart reviewed. Per nursing staff, no acute overnight events. Patient bizarre and pacing, but much more visible on unit and not a management issue. She reports voices telling her "the Lord is always with you". She endorses missing her friends but when questioned if she feels ready to leave yet, replies "maybe". No new complaints and has been compliant with medications, denies any adverse effects. Eating and drinking well. Denies s/h ideation.

## 2019-06-12 ENCOUNTER — APPOINTMENT (OUTPATIENT)
Dept: OBGYN | Facility: CLINIC | Age: 49
End: 2019-06-12

## 2019-06-12 LAB
CLOZAPINE SERPL-MCNC: 97 NG/ML — LOW (ref 350–600)
CLOZAPINE SPEC-SCNC: 173 NG/ML — LOW
GLUCOSE BLDC GLUCOMTR-MCNC: 168 MG/DL — HIGH (ref 70–99)
GLUCOSE BLDC GLUCOMTR-MCNC: 190 MG/DL — HIGH (ref 70–99)
GLUCOSE BLDC GLUCOMTR-MCNC: 197 MG/DL — HIGH (ref 70–99)
GLUCOSE BLDC GLUCOMTR-MCNC: 241 MG/DL — HIGH (ref 70–99)
NORCLOZAPINE SERPL-MCNC: 76 NG/ML — SIGNIFICANT CHANGE UP

## 2019-06-12 PROCEDURE — 99231 SBSQ HOSP IP/OBS SF/LOW 25: CPT

## 2019-06-12 RX ADMIN — Medication 2: at 16:27

## 2019-06-12 RX ADMIN — PIOGLITAZONE HYDROCHLORIDE 30 MILLIGRAM(S): 15 TABLET ORAL at 08:39

## 2019-06-12 RX ADMIN — Medication 1: at 11:31

## 2019-06-12 RX ADMIN — Medication 100 MILLIGRAM(S): at 08:39

## 2019-06-12 RX ADMIN — LOSARTAN POTASSIUM 100 MILLIGRAM(S): 100 TABLET, FILM COATED ORAL at 08:39

## 2019-06-12 RX ADMIN — Medication 1: at 07:47

## 2019-06-12 RX ADMIN — INSULIN GLARGINE 25 UNIT(S): 100 INJECTION, SOLUTION SUBCUTANEOUS at 20:25

## 2019-06-12 RX ADMIN — Medication 3 UNIT(S): at 11:32

## 2019-06-12 RX ADMIN — Medication 3 UNIT(S): at 16:28

## 2019-06-12 RX ADMIN — MIRTAZAPINE 7.5 MILLIGRAM(S): 45 TABLET, ORALLY DISINTEGRATING ORAL at 20:11

## 2019-06-12 RX ADMIN — ATORVASTATIN CALCIUM 40 MILLIGRAM(S): 80 TABLET, FILM COATED ORAL at 20:11

## 2019-06-12 RX ADMIN — Medication 3 UNIT(S): at 07:44

## 2019-06-12 RX ADMIN — CLOZAPINE 250 MILLIGRAM(S): 150 TABLET, ORALLY DISINTEGRATING ORAL at 20:11

## 2019-06-13 LAB
GLUCOSE BLDC GLUCOMTR-MCNC: 126 MG/DL — HIGH (ref 70–99)
GLUCOSE BLDC GLUCOMTR-MCNC: 203 MG/DL — HIGH (ref 70–99)
GLUCOSE BLDC GLUCOMTR-MCNC: 215 MG/DL — HIGH (ref 70–99)
GLUCOSE BLDC GLUCOMTR-MCNC: 254 MG/DL — HIGH (ref 70–99)

## 2019-06-13 PROCEDURE — 99231 SBSQ HOSP IP/OBS SF/LOW 25: CPT

## 2019-06-13 RX ORDER — FLUPHENAZINE HYDROCHLORIDE 1 MG/1
37.5 TABLET, FILM COATED ORAL ONCE
Refills: 0 | Status: COMPLETED | OUTPATIENT
Start: 2019-06-13 | End: 2019-06-13

## 2019-06-13 RX ADMIN — Medication 2: at 17:11

## 2019-06-13 RX ADMIN — INSULIN GLARGINE 25 UNIT(S): 100 INJECTION, SOLUTION SUBCUTANEOUS at 20:57

## 2019-06-13 RX ADMIN — Medication 3 UNIT(S): at 07:27

## 2019-06-13 RX ADMIN — FLUPHENAZINE HYDROCHLORIDE 37.5 MILLIGRAM(S): 1 TABLET, FILM COATED ORAL at 10:57

## 2019-06-13 RX ADMIN — LOSARTAN POTASSIUM 100 MILLIGRAM(S): 100 TABLET, FILM COATED ORAL at 08:23

## 2019-06-13 RX ADMIN — PIOGLITAZONE HYDROCHLORIDE 30 MILLIGRAM(S): 15 TABLET ORAL at 08:23

## 2019-06-13 RX ADMIN — Medication 100 MILLIGRAM(S): at 08:23

## 2019-06-13 RX ADMIN — Medication 3 UNIT(S): at 10:59

## 2019-06-13 RX ADMIN — ATORVASTATIN CALCIUM 40 MILLIGRAM(S): 80 TABLET, FILM COATED ORAL at 20:46

## 2019-06-13 RX ADMIN — Medication 3 UNIT(S): at 17:11

## 2019-06-13 RX ADMIN — CLOZAPINE 250 MILLIGRAM(S): 150 TABLET, ORALLY DISINTEGRATING ORAL at 20:46

## 2019-06-13 RX ADMIN — MIRTAZAPINE 7.5 MILLIGRAM(S): 45 TABLET, ORALLY DISINTEGRATING ORAL at 20:46

## 2019-06-13 RX ADMIN — Medication 2: at 10:59

## 2019-06-13 NOTE — PROGRESS NOTE BEHAVIORAL HEALTH - NSBHFUPINTERVALHXFT_PSY_A_CORE
Patient seen and examined at bedside this morning, chart reviewed. Per nursing staff, no acute overnight events. Patient still bizarre but pleasant, visible on unit and not a management issue. Continues to endorse hearing God. No new complaints and has been compliant with medications, denies any adverse effects. Eating and drinking well. Denies s/h ideation.

## 2019-06-13 NOTE — PROGRESS NOTE BEHAVIORAL HEALTH - SUMMARY
50 yo  female with long hx of schizophrenia, DMII and HTN, [place] [facility] [place], referred self to ED for depression less than 24 hours after her roommate called 911 due to bizarre behavior.    Upon assessment, patient remains internally preoccupied, isolative and flat, but has been compliant with medications and behaviorally stable. Continue to monitor treatment response.      #Schizophrenia  - continue with Clozaril taper (250 qhs).   - Pt received Prolixin dec 37.5mg [id number] IM injection yesterday 5/30/19; next dose due 6/13/19.    #Depressive symptoms, Insomnia  -c/w Remeron 7.5mg PO QHS    #Chronic Medical Issues   - c/w home medications

## 2019-06-14 LAB
GLUCOSE BLDC GLUCOMTR-MCNC: 110 MG/DL — HIGH (ref 70–99)
GLUCOSE BLDC GLUCOMTR-MCNC: 194 MG/DL — HIGH (ref 70–99)
GLUCOSE BLDC GLUCOMTR-MCNC: 201 MG/DL — HIGH (ref 70–99)
GLUCOSE BLDC GLUCOMTR-MCNC: 231 MG/DL — HIGH (ref 70–99)

## 2019-06-14 PROCEDURE — 99231 SBSQ HOSP IP/OBS SF/LOW 25: CPT

## 2019-06-14 RX ORDER — CLOZAPINE 150 MG/1
275 TABLET, ORALLY DISINTEGRATING ORAL AT BEDTIME
Refills: 0 | Status: COMPLETED | OUTPATIENT
Start: 2019-06-14 | End: 2019-06-14

## 2019-06-14 RX ORDER — CLOZAPINE 150 MG/1
300 TABLET, ORALLY DISINTEGRATING ORAL AT BEDTIME
Refills: 0 | Status: DISCONTINUED | OUTPATIENT
Start: 2019-06-15 | End: 2019-06-18

## 2019-06-14 RX ADMIN — Medication 3 UNIT(S): at 07:39

## 2019-06-14 RX ADMIN — INSULIN GLARGINE 25 UNIT(S): 100 INJECTION, SOLUTION SUBCUTANEOUS at 20:23

## 2019-06-14 RX ADMIN — ATORVASTATIN CALCIUM 40 MILLIGRAM(S): 80 TABLET, FILM COATED ORAL at 20:20

## 2019-06-14 RX ADMIN — Medication 2: at 11:08

## 2019-06-14 RX ADMIN — CLOZAPINE 275 MILLIGRAM(S): 150 TABLET, ORALLY DISINTEGRATING ORAL at 20:20

## 2019-06-14 RX ADMIN — PIOGLITAZONE HYDROCHLORIDE 30 MILLIGRAM(S): 15 TABLET ORAL at 08:21

## 2019-06-14 RX ADMIN — Medication 2: at 16:38

## 2019-06-14 RX ADMIN — MIRTAZAPINE 7.5 MILLIGRAM(S): 45 TABLET, ORALLY DISINTEGRATING ORAL at 20:20

## 2019-06-14 RX ADMIN — Medication 100 MILLIGRAM(S): at 08:21

## 2019-06-14 RX ADMIN — LOSARTAN POTASSIUM 100 MILLIGRAM(S): 100 TABLET, FILM COATED ORAL at 08:21

## 2019-06-14 RX ADMIN — Medication 3 UNIT(S): at 16:39

## 2019-06-14 RX ADMIN — Medication 3 UNIT(S): at 11:09

## 2019-06-14 NOTE — PROGRESS NOTE BEHAVIORAL HEALTH - SUMMARY
50 yo  female with long hx of schizophrenia, DMII and HTN, [place] [facility] [place], referred self to ED for depression less than 24 hours after her roommate called 911 due to bizarre behavior.    Upon assessment, patient remains internally preoccupied, isolative and flat, but has been compliant with medications and behaviorally stable. Continue to monitor treatment response.      #Schizophrenia  - continue with Clozaril taper (275 qhs), 300 starting 6/15/19  - Pt received Prolixin dec 37.5mg [id number] IM injection yesterday 6/13/19, next due in 2 weeks 6/27/19    #Depressive symptoms, Insomnia  -c/w Remeron 7.5mg PO QHS    #Chronic Medical Issues   - c/w home medications

## 2019-06-14 NOTE — PROGRESS NOTE BEHAVIORAL HEALTH - NSBHFUPINTERVALHXFT_PSY_A_CORE
Patient seen and examined during team meeting this morning, chart reviewed. Per nursing staff, no acute overnight events. Patient still bizarre but pleasant, visible on unit and not a management issue. Continues to endorse hearing voices from "God" and "the bible". Patient becomes tearful during interview, stating she misses her niece. No new complaints and has been compliant with medications, denies any adverse effects. Eating and drinking well. Denies s/h ideation.

## 2019-06-14 NOTE — CHART NOTE - NSCHARTNOTEFT_GEN_A_CORE
Pt. participated in this morning's treatment team meeting.  She continues to reports auditory hallucinations which are Taoism in nature.  Pt. also became tearful when discussing that she misses her niece and that her niece has not come to visit her in the hospital. Additionally, pt. states she is not yet ready for discharge.  Pt. denies any suicidal or homicidal ideations at this time and continues to largely isolate to her room.  Pt. is not psychiatrically stable for discharge at this time.

## 2019-06-15 LAB
CLOZAPINE SERPL-MCNC: 184 NG/ML — LOW (ref 350–600)
CLOZAPINE SPEC-SCNC: 262 NG/ML — LOW
GLUCOSE BLDC GLUCOMTR-MCNC: 127 MG/DL — HIGH (ref 70–99)
GLUCOSE BLDC GLUCOMTR-MCNC: 203 MG/DL — HIGH (ref 70–99)
GLUCOSE BLDC GLUCOMTR-MCNC: 205 MG/DL — HIGH (ref 70–99)
GLUCOSE BLDC GLUCOMTR-MCNC: 266 MG/DL — HIGH (ref 70–99)
NORCLOZAPINE SERPL-MCNC: 78 NG/ML — SIGNIFICANT CHANGE UP

## 2019-06-15 RX ADMIN — Medication 3 UNIT(S): at 07:47

## 2019-06-15 RX ADMIN — PIOGLITAZONE HYDROCHLORIDE 30 MILLIGRAM(S): 15 TABLET ORAL at 08:04

## 2019-06-15 RX ADMIN — MIRTAZAPINE 7.5 MILLIGRAM(S): 45 TABLET, ORALLY DISINTEGRATING ORAL at 21:00

## 2019-06-15 RX ADMIN — LOSARTAN POTASSIUM 100 MILLIGRAM(S): 100 TABLET, FILM COATED ORAL at 08:04

## 2019-06-15 RX ADMIN — INSULIN GLARGINE 25 UNIT(S): 100 INJECTION, SOLUTION SUBCUTANEOUS at 21:00

## 2019-06-15 RX ADMIN — Medication 3: at 16:55

## 2019-06-15 RX ADMIN — Medication 3 UNIT(S): at 12:00

## 2019-06-15 RX ADMIN — ATORVASTATIN CALCIUM 40 MILLIGRAM(S): 80 TABLET, FILM COATED ORAL at 21:00

## 2019-06-15 RX ADMIN — Medication 100 MILLIGRAM(S): at 08:04

## 2019-06-15 RX ADMIN — CLOZAPINE 300 MILLIGRAM(S): 150 TABLET, ORALLY DISINTEGRATING ORAL at 21:00

## 2019-06-15 RX ADMIN — Medication 3 UNIT(S): at 16:56

## 2019-06-16 LAB
GLUCOSE BLDC GLUCOMTR-MCNC: 127 MG/DL — HIGH (ref 70–99)
GLUCOSE BLDC GLUCOMTR-MCNC: 160 MG/DL — HIGH (ref 70–99)
GLUCOSE BLDC GLUCOMTR-MCNC: 201 MG/DL — HIGH (ref 70–99)
GLUCOSE BLDC GLUCOMTR-MCNC: 206 MG/DL — HIGH (ref 70–99)

## 2019-06-16 RX ADMIN — INSULIN GLARGINE 25 UNIT(S): 100 INJECTION, SOLUTION SUBCUTANEOUS at 21:15

## 2019-06-16 RX ADMIN — Medication 2: at 16:33

## 2019-06-16 RX ADMIN — Medication 100 MILLIGRAM(S): at 08:12

## 2019-06-16 RX ADMIN — Medication 3 UNIT(S): at 07:24

## 2019-06-16 RX ADMIN — MIRTAZAPINE 7.5 MILLIGRAM(S): 45 TABLET, ORALLY DISINTEGRATING ORAL at 21:15

## 2019-06-16 RX ADMIN — Medication 3 UNIT(S): at 16:33

## 2019-06-16 RX ADMIN — PIOGLITAZONE HYDROCHLORIDE 30 MILLIGRAM(S): 15 TABLET ORAL at 08:12

## 2019-06-16 RX ADMIN — Medication 3 UNIT(S): at 11:39

## 2019-06-16 RX ADMIN — Medication 1: at 11:39

## 2019-06-16 RX ADMIN — LOSARTAN POTASSIUM 100 MILLIGRAM(S): 100 TABLET, FILM COATED ORAL at 08:12

## 2019-06-16 RX ADMIN — CLOZAPINE 300 MILLIGRAM(S): 150 TABLET, ORALLY DISINTEGRATING ORAL at 21:17

## 2019-06-16 RX ADMIN — ATORVASTATIN CALCIUM 40 MILLIGRAM(S): 80 TABLET, FILM COATED ORAL at 21:16

## 2019-06-17 LAB
GLUCOSE BLDC GLUCOMTR-MCNC: 149 MG/DL — HIGH (ref 70–99)
GLUCOSE BLDC GLUCOMTR-MCNC: 151 MG/DL — HIGH (ref 70–99)
GLUCOSE BLDC GLUCOMTR-MCNC: 176 MG/DL — HIGH (ref 70–99)
GLUCOSE BLDC GLUCOMTR-MCNC: 199 MG/DL — HIGH (ref 70–99)

## 2019-06-17 PROCEDURE — 99231 SBSQ HOSP IP/OBS SF/LOW 25: CPT

## 2019-06-17 RX ADMIN — CLOZAPINE 300 MILLIGRAM(S): 150 TABLET, ORALLY DISINTEGRATING ORAL at 20:06

## 2019-06-17 RX ADMIN — MIRTAZAPINE 7.5 MILLIGRAM(S): 45 TABLET, ORALLY DISINTEGRATING ORAL at 20:06

## 2019-06-17 RX ADMIN — Medication 1: at 16:26

## 2019-06-17 RX ADMIN — LOSARTAN POTASSIUM 100 MILLIGRAM(S): 100 TABLET, FILM COATED ORAL at 08:21

## 2019-06-17 RX ADMIN — INSULIN GLARGINE 25 UNIT(S): 100 INJECTION, SOLUTION SUBCUTANEOUS at 20:08

## 2019-06-17 RX ADMIN — Medication 3 UNIT(S): at 16:26

## 2019-06-17 RX ADMIN — Medication 100 MILLIGRAM(S): at 08:21

## 2019-06-17 RX ADMIN — Medication 1: at 07:37

## 2019-06-17 RX ADMIN — ATORVASTATIN CALCIUM 40 MILLIGRAM(S): 80 TABLET, FILM COATED ORAL at 20:06

## 2019-06-17 RX ADMIN — Medication 3 UNIT(S): at 07:38

## 2019-06-17 RX ADMIN — PIOGLITAZONE HYDROCHLORIDE 30 MILLIGRAM(S): 15 TABLET ORAL at 08:21

## 2019-06-17 RX ADMIN — Medication 3 UNIT(S): at 11:38

## 2019-06-17 NOTE — PROGRESS NOTE BEHAVIORAL HEALTH - SUMMARY
50 yo  female with long hx of schizophrenia, DMII and HTN, [place] [facility] [place], referred self to ED for depression less than 24 hours after her roommate called 911 due to bizarre behavior.    Upon assessment, patient remains internally preoccupied, isolative and flat, but has been compliant with medications and behaviorally stable. Continue to monitor treatment response.      #Schizophrenia  - continue with Clozaril taper (300 mg qhs)   - f/u CBC   - Pt received Prolixin dec 37.5mg [id number] IM injection yesterday 6/13/19, next due in 2 weeks 6/27/19    #Depressive symptoms, Insomnia  -c/w Remeron 7.5mg PO QHS    #Chronic Medical Issues   - c/w home medications

## 2019-06-17 NOTE — PROGRESS NOTE BEHAVIORAL HEALTH - NSBHFUPINTERVALHXFT_PSY_A_CORE
Patient seen and examined at bedside this morning, chart reviewed. Per nursing staff, no acute events over the weekend. Patient still bizarre but pleasant, visible on unit and not a management issue. Continues to endorse hearing voices from "God" and "Junior". States that she feels "restless" at times but that this has been improving. No new complaints and has been compliant with medications, denies any adverse effects. Eating and drinking well. Denies s/h ideation.

## 2019-06-17 NOTE — CHART NOTE - NSCHARTNOTEFT_GEN_A_CORE
Pt. continues to present with bizarre behavior and auditory hallucinations.  She reports her hallucinations are Mandaen in nature and that God has been speaking to her.  Pt. denies any suicidal or homicidal ideation.  She remains compliant with her medication.  Upon discharge, pt. will return to her residence at [facility].  Pt. is not psychiatrically stable for discharge at this time.

## 2019-06-18 LAB
GLUCOSE BLDC GLUCOMTR-MCNC: 117 MG/DL — HIGH (ref 70–99)
GLUCOSE BLDC GLUCOMTR-MCNC: 159 MG/DL — HIGH (ref 70–99)
GLUCOSE BLDC GLUCOMTR-MCNC: 175 MG/DL — HIGH (ref 70–99)
GLUCOSE BLDC GLUCOMTR-MCNC: 182 MG/DL — HIGH (ref 70–99)

## 2019-06-18 PROCEDURE — 99231 SBSQ HOSP IP/OBS SF/LOW 25: CPT

## 2019-06-18 RX ORDER — CLOZAPINE 150 MG/1
325 TABLET, ORALLY DISINTEGRATING ORAL AT BEDTIME
Refills: 0 | Status: DISCONTINUED | OUTPATIENT
Start: 2019-06-18 | End: 2019-06-20

## 2019-06-18 RX ADMIN — Medication 3 UNIT(S): at 11:27

## 2019-06-18 RX ADMIN — Medication 1: at 16:46

## 2019-06-18 RX ADMIN — INSULIN GLARGINE 25 UNIT(S): 100 INJECTION, SOLUTION SUBCUTANEOUS at 20:24

## 2019-06-18 RX ADMIN — LOSARTAN POTASSIUM 100 MILLIGRAM(S): 100 TABLET, FILM COATED ORAL at 08:33

## 2019-06-18 RX ADMIN — Medication 3 UNIT(S): at 08:33

## 2019-06-18 RX ADMIN — MIRTAZAPINE 7.5 MILLIGRAM(S): 45 TABLET, ORALLY DISINTEGRATING ORAL at 20:23

## 2019-06-18 RX ADMIN — Medication 1: at 11:26

## 2019-06-18 RX ADMIN — ATORVASTATIN CALCIUM 40 MILLIGRAM(S): 80 TABLET, FILM COATED ORAL at 20:22

## 2019-06-18 RX ADMIN — Medication 3 UNIT(S): at 16:47

## 2019-06-18 RX ADMIN — Medication 100 MILLIGRAM(S): at 08:33

## 2019-06-18 RX ADMIN — PIOGLITAZONE HYDROCHLORIDE 30 MILLIGRAM(S): 15 TABLET ORAL at 08:33

## 2019-06-18 RX ADMIN — CLOZAPINE 325 MILLIGRAM(S): 150 TABLET, ORALLY DISINTEGRATING ORAL at 20:23

## 2019-06-18 NOTE — PROGRESS NOTE BEHAVIORAL HEALTH - NSBHFUPINTERVALHXFT_PSY_A_CORE
Patient seen and examined at bedside this morning, chart reviewed. Per nursing staff, no acute events overnight. Patient still bizarre but pleasant, visible on unit and not a management issue. Continues to endorse hearing voices from "God" and "angels". States she is trying to "relax" more. No new complaints and has been compliant with medications, denies any adverse effects. Eating and drinking well. Denies s/h ideation.

## 2019-06-18 NOTE — PROGRESS NOTE BEHAVIORAL HEALTH - SUMMARY
50 yo  female with long hx of schizophrenia, DMII and HTN, [place] [facility] [place], referred self to ED for depression less than 24 hours after her roommate called 911 due to bizarre behavior.    Upon assessment, patient remains internally preoccupied, endorses AH, is isolative and flat, but has been compliant with medications and behaviorally stable. Most recent Clozaril levels from 3/11 was 262. Patient requires further titration. Continue to monitor treatment response. Awaiting patient's  visit later this week.     #Schizophrenia  - continue with Clozaril taper (increase to 325 mg qhs)   - f/u CBC   - Pt received Prolixin dec 37.5mg [id number] IM injection yesterday 6/13/19, next due in 2 weeks 6/27/19    #Depressive symptoms, Insomnia  -c/w Remeron 7.5mg PO QHS    #Chronic Medical Issues   - c/w home medications

## 2019-06-19 LAB
BASOPHILS # BLD AUTO: 0.06 K/UL — SIGNIFICANT CHANGE UP (ref 0–0.2)
BASOPHILS NFR BLD AUTO: 0.7 % — SIGNIFICANT CHANGE UP (ref 0–1)
EOSINOPHIL # BLD AUTO: 0.22 K/UL — SIGNIFICANT CHANGE UP (ref 0–0.7)
EOSINOPHIL NFR BLD AUTO: 2.4 % — SIGNIFICANT CHANGE UP (ref 0–8)
GLUCOSE BLDC GLUCOMTR-MCNC: 141 MG/DL — HIGH (ref 70–99)
GLUCOSE BLDC GLUCOMTR-MCNC: 175 MG/DL — HIGH (ref 70–99)
GLUCOSE BLDC GLUCOMTR-MCNC: 202 MG/DL — HIGH (ref 70–99)
GLUCOSE BLDC GLUCOMTR-MCNC: 215 MG/DL — HIGH (ref 70–99)
HCT VFR BLD CALC: 40.8 % — SIGNIFICANT CHANGE UP (ref 37–47)
HGB BLD-MCNC: 12.9 G/DL — SIGNIFICANT CHANGE UP (ref 12–16)
IMM GRANULOCYTES NFR BLD AUTO: 0.2 % — SIGNIFICANT CHANGE UP (ref 0.1–0.3)
LYMPHOCYTES # BLD AUTO: 2 K/UL — SIGNIFICANT CHANGE UP (ref 1.2–3.4)
LYMPHOCYTES # BLD AUTO: 21.8 % — SIGNIFICANT CHANGE UP (ref 20.5–51.1)
MCHC RBC-ENTMCNC: 28.4 PG — SIGNIFICANT CHANGE UP (ref 27–31)
MCHC RBC-ENTMCNC: 31.6 G/DL — LOW (ref 32–37)
MCV RBC AUTO: 89.9 FL — SIGNIFICANT CHANGE UP (ref 81–99)
MONOCYTES # BLD AUTO: 0.5 K/UL — SIGNIFICANT CHANGE UP (ref 0.1–0.6)
MONOCYTES NFR BLD AUTO: 5.4 % — SIGNIFICANT CHANGE UP (ref 1.7–9.3)
NEUTROPHILS # BLD AUTO: 6.39 K/UL — SIGNIFICANT CHANGE UP (ref 1.4–6.5)
NEUTROPHILS NFR BLD AUTO: 69.5 % — SIGNIFICANT CHANGE UP (ref 42.2–75.2)
NRBC # BLD: 0 /100 WBCS — SIGNIFICANT CHANGE UP (ref 0–0)
PLATELET # BLD AUTO: 383 K/UL — SIGNIFICANT CHANGE UP (ref 130–400)
RBC # BLD: 4.54 M/UL — SIGNIFICANT CHANGE UP (ref 4.2–5.4)
RBC # FLD: 14.5 % — SIGNIFICANT CHANGE UP (ref 11.5–14.5)
WBC # BLD: 9.19 K/UL — SIGNIFICANT CHANGE UP (ref 4.8–10.8)
WBC # FLD AUTO: 9.19 K/UL — SIGNIFICANT CHANGE UP (ref 4.8–10.8)

## 2019-06-19 PROCEDURE — 99231 SBSQ HOSP IP/OBS SF/LOW 25: CPT

## 2019-06-19 RX ADMIN — Medication 1: at 16:24

## 2019-06-19 RX ADMIN — Medication 3: at 14:23

## 2019-06-19 RX ADMIN — CLOZAPINE 325 MILLIGRAM(S): 150 TABLET, ORALLY DISINTEGRATING ORAL at 20:15

## 2019-06-19 RX ADMIN — LOSARTAN POTASSIUM 100 MILLIGRAM(S): 100 TABLET, FILM COATED ORAL at 08:03

## 2019-06-19 RX ADMIN — Medication 3 UNIT(S): at 16:23

## 2019-06-19 RX ADMIN — MIRTAZAPINE 7.5 MILLIGRAM(S): 45 TABLET, ORALLY DISINTEGRATING ORAL at 20:16

## 2019-06-19 RX ADMIN — INSULIN GLARGINE 25 UNIT(S): 100 INJECTION, SOLUTION SUBCUTANEOUS at 20:15

## 2019-06-19 RX ADMIN — Medication 3 UNIT(S): at 14:25

## 2019-06-19 RX ADMIN — PIOGLITAZONE HYDROCHLORIDE 30 MILLIGRAM(S): 15 TABLET ORAL at 08:03

## 2019-06-19 RX ADMIN — Medication 100 MILLIGRAM(S): at 08:03

## 2019-06-19 RX ADMIN — Medication 3 UNIT(S): at 08:03

## 2019-06-19 RX ADMIN — ATORVASTATIN CALCIUM 40 MILLIGRAM(S): 80 TABLET, FILM COATED ORAL at 20:15

## 2019-06-19 NOTE — CHART NOTE - NSCHARTNOTEFT_GEN_A_CORE
Pt. continues to present with bizarre behavior.  She presented as tearful on this date and stated she was feeling sad, but refused to elaborate.  Pt. denies any suicidal or homicidal ideations.  She does continue to report auditory hallucinations.  Upon discharge, pt. will return home where she resides in an apartment through [facility].  She will continue outpatient mental health services through the [facility] clinic.  Pt. is not psychiatrically stable for discharge at this time.

## 2019-06-19 NOTE — PROGRESS NOTE BEHAVIORAL HEALTH - SUMMARY
48 yo  female with long hx of schizophrenia, DMII and HTN, [place] [facility] [place] living in Jan [place], referred self to ED for depression less than 24 hours after her roommate called 911 due to bizarre behavior.    Upon assessment, patient remains internally preoccupied, endorses AH, is isolative and flat, but has been compliant with medications and behaviorally stable. Most recent Clozaril levels from 3/11 was 262. Patient requires further titration. Continue to monitor treatment response. Awaiting patient's  visit later this week.     #Schizophrenia  - continue with Clozaril taper (increase to 325 mg qhs)   - CBC (6/19) is WNL; continue to monitor  - Pt received Prolixin dec 37.5mg R2rnopi IM injection yesterday 6/13/19, next due in 2 weeks 6/27/19    #Depressive symptoms, Insomnia  -c/w Remeron 7.5mg PO QHS    #Chronic Medical Issues   - c/w home medications

## 2019-06-19 NOTE — PROGRESS NOTE BEHAVIORAL HEALTH - NSBHFUPINTERVALHXFT_PSY_A_CORE
Patient seen and examined at bedside this morning, chart reviewed. Per nursing staff, no acute events overnight. Patient still bizarre but pleasant, visible on unit and not a management issue. On evaluation, pt was seen to be tearful, would not state what was wrong, simply stated that she felt "sad." Denied that any acute stressors occurred today. Continues to endorse hearing voices from "God" and "angels". States she is trying to "relax" more. No new complaints and has been compliant with medications, denies any adverse effects. Eating and drinking well. Denies s/h ideation.

## 2019-06-20 LAB
GLUCOSE BLDC GLUCOMTR-MCNC: 154 MG/DL — HIGH (ref 70–99)
GLUCOSE BLDC GLUCOMTR-MCNC: 163 MG/DL — HIGH (ref 70–99)
GLUCOSE BLDC GLUCOMTR-MCNC: 179 MG/DL — HIGH (ref 70–99)

## 2019-06-20 PROCEDURE — 99231 SBSQ HOSP IP/OBS SF/LOW 25: CPT

## 2019-06-20 RX ORDER — CLOZAPINE 150 MG/1
350 TABLET, ORALLY DISINTEGRATING ORAL AT BEDTIME
Refills: 0 | Status: DISCONTINUED | OUTPATIENT
Start: 2019-06-20 | End: 2019-06-21

## 2019-06-20 RX ADMIN — MIRTAZAPINE 7.5 MILLIGRAM(S): 45 TABLET, ORALLY DISINTEGRATING ORAL at 20:03

## 2019-06-20 RX ADMIN — Medication 1: at 07:55

## 2019-06-20 RX ADMIN — CLOZAPINE 350 MILLIGRAM(S): 150 TABLET, ORALLY DISINTEGRATING ORAL at 20:03

## 2019-06-20 RX ADMIN — PIOGLITAZONE HYDROCHLORIDE 30 MILLIGRAM(S): 15 TABLET ORAL at 08:08

## 2019-06-20 RX ADMIN — ATORVASTATIN CALCIUM 40 MILLIGRAM(S): 80 TABLET, FILM COATED ORAL at 20:03

## 2019-06-20 RX ADMIN — Medication 3 UNIT(S): at 16:40

## 2019-06-20 RX ADMIN — Medication 1: at 16:39

## 2019-06-20 RX ADMIN — Medication 3 UNIT(S): at 07:54

## 2019-06-20 RX ADMIN — LOSARTAN POTASSIUM 100 MILLIGRAM(S): 100 TABLET, FILM COATED ORAL at 08:08

## 2019-06-20 RX ADMIN — Medication 100 MILLIGRAM(S): at 08:08

## 2019-06-20 RX ADMIN — Medication 3 UNIT(S): at 11:42

## 2019-06-20 RX ADMIN — Medication 1: at 11:48

## 2019-06-20 RX ADMIN — INSULIN GLARGINE 25 UNIT(S): 100 INJECTION, SOLUTION SUBCUTANEOUS at 20:05

## 2019-06-20 NOTE — PROGRESS NOTE BEHAVIORAL HEALTH - NSBHFUPINTERVALHXFT_PSY_A_CORE
Patient seen and examined at bedside this morning, chart reviewed. Per nursing staff, no acute events overnight. Patient still bizarre but pleasant, visible on unit and not a management issue. Continues to endorse hearing voices from "God" "angles" and "Junior". No new complaints and has been compliant with medications, denies any adverse effects. Eating and drinking well. Denies s/h ideation.

## 2019-06-20 NOTE — PROGRESS NOTE BEHAVIORAL HEALTH - SUMMARY
48 yo  female with long hx of schizophrenia, DMII and HTN, [place] [facility] [place] [place] living in Jan 2019, referred self to ED for depression less than 24 hours after her roommate called 911 due to bizarre behavior.    Upon assessment, patient remains internally preoccupied, endorses AH, is isolative and flat, but has been compliant with medications and behaviorally stable. Most recent Clozaril levels from 3/11 was 262. Patient requires further titration. Continue to monitor treatment response. Awaiting patient's  visit later this week.     #Schizophrenia  - continue with Clozaril taper (increase to 350 mg qhs)   - Pt received Prolixin dec 37.5mg [id number] IM injection yesterday 6/13/19, next due in 2 weeks 6/27/19    #Depressive symptoms, Insomnia  -c/w Remeron 7.5mg PO QHS    #Chronic Medical Issues   - c/w home medications

## 2019-06-21 DIAGNOSIS — F25.8 OTHER SCHIZOAFFECTIVE DISORDERS: ICD-10-CM

## 2019-06-21 LAB
GLUCOSE BLDC GLUCOMTR-MCNC: 104 MG/DL — HIGH (ref 70–99)
GLUCOSE BLDC GLUCOMTR-MCNC: 117 MG/DL — HIGH (ref 70–99)
GLUCOSE BLDC GLUCOMTR-MCNC: 146 MG/DL — HIGH (ref 70–99)
GLUCOSE BLDC GLUCOMTR-MCNC: 195 MG/DL — HIGH (ref 70–99)

## 2019-06-21 PROCEDURE — 99231 SBSQ HOSP IP/OBS SF/LOW 25: CPT

## 2019-06-21 RX ORDER — CLOZAPINE 150 MG/1
375 TABLET, ORALLY DISINTEGRATING ORAL AT BEDTIME
Refills: 0 | Status: DISCONTINUED | OUTPATIENT
Start: 2019-06-21 | End: 2019-07-01

## 2019-06-21 RX ADMIN — ATORVASTATIN CALCIUM 40 MILLIGRAM(S): 80 TABLET, FILM COATED ORAL at 20:24

## 2019-06-21 RX ADMIN — CLOZAPINE 375 MILLIGRAM(S): 150 TABLET, ORALLY DISINTEGRATING ORAL at 20:25

## 2019-06-21 RX ADMIN — INSULIN GLARGINE 25 UNIT(S): 100 INJECTION, SOLUTION SUBCUTANEOUS at 20:26

## 2019-06-21 RX ADMIN — PIOGLITAZONE HYDROCHLORIDE 30 MILLIGRAM(S): 15 TABLET ORAL at 08:10

## 2019-06-21 RX ADMIN — Medication 100 MILLIGRAM(S): at 08:11

## 2019-06-21 RX ADMIN — LOSARTAN POTASSIUM 100 MILLIGRAM(S): 100 TABLET, FILM COATED ORAL at 08:11

## 2019-06-21 RX ADMIN — Medication 1: at 11:35

## 2019-06-21 RX ADMIN — MIRTAZAPINE 7.5 MILLIGRAM(S): 45 TABLET, ORALLY DISINTEGRATING ORAL at 20:24

## 2019-06-21 RX ADMIN — Medication 3 UNIT(S): at 07:20

## 2019-06-21 RX ADMIN — Medication 3 UNIT(S): at 16:33

## 2019-06-21 RX ADMIN — Medication 3 UNIT(S): at 11:35

## 2019-06-21 NOTE — CHART NOTE - NSCHARTNOTEFT_GEN_A_CORE
Pt. continues to present with bizarre behavior and auditory hallucinations.  Her auditory hallucinations are largely religiously based.  Pt. actively participated in today's treatment team meeting.  Writer spoke with pt.'s sister, Sangeeta Robertson, on 6/19.  Ms. Robertson verbalized concerns about pt. living independently, however she stated it was not an option for pt. to live with her at this time.  Writer explained that there are currently few alternatives.  Writer explained that an adult home would likely not accept pt. due to the severity of her mental health issues.  It would also be up to the housing program to place her back in less independent living.  Pt. denies any suicidal or homicidal ideations.  Pt. is not psychiatrically stable for discharge at this time.

## 2019-06-22 LAB
GLUCOSE BLDC GLUCOMTR-MCNC: 112 MG/DL — HIGH (ref 70–99)
GLUCOSE BLDC GLUCOMTR-MCNC: 160 MG/DL — HIGH (ref 70–99)
GLUCOSE BLDC GLUCOMTR-MCNC: 188 MG/DL — HIGH (ref 70–99)

## 2019-06-22 RX ADMIN — LOSARTAN POTASSIUM 100 MILLIGRAM(S): 100 TABLET, FILM COATED ORAL at 08:03

## 2019-06-22 RX ADMIN — Medication 3 UNIT(S): at 18:14

## 2019-06-22 RX ADMIN — Medication 3 UNIT(S): at 07:26

## 2019-06-22 RX ADMIN — Medication 3 UNIT(S): at 11:21

## 2019-06-22 RX ADMIN — CLOZAPINE 375 MILLIGRAM(S): 150 TABLET, ORALLY DISINTEGRATING ORAL at 20:42

## 2019-06-22 RX ADMIN — INSULIN GLARGINE 25 UNIT(S): 100 INJECTION, SOLUTION SUBCUTANEOUS at 21:38

## 2019-06-22 RX ADMIN — Medication 100 MILLIGRAM(S): at 08:03

## 2019-06-22 RX ADMIN — Medication 1: at 11:21

## 2019-06-22 RX ADMIN — ATORVASTATIN CALCIUM 40 MILLIGRAM(S): 80 TABLET, FILM COATED ORAL at 20:39

## 2019-06-22 RX ADMIN — PIOGLITAZONE HYDROCHLORIDE 30 MILLIGRAM(S): 15 TABLET ORAL at 08:03

## 2019-06-22 RX ADMIN — MIRTAZAPINE 7.5 MILLIGRAM(S): 45 TABLET, ORALLY DISINTEGRATING ORAL at 20:42

## 2019-06-23 LAB
GLUCOSE BLDC GLUCOMTR-MCNC: 136 MG/DL — HIGH (ref 70–99)
GLUCOSE BLDC GLUCOMTR-MCNC: 143 MG/DL — HIGH (ref 70–99)
GLUCOSE BLDC GLUCOMTR-MCNC: 152 MG/DL — HIGH (ref 70–99)
GLUCOSE BLDC GLUCOMTR-MCNC: 185 MG/DL — HIGH (ref 70–99)

## 2019-06-23 RX ADMIN — Medication 1: at 11:14

## 2019-06-23 RX ADMIN — ATORVASTATIN CALCIUM 40 MILLIGRAM(S): 80 TABLET, FILM COATED ORAL at 22:09

## 2019-06-23 RX ADMIN — Medication 3 UNIT(S): at 16:57

## 2019-06-23 RX ADMIN — LOSARTAN POTASSIUM 100 MILLIGRAM(S): 100 TABLET, FILM COATED ORAL at 08:00

## 2019-06-23 RX ADMIN — INSULIN GLARGINE 25 UNIT(S): 100 INJECTION, SOLUTION SUBCUTANEOUS at 22:06

## 2019-06-23 RX ADMIN — Medication 3 UNIT(S): at 07:20

## 2019-06-23 RX ADMIN — MIRTAZAPINE 7.5 MILLIGRAM(S): 45 TABLET, ORALLY DISINTEGRATING ORAL at 22:08

## 2019-06-23 RX ADMIN — PIOGLITAZONE HYDROCHLORIDE 30 MILLIGRAM(S): 15 TABLET ORAL at 08:00

## 2019-06-23 RX ADMIN — CLOZAPINE 375 MILLIGRAM(S): 150 TABLET, ORALLY DISINTEGRATING ORAL at 22:10

## 2019-06-23 RX ADMIN — Medication 100 MILLIGRAM(S): at 08:00

## 2019-06-23 RX ADMIN — Medication 3 UNIT(S): at 11:14

## 2019-06-24 LAB
GLUCOSE BLDC GLUCOMTR-MCNC: 112 MG/DL — HIGH (ref 70–99)
GLUCOSE BLDC GLUCOMTR-MCNC: 160 MG/DL — HIGH (ref 70–99)
GLUCOSE BLDC GLUCOMTR-MCNC: 171 MG/DL — HIGH (ref 70–99)
GLUCOSE BLDC GLUCOMTR-MCNC: 272 MG/DL — HIGH (ref 70–99)

## 2019-06-24 PROCEDURE — 99231 SBSQ HOSP IP/OBS SF/LOW 25: CPT

## 2019-06-24 RX ORDER — CLOZAPINE 150 MG/1
25 TABLET, ORALLY DISINTEGRATING ORAL
Refills: 0 | Status: DISCONTINUED | OUTPATIENT
Start: 2019-06-24 | End: 2019-06-25

## 2019-06-24 RX ADMIN — LOSARTAN POTASSIUM 100 MILLIGRAM(S): 100 TABLET, FILM COATED ORAL at 08:03

## 2019-06-24 RX ADMIN — MIRTAZAPINE 7.5 MILLIGRAM(S): 45 TABLET, ORALLY DISINTEGRATING ORAL at 20:17

## 2019-06-24 RX ADMIN — ATORVASTATIN CALCIUM 40 MILLIGRAM(S): 80 TABLET, FILM COATED ORAL at 20:17

## 2019-06-24 RX ADMIN — CLOZAPINE 25 MILLIGRAM(S): 150 TABLET, ORALLY DISINTEGRATING ORAL at 16:42

## 2019-06-24 RX ADMIN — Medication 3 UNIT(S): at 16:42

## 2019-06-24 RX ADMIN — Medication 3: at 16:41

## 2019-06-24 RX ADMIN — Medication 3 UNIT(S): at 07:23

## 2019-06-24 RX ADMIN — INSULIN GLARGINE 25 UNIT(S): 100 INJECTION, SOLUTION SUBCUTANEOUS at 21:09

## 2019-06-24 RX ADMIN — CLOZAPINE 375 MILLIGRAM(S): 150 TABLET, ORALLY DISINTEGRATING ORAL at 20:17

## 2019-06-24 RX ADMIN — Medication 3 UNIT(S): at 11:08

## 2019-06-24 RX ADMIN — Medication 1: at 11:07

## 2019-06-24 RX ADMIN — Medication 100 MILLIGRAM(S): at 08:02

## 2019-06-24 RX ADMIN — PIOGLITAZONE HYDROCHLORIDE 30 MILLIGRAM(S): 15 TABLET ORAL at 08:02

## 2019-06-24 NOTE — PROGRESS NOTE BEHAVIORAL HEALTH - SUMMARY
50 yo  female with long hx of schizophrenia, DMII and HTN, [place] [facility] [place], referred self to ED for depression less than 24 hours after her roommate called 911 due to bizarre behavior.    Upon assessment, patient remains internally preoccupied, endorses AH, is isolative and flat, but has been compliant with medications and behaviorally stable. Most recent Clozaril levels from 3/11 was 262. Patient requires further titration. Continue to monitor treatment response. Awaiting patient's  visit later this week.     #Schizophrenia  - continue with Clozaril taper (increase to 375 mg qhs)   - Pt received Prolixin dec 37.5mg [id number] IM injection yesterday 6/13/19, next due in 2 weeks 6/27/19    #Depressive symptoms, Insomnia  -c/w Remeron 7.5mg PO QHS    #Chronic Medical Issues   - c/w home medications

## 2019-06-24 NOTE — CHART NOTE - NSCHARTNOTEFT_GEN_A_CORE
Pt. continues to present with auditory hallucinations and bizarre behavior.  She denies any suicidal or homicidal ideations.  Pt. continues to remain isolative to her room where she states she feels safe.  Upon discharge, pt. will return to her apartment and will continue outpatient mental health services with the [facility] clinic.  Pt is not psychiatrically stable for discharge at this time.

## 2019-06-24 NOTE — PROGRESS NOTE BEHAVIORAL HEALTH - NSBHFUPINTERVALHXFT_PSY_A_CORE
Patient seen and examined at bedside this morning, chart reviewed. Per nursing staff, no acute events overnight. Patient still psychotic and bizarre at times, but pleasant, visible on unit and not a management issue. Continues to endorse hearing voices from "God" "angles" and "Junior". No new complaints and has been compliant with medications, denies any adverse effects. Eating and drinking well. Denies s/h ideation.

## 2019-06-25 LAB
GLUCOSE BLDC GLUCOMTR-MCNC: 122 MG/DL — HIGH (ref 70–99)
GLUCOSE BLDC GLUCOMTR-MCNC: 142 MG/DL — HIGH (ref 70–99)
GLUCOSE BLDC GLUCOMTR-MCNC: 172 MG/DL — HIGH (ref 70–99)
GLUCOSE BLDC GLUCOMTR-MCNC: 183 MG/DL — HIGH (ref 70–99)

## 2019-06-25 PROCEDURE — 99231 SBSQ HOSP IP/OBS SF/LOW 25: CPT

## 2019-06-25 RX ORDER — CLOZAPINE 150 MG/1
50 TABLET, ORALLY DISINTEGRATING ORAL
Refills: 0 | Status: DISCONTINUED | OUTPATIENT
Start: 2019-06-26 | End: 2019-06-27

## 2019-06-25 RX ADMIN — CLOZAPINE 25 MILLIGRAM(S): 150 TABLET, ORALLY DISINTEGRATING ORAL at 08:12

## 2019-06-25 RX ADMIN — Medication 100 MILLIGRAM(S): at 08:12

## 2019-06-25 RX ADMIN — Medication 3 UNIT(S): at 11:47

## 2019-06-25 RX ADMIN — ATORVASTATIN CALCIUM 40 MILLIGRAM(S): 80 TABLET, FILM COATED ORAL at 20:30

## 2019-06-25 RX ADMIN — PIOGLITAZONE HYDROCHLORIDE 30 MILLIGRAM(S): 15 TABLET ORAL at 08:11

## 2019-06-25 RX ADMIN — Medication 3 UNIT(S): at 16:51

## 2019-06-25 RX ADMIN — LOSARTAN POTASSIUM 100 MILLIGRAM(S): 100 TABLET, FILM COATED ORAL at 08:11

## 2019-06-25 RX ADMIN — Medication 1: at 11:48

## 2019-06-25 RX ADMIN — CLOZAPINE 375 MILLIGRAM(S): 150 TABLET, ORALLY DISINTEGRATING ORAL at 20:33

## 2019-06-25 RX ADMIN — INSULIN GLARGINE 25 UNIT(S): 100 INJECTION, SOLUTION SUBCUTANEOUS at 20:34

## 2019-06-25 RX ADMIN — MIRTAZAPINE 7.5 MILLIGRAM(S): 45 TABLET, ORALLY DISINTEGRATING ORAL at 20:35

## 2019-06-25 RX ADMIN — Medication 3 UNIT(S): at 07:55

## 2019-06-25 NOTE — PROGRESS NOTE BEHAVIORAL HEALTH - NSBHFUPINTERVALHXFT_PSY_A_CORE
Patient seen and examined at bedside this morning, chart reviewed. Per nursing staff, no acute events overnight. Patient still psychotic and bizarre at times, but pleasant, visible on unit and not a management issue. Continues to endorse biblical auditory hallucinations. No new complaints and has been compliant with medications, denies any adverse effects. Eating and drinking well. Denies s/h ideation.

## 2019-06-25 NOTE — PROGRESS NOTE BEHAVIORAL HEALTH - SUMMARY
48 yo  female with long hx of schizophrenia, DMII and HTN, [place] [facility] [place] [place], referred self to ED for depression less than 24 hours after her roommate called 911 due to bizarre behavior.    Upon assessment, patient remains internally preoccupied, endorses AH, is isolative and flat, but has been compliant with medications and behaviorally stable. Most recent Clozaril levels from 6/11 was 262. Patient requires further titration. Continue to monitor treatment response. Awaiting patient's  visit later this week. Anticipate dispo next week.     #Schizophrenia  - continue with Clozaril taper (25mg am, 375mg qhs)    - Pt received Prolixin dec 37.5mg D1lextc IM injection yesterday 6/13/19, next due in 2 weeks 6/27/19    #Depressive symptoms, Insomnia  -c/w Remeron 7.5mg PO QHS    #Chronic Medical Issues   - c/w home medications 48 yo  female with long hx of schizophrenia, DMII and HTN, [place] [facility] [place], referred self to ED for depression less than 24 hours after her roommate called 911 due to bizarre behavior.    Upon assessment, patient remains internally preoccupied, endorses AH, is isolative and flat, but has been compliant with medications and behaviorally stable. Most recent Clozaril levels from 6/11 was 262. Patient requires further titration. Continue to monitor treatment response. Awaiting patient's  visit later this week. Anticipate dispo next week.     #Schizophrenia  - continue with Clozaril taper (25mg am, 375mg qhs)    - Pt received Prolixin dec 37.5mg R8ucyoq IM injection yesterday 6/13/19, next due in 2 weeks 6/27/19    #Depressive symptoms, Insomnia  -c/w Remeron 7.5mg PO QHS  Ativan 1 mg PO or IM for severe anxiety    #Chronic Medical Issues   - c/w home medications

## 2019-06-26 LAB
BASOPHILS # BLD AUTO: 0.04 K/UL — SIGNIFICANT CHANGE UP (ref 0–0.2)
BASOPHILS NFR BLD AUTO: 0.5 % — SIGNIFICANT CHANGE UP (ref 0–1)
EOSINOPHIL # BLD AUTO: 0.27 K/UL — SIGNIFICANT CHANGE UP (ref 0–0.7)
EOSINOPHIL NFR BLD AUTO: 3.1 % — SIGNIFICANT CHANGE UP (ref 0–8)
GLUCOSE BLDC GLUCOMTR-MCNC: 115 MG/DL — HIGH (ref 70–99)
GLUCOSE BLDC GLUCOMTR-MCNC: 129 MG/DL — HIGH (ref 70–99)
GLUCOSE BLDC GLUCOMTR-MCNC: 266 MG/DL — HIGH (ref 70–99)
GLUCOSE BLDC GLUCOMTR-MCNC: 92 MG/DL — SIGNIFICANT CHANGE UP (ref 70–99)
HCT VFR BLD CALC: 40.6 % — SIGNIFICANT CHANGE UP (ref 37–47)
HGB BLD-MCNC: 12.7 G/DL — SIGNIFICANT CHANGE UP (ref 12–16)
IMM GRANULOCYTES NFR BLD AUTO: 0.2 % — SIGNIFICANT CHANGE UP (ref 0.1–0.3)
LYMPHOCYTES # BLD AUTO: 2.16 K/UL — SIGNIFICANT CHANGE UP (ref 1.2–3.4)
LYMPHOCYTES # BLD AUTO: 25 % — SIGNIFICANT CHANGE UP (ref 20.5–51.1)
MCHC RBC-ENTMCNC: 28.2 PG — SIGNIFICANT CHANGE UP (ref 27–31)
MCHC RBC-ENTMCNC: 31.3 G/DL — LOW (ref 32–37)
MCV RBC AUTO: 90.2 FL — SIGNIFICANT CHANGE UP (ref 81–99)
MONOCYTES # BLD AUTO: 0.53 K/UL — SIGNIFICANT CHANGE UP (ref 0.1–0.6)
MONOCYTES NFR BLD AUTO: 6.1 % — SIGNIFICANT CHANGE UP (ref 1.7–9.3)
NEUTROPHILS # BLD AUTO: 5.61 K/UL — SIGNIFICANT CHANGE UP (ref 1.4–6.5)
NEUTROPHILS NFR BLD AUTO: 65.1 % — SIGNIFICANT CHANGE UP (ref 42.2–75.2)
NRBC # BLD: 0 /100 WBCS — SIGNIFICANT CHANGE UP (ref 0–0)
PLATELET # BLD AUTO: 360 K/UL — SIGNIFICANT CHANGE UP (ref 130–400)
RBC # BLD: 4.5 M/UL — SIGNIFICANT CHANGE UP (ref 4.2–5.4)
RBC # FLD: 14.5 % — SIGNIFICANT CHANGE UP (ref 11.5–14.5)
WBC # BLD: 8.63 K/UL — SIGNIFICANT CHANGE UP (ref 4.8–10.8)
WBC # FLD AUTO: 8.63 K/UL — SIGNIFICANT CHANGE UP (ref 4.8–10.8)

## 2019-06-26 PROCEDURE — 99231 SBSQ HOSP IP/OBS SF/LOW 25: CPT

## 2019-06-26 RX ADMIN — Medication 3 UNIT(S): at 07:54

## 2019-06-26 RX ADMIN — Medication 3 UNIT(S): at 11:37

## 2019-06-26 RX ADMIN — Medication 100 MILLIGRAM(S): at 09:58

## 2019-06-26 RX ADMIN — CLOZAPINE 375 MILLIGRAM(S): 150 TABLET, ORALLY DISINTEGRATING ORAL at 20:07

## 2019-06-26 RX ADMIN — Medication 3 UNIT(S): at 17:08

## 2019-06-26 RX ADMIN — CLOZAPINE 50 MILLIGRAM(S): 150 TABLET, ORALLY DISINTEGRATING ORAL at 10:04

## 2019-06-26 RX ADMIN — PIOGLITAZONE HYDROCHLORIDE 30 MILLIGRAM(S): 15 TABLET ORAL at 09:58

## 2019-06-26 RX ADMIN — LOSARTAN POTASSIUM 100 MILLIGRAM(S): 100 TABLET, FILM COATED ORAL at 09:58

## 2019-06-26 RX ADMIN — INSULIN GLARGINE 25 UNIT(S): 100 INJECTION, SOLUTION SUBCUTANEOUS at 20:08

## 2019-06-26 RX ADMIN — Medication 3: at 11:37

## 2019-06-26 RX ADMIN — MIRTAZAPINE 7.5 MILLIGRAM(S): 45 TABLET, ORALLY DISINTEGRATING ORAL at 20:07

## 2019-06-26 RX ADMIN — ATORVASTATIN CALCIUM 40 MILLIGRAM(S): 80 TABLET, FILM COATED ORAL at 20:07

## 2019-06-26 NOTE — PROGRESS NOTE BEHAVIORAL HEALTH - SUMMARY
50 yo  female with long hx of schizophrenia, DMII and HTN, [place] [facility] [place] living [place], referred self to ED for depression less than 24 hours after her roommate called 911 due to bizarre behavior.    Upon assessment, patient remains internally preoccupied, endorses AH, is isolative and flat, but has been compliant with medications and behaviorally stable. Most recent Clozaril levels from 6/11 was 262. Patient requires further titration. Continue to monitor treatment response. Awaiting patient's  visit later this week. Anticipate dispo next week.     #Schizophrenia  - continue with Clozaril taper (25mg am, 375mg qhs)    - Pt received Prolixin dec 37.5mg [id number] IM injection yesterday 6/13/19, next due in 2 weeks 6/27/19    #Depressive symptoms, Insomnia  -c/w Remeron 7.5mg PO QHS  Ativan 1 mg PO or IM for severe anxiety    #Chronic Medical Issues   - c/w home medications

## 2019-06-26 NOTE — CHART NOTE - NSCHARTNOTEFT_GEN_A_CORE
Pt continues to present with some bizarre behaviors and auditory hallucinations of Muslim nature.  However, pt. does appear to be improving.  She continues to remain isolative to her room.  Pt denies any suicidal or homicidal ideations or any A/V hallucinations.  Upon discharge, pt. will return to her apartment and will continue mental health services at the [facility] clinic.  Pt. is not yet stable for discharge at this time.

## 2019-06-26 NOTE — PROGRESS NOTE BEHAVIORAL HEALTH - NSBHFUPINTERVALHXFT_PSY_A_CORE
Patient seen and examined at bedside this morning, chart reviewed. Per nursing staff, no acute events overnight. Patient still psychotic and bizarre at times, but pleasant, visible on unit and not a management issue. Patient reports she doesn't like staying in bed and enjoys groups. Continues to endorse biblical auditory hallucinations. No new complaints and has been compliant with medications, denies any adverse effects. Eating and drinking well. Denies s/h ideation.

## 2019-06-27 LAB
GLUCOSE BLDC GLUCOMTR-MCNC: 114 MG/DL — HIGH (ref 70–99)
GLUCOSE BLDC GLUCOMTR-MCNC: 119 MG/DL — HIGH (ref 70–99)
GLUCOSE BLDC GLUCOMTR-MCNC: 179 MG/DL — HIGH (ref 70–99)
GLUCOSE BLDC GLUCOMTR-MCNC: 221 MG/DL — HIGH (ref 70–99)

## 2019-06-27 PROCEDURE — 99231 SBSQ HOSP IP/OBS SF/LOW 25: CPT

## 2019-06-27 RX ORDER — CLOZAPINE 150 MG/1
75 TABLET, ORALLY DISINTEGRATING ORAL
Refills: 0 | Status: DISCONTINUED | OUTPATIENT
Start: 2019-06-28 | End: 2019-06-28

## 2019-06-27 RX ORDER — FLUPHENAZINE HYDROCHLORIDE 1 MG/1
37.5 TABLET, FILM COATED ORAL ONCE
Refills: 0 | Status: COMPLETED | OUTPATIENT
Start: 2019-06-27 | End: 2019-06-28

## 2019-06-27 RX ORDER — LOSARTAN POTASSIUM 100 MG/1
50 TABLET, FILM COATED ORAL DAILY
Refills: 0 | Status: DISCONTINUED | OUTPATIENT
Start: 2019-06-27 | End: 2019-07-10

## 2019-06-27 RX ADMIN — CLOZAPINE 375 MILLIGRAM(S): 150 TABLET, ORALLY DISINTEGRATING ORAL at 20:33

## 2019-06-27 RX ADMIN — Medication 3 UNIT(S): at 08:04

## 2019-06-27 RX ADMIN — CLOZAPINE 50 MILLIGRAM(S): 150 TABLET, ORALLY DISINTEGRATING ORAL at 08:08

## 2019-06-27 RX ADMIN — PIOGLITAZONE HYDROCHLORIDE 30 MILLIGRAM(S): 15 TABLET ORAL at 08:08

## 2019-06-27 RX ADMIN — LOSARTAN POTASSIUM 100 MILLIGRAM(S): 100 TABLET, FILM COATED ORAL at 08:08

## 2019-06-27 RX ADMIN — INSULIN GLARGINE 25 UNIT(S): 100 INJECTION, SOLUTION SUBCUTANEOUS at 20:41

## 2019-06-27 RX ADMIN — Medication 1: at 11:31

## 2019-06-27 RX ADMIN — ATORVASTATIN CALCIUM 40 MILLIGRAM(S): 80 TABLET, FILM COATED ORAL at 20:35

## 2019-06-27 RX ADMIN — MIRTAZAPINE 7.5 MILLIGRAM(S): 45 TABLET, ORALLY DISINTEGRATING ORAL at 20:33

## 2019-06-27 RX ADMIN — Medication 100 MILLIGRAM(S): at 08:09

## 2019-06-27 RX ADMIN — Medication 3 UNIT(S): at 11:31

## 2019-06-27 NOTE — PROGRESS NOTE BEHAVIORAL HEALTH - NSBHFUPINTERVALHXFT_PSY_A_CORE
Patient seen and examined at bedside this morning, chart reviewed. Per nursing staff, no acute events overnight. Patient still psychotic and bizarre at times, but pleasant, visible on unit and not a management issue. Patient reports feeling sad about all her family members who passed away for the years. Endorses trying to stay active. Continues to endorse biblical auditory hallucinations. No new complaints and has been compliant with medications, denies any adverse effects. Eating and drinking well. Denies s/h ideation.

## 2019-06-28 LAB
GLUCOSE BLDC GLUCOMTR-MCNC: 100 MG/DL — HIGH (ref 70–99)
GLUCOSE BLDC GLUCOMTR-MCNC: 149 MG/DL — HIGH (ref 70–99)
GLUCOSE BLDC GLUCOMTR-MCNC: 199 MG/DL — HIGH (ref 70–99)
GLUCOSE BLDC GLUCOMTR-MCNC: 236 MG/DL — HIGH (ref 70–99)

## 2019-06-28 PROCEDURE — 99231 SBSQ HOSP IP/OBS SF/LOW 25: CPT

## 2019-06-28 RX ORDER — CLOZAPINE 150 MG/1
100 TABLET, ORALLY DISINTEGRATING ORAL
Refills: 0 | Status: DISCONTINUED | OUTPATIENT
Start: 2019-06-28 | End: 2019-07-10

## 2019-06-28 RX ADMIN — INSULIN GLARGINE 25 UNIT(S): 100 INJECTION, SOLUTION SUBCUTANEOUS at 20:10

## 2019-06-28 RX ADMIN — CLOZAPINE 375 MILLIGRAM(S): 150 TABLET, ORALLY DISINTEGRATING ORAL at 20:07

## 2019-06-28 RX ADMIN — Medication 1: at 11:50

## 2019-06-28 RX ADMIN — PIOGLITAZONE HYDROCHLORIDE 30 MILLIGRAM(S): 15 TABLET ORAL at 08:26

## 2019-06-28 RX ADMIN — Medication 3 UNIT(S): at 11:50

## 2019-06-28 RX ADMIN — MIRTAZAPINE 7.5 MILLIGRAM(S): 45 TABLET, ORALLY DISINTEGRATING ORAL at 20:07

## 2019-06-28 RX ADMIN — FLUPHENAZINE HYDROCHLORIDE 37.5 MILLIGRAM(S): 1 TABLET, FILM COATED ORAL at 12:42

## 2019-06-28 RX ADMIN — ATORVASTATIN CALCIUM 40 MILLIGRAM(S): 80 TABLET, FILM COATED ORAL at 20:07

## 2019-06-28 RX ADMIN — Medication 2: at 16:39

## 2019-06-28 RX ADMIN — Medication 3 UNIT(S): at 07:38

## 2019-06-28 RX ADMIN — Medication 100 MILLIGRAM(S): at 08:26

## 2019-06-28 RX ADMIN — CLOZAPINE 75 MILLIGRAM(S): 150 TABLET, ORALLY DISINTEGRATING ORAL at 08:26

## 2019-06-28 RX ADMIN — Medication 3 UNIT(S): at 16:40

## 2019-06-28 NOTE — PROGRESS NOTE BEHAVIORAL HEALTH - NSBHFUPINTERVALHXFT_PSY_A_CORE
Patient seen and examined during team meeting this morning, chart reviewed. Per nursing staff, no acute events overnight. Patient still psychotic and bizarre at times, but pleasant, visible on unit and not a management issue. Patient looking forward to returning back to her apartment soon and to continue taking "math classes". Endorses trying to stay active. Continues to endorse biblical auditory hallucinations. No new complaints and has been compliant with medications, denies any adverse effects. Eating and drinking well. Denies s/h ideation.

## 2019-06-28 NOTE — PROGRESS NOTE BEHAVIORAL HEALTH - SUMMARY
48 yo  female with long hx of schizophrenia, DMII and HTN, [place] [facility] [place] living [place], referred self to ED for depression less than 24 hours after her roommate called 911 due to bizarre behavior.    Upon assessment, patient remains internally preoccupied, endorses AH, is isolative and flat, but has been compliant with medications and behaviorally stable. Most recent Clozaril levels from 6/11 was 262. Patient requires further titration. Continue to monitor treatment response. Awaiting patient's  visit later this week. Anticipate dispo next week.     #Schizophrenia  - continue with Clozaril taper (100 mg am, 375 mg qhs)    - Pt received Prolixin dec 37.5mg D3rgzsf IM injection yesterday 6/13/19, next due in 2 weeks 6/27/19    #Depressive symptoms, Insomnia  -c/w Remeron 7.5mg PO QHS  Ativan 1 mg PO or IM for severe anxiety    #Chronic Medical Issues   - c/w home medications

## 2019-06-28 NOTE — CHART NOTE - NSCHARTNOTEFT_GEN_A_CORE
Pt. continues to report auditory hallucinations, but to a lesser severity.  She denies any suicidal or homicidal ideation.  Pt. continues to isolate to her room.  She actively participated in this morning's treatment team meeting.  Upon discharge, pt. will return to her apartment and will continue mental health services with the [facility] clinic.  Pt. is not yet stable for discharge at this time.

## 2019-06-29 LAB
GLUCOSE BLDC GLUCOMTR-MCNC: 100 MG/DL — HIGH (ref 70–99)
GLUCOSE BLDC GLUCOMTR-MCNC: 131 MG/DL — HIGH (ref 70–99)
GLUCOSE BLDC GLUCOMTR-MCNC: 173 MG/DL — HIGH (ref 70–99)
GLUCOSE BLDC GLUCOMTR-MCNC: 201 MG/DL — HIGH (ref 70–99)

## 2019-06-29 RX ADMIN — MIRTAZAPINE 7.5 MILLIGRAM(S): 45 TABLET, ORALLY DISINTEGRATING ORAL at 20:05

## 2019-06-29 RX ADMIN — ATORVASTATIN CALCIUM 40 MILLIGRAM(S): 80 TABLET, FILM COATED ORAL at 20:05

## 2019-06-29 RX ADMIN — CLOZAPINE 100 MILLIGRAM(S): 150 TABLET, ORALLY DISINTEGRATING ORAL at 08:16

## 2019-06-29 RX ADMIN — PIOGLITAZONE HYDROCHLORIDE 30 MILLIGRAM(S): 15 TABLET ORAL at 08:17

## 2019-06-29 RX ADMIN — Medication 100 MILLIGRAM(S): at 08:17

## 2019-06-29 RX ADMIN — CLOZAPINE 375 MILLIGRAM(S): 150 TABLET, ORALLY DISINTEGRATING ORAL at 20:05

## 2019-06-29 RX ADMIN — Medication 3 UNIT(S): at 07:32

## 2019-06-29 RX ADMIN — Medication 3 UNIT(S): at 16:48

## 2019-06-29 RX ADMIN — Medication 3 UNIT(S): at 11:40

## 2019-06-29 RX ADMIN — LOSARTAN POTASSIUM 50 MILLIGRAM(S): 100 TABLET, FILM COATED ORAL at 08:15

## 2019-06-29 RX ADMIN — Medication 1: at 16:47

## 2019-06-29 RX ADMIN — INSULIN GLARGINE 25 UNIT(S): 100 INJECTION, SOLUTION SUBCUTANEOUS at 20:27

## 2019-06-30 LAB
GLUCOSE BLDC GLUCOMTR-MCNC: 116 MG/DL — HIGH (ref 70–99)
GLUCOSE BLDC GLUCOMTR-MCNC: 141 MG/DL — HIGH (ref 70–99)
GLUCOSE BLDC GLUCOMTR-MCNC: 237 MG/DL — HIGH (ref 70–99)
GLUCOSE BLDC GLUCOMTR-MCNC: 250 MG/DL — HIGH (ref 70–99)

## 2019-06-30 RX ADMIN — Medication 3 UNIT(S): at 07:24

## 2019-06-30 RX ADMIN — Medication 100 MILLIGRAM(S): at 08:03

## 2019-06-30 RX ADMIN — CLOZAPINE 375 MILLIGRAM(S): 150 TABLET, ORALLY DISINTEGRATING ORAL at 20:10

## 2019-06-30 RX ADMIN — PIOGLITAZONE HYDROCHLORIDE 30 MILLIGRAM(S): 15 TABLET ORAL at 08:03

## 2019-06-30 RX ADMIN — CLOZAPINE 100 MILLIGRAM(S): 150 TABLET, ORALLY DISINTEGRATING ORAL at 08:03

## 2019-06-30 RX ADMIN — INSULIN GLARGINE 25 UNIT(S): 100 INJECTION, SOLUTION SUBCUTANEOUS at 20:10

## 2019-06-30 RX ADMIN — Medication 3 UNIT(S): at 11:30

## 2019-06-30 RX ADMIN — Medication 2: at 11:30

## 2019-06-30 RX ADMIN — LOSARTAN POTASSIUM 50 MILLIGRAM(S): 100 TABLET, FILM COATED ORAL at 08:03

## 2019-06-30 RX ADMIN — Medication 3 UNIT(S): at 16:39

## 2019-06-30 RX ADMIN — ATORVASTATIN CALCIUM 40 MILLIGRAM(S): 80 TABLET, FILM COATED ORAL at 20:09

## 2019-06-30 RX ADMIN — MIRTAZAPINE 7.5 MILLIGRAM(S): 45 TABLET, ORALLY DISINTEGRATING ORAL at 20:10

## 2019-07-01 LAB
GLUCOSE BLDC GLUCOMTR-MCNC: 129 MG/DL — HIGH (ref 70–99)
GLUCOSE BLDC GLUCOMTR-MCNC: 159 MG/DL — HIGH (ref 70–99)
GLUCOSE BLDC GLUCOMTR-MCNC: 181 MG/DL — HIGH (ref 70–99)
GLUCOSE BLDC GLUCOMTR-MCNC: 191 MG/DL — HIGH (ref 70–99)

## 2019-07-01 PROCEDURE — 99231 SBSQ HOSP IP/OBS SF/LOW 25: CPT

## 2019-07-01 RX ORDER — CLOZAPINE 150 MG/1
400 TABLET, ORALLY DISINTEGRATING ORAL AT BEDTIME
Refills: 0 | Status: DISCONTINUED | OUTPATIENT
Start: 2019-07-01 | End: 2019-07-10

## 2019-07-01 RX ADMIN — Medication 100 MILLIGRAM(S): at 08:11

## 2019-07-01 RX ADMIN — Medication 1: at 11:31

## 2019-07-01 RX ADMIN — LOSARTAN POTASSIUM 50 MILLIGRAM(S): 100 TABLET, FILM COATED ORAL at 08:11

## 2019-07-01 RX ADMIN — ATORVASTATIN CALCIUM 40 MILLIGRAM(S): 80 TABLET, FILM COATED ORAL at 20:03

## 2019-07-01 RX ADMIN — CLOZAPINE 400 MILLIGRAM(S): 150 TABLET, ORALLY DISINTEGRATING ORAL at 20:03

## 2019-07-01 RX ADMIN — Medication 1: at 16:13

## 2019-07-01 RX ADMIN — MIRTAZAPINE 7.5 MILLIGRAM(S): 45 TABLET, ORALLY DISINTEGRATING ORAL at 20:04

## 2019-07-01 RX ADMIN — INSULIN GLARGINE 25 UNIT(S): 100 INJECTION, SOLUTION SUBCUTANEOUS at 20:03

## 2019-07-01 RX ADMIN — Medication 3 UNIT(S): at 11:30

## 2019-07-01 RX ADMIN — CLOZAPINE 100 MILLIGRAM(S): 150 TABLET, ORALLY DISINTEGRATING ORAL at 08:11

## 2019-07-01 RX ADMIN — Medication 3 UNIT(S): at 16:12

## 2019-07-01 RX ADMIN — PIOGLITAZONE HYDROCHLORIDE 30 MILLIGRAM(S): 15 TABLET ORAL at 08:11

## 2019-07-01 RX ADMIN — Medication 3 UNIT(S): at 08:09

## 2019-07-01 NOTE — PROGRESS NOTE BEHAVIORAL HEALTH - SUMMARY
50 yo  female with long hx of schizophrenia, DMII and HTN, transferred from [facility] living in Jan 2019, referred self to ED for depression less than 24 hours after her roommate called 911 due to bizarre behavior.    Upon assessment, patient remains internally preoccupied, endorses AH, is isolative and flat, but has been compliant with medications and behaviorally stable. Most recent Clozaril levels from 6/11 was 262. Patient requires further titration. Continue to monitor treatment response. Will need to determine safe disposition plan.     #Schizophrenia  - continue with Clozaril taper (100 mg am, 400 mg qhs)    - Pt received Prolixin dec 37.5mg [id number] IM injection given on 6/28/19, next due on 7/12/19    #Depressive symptoms, Insomnia  -c/w Remeron 7.5mg PO QHS  Ativan 1 mg PO or IM for severe anxiety    #Chronic Medical Issues   - c/w home medications

## 2019-07-01 NOTE — PROGRESS NOTE BEHAVIORAL HEALTH - NSBHFUPINTERVALHXFT_PSY_A_CORE
Patient seen and examined at bedside this morning, chart reviewed. Per nursing staff, no acute events overnight. Patient still psychotic and bizarre at times, but pleasant, visible on unit and not a management issue. Patient endorses getting restless at times and getting "tired of walking". Endorses trying to stay active. She reports that she would like to get back to her apartment soon. Continues to endorse biblical auditory hallucinations which are not distressing. No new complaints and has been compliant with medications, denies any adverse effects. Eating and drinking well. Denies s/h ideation.

## 2019-07-01 NOTE — CHART NOTE - NSCHARTNOTEFT_GEN_A_CORE
Pt. reports she is feeling better.  She states her auditory hallucinations only occur "a little bit" and are religiously based.  Pt. denies any suicidal or homicidal ideation.  Possible discharge for this week was discussed. Pt. will return to her apartment.  She will continue mental health services with [facility].  Pt. is not yet stable for discharge on this date.

## 2019-07-02 LAB
BASOPHILS # BLD AUTO: 0.04 K/UL — SIGNIFICANT CHANGE UP (ref 0–0.2)
BASOPHILS NFR BLD AUTO: 0.5 % — SIGNIFICANT CHANGE UP (ref 0–1)
EOSINOPHIL # BLD AUTO: 0.36 K/UL — SIGNIFICANT CHANGE UP (ref 0–0.7)
EOSINOPHIL NFR BLD AUTO: 4.2 % — SIGNIFICANT CHANGE UP (ref 0–8)
GLUCOSE BLDC GLUCOMTR-MCNC: 130 MG/DL — HIGH (ref 70–99)
GLUCOSE BLDC GLUCOMTR-MCNC: 138 MG/DL — HIGH (ref 70–99)
GLUCOSE BLDC GLUCOMTR-MCNC: 157 MG/DL — HIGH (ref 70–99)
GLUCOSE BLDC GLUCOMTR-MCNC: 179 MG/DL — HIGH (ref 70–99)
HCT VFR BLD CALC: 42.1 % — SIGNIFICANT CHANGE UP (ref 37–47)
HGB BLD-MCNC: 13.3 G/DL — SIGNIFICANT CHANGE UP (ref 12–16)
IMM GRANULOCYTES NFR BLD AUTO: 0.2 % — SIGNIFICANT CHANGE UP (ref 0.1–0.3)
LYMPHOCYTES # BLD AUTO: 2.16 K/UL — SIGNIFICANT CHANGE UP (ref 1.2–3.4)
LYMPHOCYTES # BLD AUTO: 25.2 % — SIGNIFICANT CHANGE UP (ref 20.5–51.1)
MCHC RBC-ENTMCNC: 28 PG — SIGNIFICANT CHANGE UP (ref 27–31)
MCHC RBC-ENTMCNC: 31.6 G/DL — LOW (ref 32–37)
MCV RBC AUTO: 88.6 FL — SIGNIFICANT CHANGE UP (ref 81–99)
MONOCYTES # BLD AUTO: 0.67 K/UL — HIGH (ref 0.1–0.6)
MONOCYTES NFR BLD AUTO: 7.8 % — SIGNIFICANT CHANGE UP (ref 1.7–9.3)
NEUTROPHILS # BLD AUTO: 5.31 K/UL — SIGNIFICANT CHANGE UP (ref 1.4–6.5)
NEUTROPHILS NFR BLD AUTO: 62.1 % — SIGNIFICANT CHANGE UP (ref 42.2–75.2)
NRBC # BLD: 0 /100 WBCS — SIGNIFICANT CHANGE UP (ref 0–0)
PLATELET # BLD AUTO: 367 K/UL — SIGNIFICANT CHANGE UP (ref 130–400)
RBC # BLD: 4.75 M/UL — SIGNIFICANT CHANGE UP (ref 4.2–5.4)
RBC # FLD: 14.2 % — SIGNIFICANT CHANGE UP (ref 11.5–14.5)
WBC # BLD: 8.56 K/UL — SIGNIFICANT CHANGE UP (ref 4.8–10.8)
WBC # FLD AUTO: 8.56 K/UL — SIGNIFICANT CHANGE UP (ref 4.8–10.8)

## 2019-07-02 PROCEDURE — 99231 SBSQ HOSP IP/OBS SF/LOW 25: CPT

## 2019-07-02 RX ADMIN — MIRTAZAPINE 7.5 MILLIGRAM(S): 45 TABLET, ORALLY DISINTEGRATING ORAL at 20:30

## 2019-07-02 RX ADMIN — CLOZAPINE 100 MILLIGRAM(S): 150 TABLET, ORALLY DISINTEGRATING ORAL at 07:42

## 2019-07-02 RX ADMIN — Medication 1: at 17:01

## 2019-07-02 RX ADMIN — PIOGLITAZONE HYDROCHLORIDE 30 MILLIGRAM(S): 15 TABLET ORAL at 08:18

## 2019-07-02 RX ADMIN — Medication 1: at 07:39

## 2019-07-02 RX ADMIN — INSULIN GLARGINE 25 UNIT(S): 100 INJECTION, SOLUTION SUBCUTANEOUS at 20:46

## 2019-07-02 RX ADMIN — Medication 3 UNIT(S): at 11:27

## 2019-07-02 RX ADMIN — Medication 3 UNIT(S): at 17:02

## 2019-07-02 RX ADMIN — Medication 100 MILLIGRAM(S): at 08:18

## 2019-07-02 RX ADMIN — Medication 3 UNIT(S): at 07:41

## 2019-07-02 RX ADMIN — CLOZAPINE 400 MILLIGRAM(S): 150 TABLET, ORALLY DISINTEGRATING ORAL at 20:30

## 2019-07-02 RX ADMIN — LOSARTAN POTASSIUM 50 MILLIGRAM(S): 100 TABLET, FILM COATED ORAL at 08:18

## 2019-07-02 RX ADMIN — ATORVASTATIN CALCIUM 40 MILLIGRAM(S): 80 TABLET, FILM COATED ORAL at 20:29

## 2019-07-02 NOTE — PROGRESS NOTE BEHAVIORAL HEALTH - NSBHFUPINTERVALHXFT_PSY_A_CORE
Patient seen and examined at bedside this morning, chart reviewed. Per nursing staff, no acute events overnight. Patient still psychotic and bizarre at times, but pleasant, visible on unit and not a management issue. Endorses trying to stay active and enjoys arts and crafts. Continues to endorse biblical auditory hallucinations which are not distressing. No new complaints and has been compliant with medications, tolerating increased doses. Eating and drinking well. Denies s/h ideation.

## 2019-07-02 NOTE — PROGRESS NOTE BEHAVIORAL HEALTH - SUMMARY
48 yo  female with long hx of schizophrenia, DMII and HTN, transferred from [facility] to independent living in Jan 2019, referred self to ED for depression less than 24 hours after her roommate called 911 due to bizarre behavior.    Upon assessment, patient remains internally preoccupied, endorses AH, is isolative and flat, but has been compliant with medications and behaviorally stable. Most recent Clozaril levels from 6/11 was 262. Patient requires further titration. Continue to monitor treatment response. Will need to determine safe disposition plan.     #Schizophrenia  - continue with Clozaril taper (100 mg am, 400 mg qhs)    - Pt received Prolixin dec 37.5mg [id number] IM injection given on 6/28/19, next due on 7/12/19  - check Clozaril levels     #Depressive symptoms, Insomnia  -c/w Remeron 7.5mg PO QHS  Ativan 1 mg PO or IM for severe anxiety    #Chronic Medical Issues   - c/w home medications

## 2019-07-03 LAB
GLUCOSE BLDC GLUCOMTR-MCNC: 109 MG/DL — HIGH (ref 70–99)
GLUCOSE BLDC GLUCOMTR-MCNC: 174 MG/DL — HIGH (ref 70–99)
GLUCOSE BLDC GLUCOMTR-MCNC: 182 MG/DL — HIGH (ref 70–99)
GLUCOSE BLDC GLUCOMTR-MCNC: 239 MG/DL — HIGH (ref 70–99)

## 2019-07-03 PROCEDURE — 99231 SBSQ HOSP IP/OBS SF/LOW 25: CPT

## 2019-07-03 RX ADMIN — Medication 3 UNIT(S): at 07:41

## 2019-07-03 RX ADMIN — Medication 3 UNIT(S): at 16:48

## 2019-07-03 RX ADMIN — INSULIN GLARGINE 25 UNIT(S): 100 INJECTION, SOLUTION SUBCUTANEOUS at 20:31

## 2019-07-03 RX ADMIN — MIRTAZAPINE 7.5 MILLIGRAM(S): 45 TABLET, ORALLY DISINTEGRATING ORAL at 20:30

## 2019-07-03 RX ADMIN — CLOZAPINE 100 MILLIGRAM(S): 150 TABLET, ORALLY DISINTEGRATING ORAL at 09:20

## 2019-07-03 RX ADMIN — PIOGLITAZONE HYDROCHLORIDE 30 MILLIGRAM(S): 15 TABLET ORAL at 09:20

## 2019-07-03 RX ADMIN — ATORVASTATIN CALCIUM 40 MILLIGRAM(S): 80 TABLET, FILM COATED ORAL at 20:31

## 2019-07-03 RX ADMIN — Medication 3 UNIT(S): at 12:04

## 2019-07-03 RX ADMIN — CLOZAPINE 400 MILLIGRAM(S): 150 TABLET, ORALLY DISINTEGRATING ORAL at 20:31

## 2019-07-03 RX ADMIN — Medication 2: at 12:03

## 2019-07-03 RX ADMIN — LOSARTAN POTASSIUM 50 MILLIGRAM(S): 100 TABLET, FILM COATED ORAL at 09:20

## 2019-07-03 RX ADMIN — Medication 100 MILLIGRAM(S): at 09:20

## 2019-07-03 RX ADMIN — Medication 1: at 16:48

## 2019-07-03 NOTE — CHART NOTE - NSCHARTNOTEFT_GEN_A_CORE
There is no apparent change in pt.'s mental status at this time.  She continues to report auditory hallucinations, however, they appears greatly diminished.  Pt. continues to deny any suicidal or homicidal ideations or any A/V hallucinations.  Upon discharge pt. will return to her shared apartment and will continue mental health services at the [facility] clinic.  Pt. is not yet stable for discharge on this date.

## 2019-07-04 LAB
GLUCOSE BLDC GLUCOMTR-MCNC: 133 MG/DL — HIGH (ref 70–99)
GLUCOSE BLDC GLUCOMTR-MCNC: 136 MG/DL — HIGH (ref 70–99)
GLUCOSE BLDC GLUCOMTR-MCNC: 198 MG/DL — HIGH (ref 70–99)
GLUCOSE BLDC GLUCOMTR-MCNC: 289 MG/DL — HIGH (ref 70–99)

## 2019-07-04 RX ADMIN — MIRTAZAPINE 7.5 MILLIGRAM(S): 45 TABLET, ORALLY DISINTEGRATING ORAL at 20:11

## 2019-07-04 RX ADMIN — ATORVASTATIN CALCIUM 40 MILLIGRAM(S): 80 TABLET, FILM COATED ORAL at 20:11

## 2019-07-04 RX ADMIN — Medication 3 UNIT(S): at 07:29

## 2019-07-04 RX ADMIN — Medication 3 UNIT(S): at 16:18

## 2019-07-04 RX ADMIN — CLOZAPINE 100 MILLIGRAM(S): 150 TABLET, ORALLY DISINTEGRATING ORAL at 08:23

## 2019-07-04 RX ADMIN — CLOZAPINE 400 MILLIGRAM(S): 150 TABLET, ORALLY DISINTEGRATING ORAL at 20:11

## 2019-07-04 RX ADMIN — INSULIN GLARGINE 25 UNIT(S): 100 INJECTION, SOLUTION SUBCUTANEOUS at 20:11

## 2019-07-04 RX ADMIN — PIOGLITAZONE HYDROCHLORIDE 30 MILLIGRAM(S): 15 TABLET ORAL at 08:23

## 2019-07-04 RX ADMIN — Medication 3 UNIT(S): at 11:47

## 2019-07-04 RX ADMIN — LOSARTAN POTASSIUM 50 MILLIGRAM(S): 100 TABLET, FILM COATED ORAL at 08:23

## 2019-07-04 RX ADMIN — Medication 100 MILLIGRAM(S): at 08:23

## 2019-07-04 RX ADMIN — Medication 3: at 16:18

## 2019-07-05 LAB
GLUCOSE BLDC GLUCOMTR-MCNC: 127 MG/DL — HIGH (ref 70–99)
GLUCOSE BLDC GLUCOMTR-MCNC: 232 MG/DL — HIGH (ref 70–99)
GLUCOSE BLDC GLUCOMTR-MCNC: 252 MG/DL — HIGH (ref 70–99)
GLUCOSE BLDC GLUCOMTR-MCNC: 273 MG/DL — HIGH (ref 70–99)

## 2019-07-05 PROCEDURE — 99231 SBSQ HOSP IP/OBS SF/LOW 25: CPT

## 2019-07-05 RX ADMIN — Medication 3 UNIT(S): at 17:06

## 2019-07-05 RX ADMIN — Medication 3: at 11:59

## 2019-07-05 RX ADMIN — Medication 100 MILLIGRAM(S): at 08:17

## 2019-07-05 RX ADMIN — LOSARTAN POTASSIUM 50 MILLIGRAM(S): 100 TABLET, FILM COATED ORAL at 08:17

## 2019-07-05 RX ADMIN — ATORVASTATIN CALCIUM 40 MILLIGRAM(S): 80 TABLET, FILM COATED ORAL at 20:28

## 2019-07-05 RX ADMIN — Medication 3 UNIT(S): at 11:59

## 2019-07-05 RX ADMIN — CLOZAPINE 100 MILLIGRAM(S): 150 TABLET, ORALLY DISINTEGRATING ORAL at 08:17

## 2019-07-05 RX ADMIN — INSULIN GLARGINE 25 UNIT(S): 100 INJECTION, SOLUTION SUBCUTANEOUS at 20:29

## 2019-07-05 RX ADMIN — Medication 2: at 17:05

## 2019-07-05 RX ADMIN — Medication 3 UNIT(S): at 07:51

## 2019-07-05 RX ADMIN — PIOGLITAZONE HYDROCHLORIDE 30 MILLIGRAM(S): 15 TABLET ORAL at 08:17

## 2019-07-05 RX ADMIN — MIRTAZAPINE 7.5 MILLIGRAM(S): 45 TABLET, ORALLY DISINTEGRATING ORAL at 20:28

## 2019-07-05 RX ADMIN — CLOZAPINE 400 MILLIGRAM(S): 150 TABLET, ORALLY DISINTEGRATING ORAL at 20:28

## 2019-07-05 NOTE — PROGRESS NOTE BEHAVIORAL HEALTH - NSBHFUPINTERVALHXFT_PSY_A_CORE
Patient seen and examined during team meeting this morning, chart reviewed. Per nursing staff, no acute events overnight. Patient still psychotic and bizarre at times, but pleasant, visible on unit and not a management issue. Endorses thinking about and missing her family and friends. Continues to endorse biblical auditory hallucinations which are not distressing. No new complaints and has been compliant with medications, tolerating increased doses. Eating and drinking well. Denies s/h ideation.

## 2019-07-05 NOTE — PROGRESS NOTE BEHAVIORAL HEALTH - SUMMARY
48 yo  female with long hx of schizophrenia, DMII and HTN, transferred from [facility] to independent living in Jan 2019, referred self to ED for depression less than 24 hours after her roommate called 911 due to bizarre behavior.    Upon assessment, patient remains internally preoccupied, endorses AH, is isolative and flat, but has been compliant with medications and behaviorally stable. Most recent Clozaril levels from 6/11 was 262. Patient requires further titration. Continue to monitor treatment response. Will need to determine safe disposition plan.     #Schizophrenia  - continue with Clozaril taper (100 mg am, 400 mg qhs)    - Pt received Prolixin dec 37.5mg [id number] IM injection given on 6/28/19, next due on 7/12/19  - check Clozaril levels     #Depressive symptoms, Insomnia  -c/w Remeron 7.5mg PO QHS  Ativan 1 mg PO or IM for severe anxiety    #Chronic Medical Issues   - c/w home medications

## 2019-07-05 NOTE — CHART NOTE - NSCHARTNOTEFT_GEN_A_CORE
Pt. actively participated in this morning's treatment team meeting.  She reports feeling much better and states she is missing her friends and family.  As per the attending physician, pt. will likely be discharged next week.  She will return to her shared apartment and will continue with mental health services at the [facility] clinic.  Pt. continues to deny any suicidal or homicidal ideation and remains isolative to her room.  Pt. is not yet ready for discharge on this date.

## 2019-07-06 LAB
GLUCOSE BLDC GLUCOMTR-MCNC: 121 MG/DL — HIGH (ref 70–99)
GLUCOSE BLDC GLUCOMTR-MCNC: 139 MG/DL — HIGH (ref 70–99)
GLUCOSE BLDC GLUCOMTR-MCNC: 145 MG/DL — HIGH (ref 70–99)
GLUCOSE BLDC GLUCOMTR-MCNC: 218 MG/DL — HIGH (ref 70–99)

## 2019-07-06 RX ADMIN — CLOZAPINE 400 MILLIGRAM(S): 150 TABLET, ORALLY DISINTEGRATING ORAL at 20:13

## 2019-07-06 RX ADMIN — LOSARTAN POTASSIUM 50 MILLIGRAM(S): 100 TABLET, FILM COATED ORAL at 08:02

## 2019-07-06 RX ADMIN — MIRTAZAPINE 7.5 MILLIGRAM(S): 45 TABLET, ORALLY DISINTEGRATING ORAL at 20:14

## 2019-07-06 RX ADMIN — Medication 3 UNIT(S): at 16:49

## 2019-07-06 RX ADMIN — CLOZAPINE 100 MILLIGRAM(S): 150 TABLET, ORALLY DISINTEGRATING ORAL at 08:02

## 2019-07-06 RX ADMIN — HALOPERIDOL DECANOATE 5 MILLIGRAM(S): 100 INJECTION INTRAMUSCULAR at 20:14

## 2019-07-06 RX ADMIN — Medication 3 UNIT(S): at 11:43

## 2019-07-06 RX ADMIN — INSULIN GLARGINE 25 UNIT(S): 100 INJECTION, SOLUTION SUBCUTANEOUS at 20:31

## 2019-07-06 RX ADMIN — PIOGLITAZONE HYDROCHLORIDE 30 MILLIGRAM(S): 15 TABLET ORAL at 08:02

## 2019-07-06 RX ADMIN — Medication 3 UNIT(S): at 07:51

## 2019-07-06 RX ADMIN — ATORVASTATIN CALCIUM 40 MILLIGRAM(S): 80 TABLET, FILM COATED ORAL at 20:13

## 2019-07-06 RX ADMIN — Medication 100 MILLIGRAM(S): at 08:02

## 2019-07-07 LAB
GLUCOSE BLDC GLUCOMTR-MCNC: 115 MG/DL — HIGH (ref 70–99)
GLUCOSE BLDC GLUCOMTR-MCNC: 129 MG/DL — HIGH (ref 70–99)
GLUCOSE BLDC GLUCOMTR-MCNC: 129 MG/DL — HIGH (ref 70–99)
GLUCOSE BLDC GLUCOMTR-MCNC: 254 MG/DL — HIGH (ref 70–99)

## 2019-07-07 RX ADMIN — Medication 3 UNIT(S): at 16:21

## 2019-07-07 RX ADMIN — INSULIN GLARGINE 25 UNIT(S): 100 INJECTION, SOLUTION SUBCUTANEOUS at 20:11

## 2019-07-07 RX ADMIN — LOSARTAN POTASSIUM 50 MILLIGRAM(S): 100 TABLET, FILM COATED ORAL at 08:02

## 2019-07-07 RX ADMIN — MIRTAZAPINE 7.5 MILLIGRAM(S): 45 TABLET, ORALLY DISINTEGRATING ORAL at 20:11

## 2019-07-07 RX ADMIN — Medication 100 MILLIGRAM(S): at 08:02

## 2019-07-07 RX ADMIN — CLOZAPINE 100 MILLIGRAM(S): 150 TABLET, ORALLY DISINTEGRATING ORAL at 07:37

## 2019-07-07 RX ADMIN — Medication 3 UNIT(S): at 11:20

## 2019-07-07 RX ADMIN — Medication 3 UNIT(S): at 07:33

## 2019-07-07 RX ADMIN — PIOGLITAZONE HYDROCHLORIDE 30 MILLIGRAM(S): 15 TABLET ORAL at 08:02

## 2019-07-07 RX ADMIN — ATORVASTATIN CALCIUM 40 MILLIGRAM(S): 80 TABLET, FILM COATED ORAL at 20:10

## 2019-07-07 RX ADMIN — CLOZAPINE 400 MILLIGRAM(S): 150 TABLET, ORALLY DISINTEGRATING ORAL at 20:11

## 2019-07-08 LAB
GLUCOSE BLDC GLUCOMTR-MCNC: 114 MG/DL — HIGH (ref 70–99)
GLUCOSE BLDC GLUCOMTR-MCNC: 126 MG/DL — HIGH (ref 70–99)
GLUCOSE BLDC GLUCOMTR-MCNC: 178 MG/DL — HIGH (ref 70–99)
GLUCOSE BLDC GLUCOMTR-MCNC: 214 MG/DL — HIGH (ref 70–99)

## 2019-07-08 PROCEDURE — 99231 SBSQ HOSP IP/OBS SF/LOW 25: CPT

## 2019-07-08 RX ADMIN — PIOGLITAZONE HYDROCHLORIDE 30 MILLIGRAM(S): 15 TABLET ORAL at 08:11

## 2019-07-08 RX ADMIN — Medication 3 UNIT(S): at 16:16

## 2019-07-08 RX ADMIN — CLOZAPINE 400 MILLIGRAM(S): 150 TABLET, ORALLY DISINTEGRATING ORAL at 20:04

## 2019-07-08 RX ADMIN — INSULIN GLARGINE 25 UNIT(S): 100 INJECTION, SOLUTION SUBCUTANEOUS at 20:06

## 2019-07-08 RX ADMIN — Medication 1: at 11:27

## 2019-07-08 RX ADMIN — CLOZAPINE 100 MILLIGRAM(S): 150 TABLET, ORALLY DISINTEGRATING ORAL at 08:10

## 2019-07-08 RX ADMIN — Medication 2: at 16:14

## 2019-07-08 RX ADMIN — LOSARTAN POTASSIUM 50 MILLIGRAM(S): 100 TABLET, FILM COATED ORAL at 08:11

## 2019-07-08 RX ADMIN — Medication 3 UNIT(S): at 07:33

## 2019-07-08 RX ADMIN — MIRTAZAPINE 7.5 MILLIGRAM(S): 45 TABLET, ORALLY DISINTEGRATING ORAL at 20:04

## 2019-07-08 RX ADMIN — ATORVASTATIN CALCIUM 40 MILLIGRAM(S): 80 TABLET, FILM COATED ORAL at 20:04

## 2019-07-08 RX ADMIN — Medication 3 UNIT(S): at 11:27

## 2019-07-08 RX ADMIN — Medication 100 MILLIGRAM(S): at 08:10

## 2019-07-08 NOTE — PROGRESS NOTE BEHAVIORAL HEALTH - NSBHFUPINTERVALHXFT_PSY_A_CORE
Patient seen and examined during team meeting this morning, chart reviewed. Per nursing staff, no acute events over the weekend. Patient still psychotic and bizarre at times, but pleasant, visible on unit and not a management issue. Continues to endorse biblical auditory hallucinations which are not distressing. Patient reports that her eyes have been getting a "little blurry" at times but is unable to elaborate. Otherwise, no new complaints and has been compliant with medications, tolerating increased doses. Eating and drinking well. Denies s/h ideation.

## 2019-07-08 NOTE — CHART NOTE - NSCHARTNOTEFT_GEN_A_CORE
Pt. actively participated in today's treatment team meeting.  She has an anticipated discharge date of 7/10.  Pt. will return to her apartment and will continue mental health services at [facility].  Pt. continues to deny any suicidal or homicidal ideation or any recent A/V hallucinations.  Writer will contact pt.'s , Kianna Agosto to inform her of pt.'s scheduled discharge date.  Pt. is not yet ready for discharge on this date.

## 2019-07-08 NOTE — PROGRESS NOTE BEHAVIORAL HEALTH - SUMMARY
50 yo  female with long hx of schizophrenia, DMII and HTN, [place] [facility] [place], referred self to ED for depression less than 24 hours after her roommate called 911 due to bizarre behavior.    Upon assessment, patient remains internally preoccupied, endorses AH, is isolative and flat, but has been compliant with medications and behaviorally stable. Most recent Clozaril levels from 6/11 was 262. Patient requires further titration. Continue to monitor treatment response. Will need to determine safe disposition plan. Anticipate for Wednesday 7/10.      #Schizophrenia  - continue with Clozaril taper (100 mg am, 400 mg qhs)    - Pt received Prolixin dec 37.5mg [id number] IM injection given on 6/28/19, next due on 7/12/19  - Clozaril levels pending     #Depressive symptoms, Insomnia  -c/w Remeron 7.5mg PO QHS  Ativan 1 mg PO or IM for severe anxiety    #Chronic Medical Issues   - c/w home medications

## 2019-07-09 LAB
BASOPHILS # BLD AUTO: 0.06 K/UL — SIGNIFICANT CHANGE UP (ref 0–0.2)
BASOPHILS NFR BLD AUTO: 0.7 % — SIGNIFICANT CHANGE UP (ref 0–1)
CLOZAPINE SERPL-MCNC: 428 NG/ML — SIGNIFICANT CHANGE UP (ref 350–600)
CLOZAPINE SPEC-SCNC: 664 NG/ML — SIGNIFICANT CHANGE UP
EOSINOPHIL # BLD AUTO: 0.31 K/UL — SIGNIFICANT CHANGE UP (ref 0–0.7)
EOSINOPHIL NFR BLD AUTO: 3.4 % — SIGNIFICANT CHANGE UP (ref 0–8)
GLUCOSE BLDC GLUCOMTR-MCNC: 142 MG/DL — HIGH (ref 70–99)
GLUCOSE BLDC GLUCOMTR-MCNC: 151 MG/DL — HIGH (ref 70–99)
GLUCOSE BLDC GLUCOMTR-MCNC: 158 MG/DL — HIGH (ref 70–99)
GLUCOSE BLDC GLUCOMTR-MCNC: 196 MG/DL — HIGH (ref 70–99)
HCT VFR BLD CALC: 39.6 % — SIGNIFICANT CHANGE UP (ref 37–47)
HGB BLD-MCNC: 12.5 G/DL — SIGNIFICANT CHANGE UP (ref 12–16)
IMM GRANULOCYTES NFR BLD AUTO: 0.4 % — HIGH (ref 0.1–0.3)
LYMPHOCYTES # BLD AUTO: 2.24 K/UL — SIGNIFICANT CHANGE UP (ref 1.2–3.4)
LYMPHOCYTES # BLD AUTO: 24.5 % — SIGNIFICANT CHANGE UP (ref 20.5–51.1)
MCHC RBC-ENTMCNC: 28.1 PG — SIGNIFICANT CHANGE UP (ref 27–31)
MCHC RBC-ENTMCNC: 31.6 G/DL — LOW (ref 32–37)
MCV RBC AUTO: 89 FL — SIGNIFICANT CHANGE UP (ref 81–99)
MONOCYTES # BLD AUTO: 0.69 K/UL — HIGH (ref 0.1–0.6)
MONOCYTES NFR BLD AUTO: 7.5 % — SIGNIFICANT CHANGE UP (ref 1.7–9.3)
NEUTROPHILS # BLD AUTO: 5.82 K/UL — SIGNIFICANT CHANGE UP (ref 1.4–6.5)
NEUTROPHILS NFR BLD AUTO: 63.5 % — SIGNIFICANT CHANGE UP (ref 42.2–75.2)
NORCLOZAPINE SERPL-MCNC: 236 NG/ML — SIGNIFICANT CHANGE UP
NRBC # BLD: 0 /100 WBCS — SIGNIFICANT CHANGE UP (ref 0–0)
PLATELET # BLD AUTO: 335 K/UL — SIGNIFICANT CHANGE UP (ref 130–400)
RBC # BLD: 4.45 M/UL — SIGNIFICANT CHANGE UP (ref 4.2–5.4)
RBC # FLD: 14.2 % — SIGNIFICANT CHANGE UP (ref 11.5–14.5)
WBC # BLD: 9.16 K/UL — SIGNIFICANT CHANGE UP (ref 4.8–10.8)
WBC # FLD AUTO: 9.16 K/UL — SIGNIFICANT CHANGE UP (ref 4.8–10.8)

## 2019-07-09 PROCEDURE — 99231 SBSQ HOSP IP/OBS SF/LOW 25: CPT

## 2019-07-09 RX ADMIN — CLOZAPINE 400 MILLIGRAM(S): 150 TABLET, ORALLY DISINTEGRATING ORAL at 20:22

## 2019-07-09 RX ADMIN — Medication 1: at 07:32

## 2019-07-09 RX ADMIN — Medication 100 MILLIGRAM(S): at 08:39

## 2019-07-09 RX ADMIN — INSULIN GLARGINE 25 UNIT(S): 100 INJECTION, SOLUTION SUBCUTANEOUS at 20:23

## 2019-07-09 RX ADMIN — HALOPERIDOL DECANOATE 5 MILLIGRAM(S): 100 INJECTION INTRAMUSCULAR at 20:30

## 2019-07-09 RX ADMIN — ATORVASTATIN CALCIUM 40 MILLIGRAM(S): 80 TABLET, FILM COATED ORAL at 20:23

## 2019-07-09 RX ADMIN — Medication 1: at 16:34

## 2019-07-09 RX ADMIN — Medication 3 UNIT(S): at 07:31

## 2019-07-09 RX ADMIN — CLOZAPINE 100 MILLIGRAM(S): 150 TABLET, ORALLY DISINTEGRATING ORAL at 08:39

## 2019-07-09 RX ADMIN — Medication 3 UNIT(S): at 16:33

## 2019-07-09 RX ADMIN — PIOGLITAZONE HYDROCHLORIDE 30 MILLIGRAM(S): 15 TABLET ORAL at 08:39

## 2019-07-09 RX ADMIN — Medication 3 UNIT(S): at 11:23

## 2019-07-09 RX ADMIN — MIRTAZAPINE 7.5 MILLIGRAM(S): 45 TABLET, ORALLY DISINTEGRATING ORAL at 20:22

## 2019-07-09 RX ADMIN — LOSARTAN POTASSIUM 50 MILLIGRAM(S): 100 TABLET, FILM COATED ORAL at 08:40

## 2019-07-09 NOTE — PROGRESS NOTE BEHAVIORAL HEALTH - NSBHADMITNEWMED_PSY_A_CORE
no
yes
yes
no
no
yes
no
yes
no
yes
no
yes
no
yes
no
no

## 2019-07-09 NOTE — PROGRESS NOTE BEHAVIORAL HEALTH - REMOTE MEMORY
Impaired

## 2019-07-09 NOTE — PROGRESS NOTE BEHAVIORAL HEALTH - AXIS III
HTN, DM

## 2019-07-09 NOTE — PROGRESS NOTE BEHAVIORAL HEALTH - MODIFICATIONS
seen/discussed with resident. No s/h ideation. Internal preoccupation and thought blocking persist.  Continue titration
seen/discussed with resident.  Less thought blocking today. Continue clozapine titration and return to residence when stable
seen/discussed with resident. Mood neutral; psychosis persists as noted.  Continue clozapine titration and return to apartment program when clinically stable
as noted' will check clozapine level; continue meds
seen/discussed with resident in team meeting. She remains psychotic; no s/h ideation. Will continue to adjust meds to minimize sx
seen/discussed with resident.  Pt remains psychotic; has no concerns about hallucinations dealing with Mandaen matters.  Will continue med  adjustments; not yet stable for d/c
seen/discussed with resident.  Remains psychotic, but in no distress.  No evidence of EPS or akathisia
seen/discussed with resident. No s/h ideation; will continue to adjust meds
seen/discussed with resident. Pt admits to hallucinations, and appears internally preoccupied. No s/h ideation
As above.
Seen/discussed with resident.  No s/h ideation.  Continued psychosis and Tenriism preoccupation .
seen and discussed with resident.  No s/h ideation. Pt not distressed with ongoing hallucinations/Worship preoccupation.  Will continue titration as indiated and pt will return to community when at baseline
seen/discussed with resident.  No s/h ideation.  Continue to adjust meds /will return home when at baseline
seen/discussed with resident.  Will continue tx, and proceed with d/c planning
seen/discussed with resident. No s/h ideation; Catholic preoccupations/delusions persist. Will continue clozaril and proceed with d/c planning
seen/discussed with resident. No s/h ideation. Mood neutral; continues psychosis persists, however there is no concern or distress on the patient's part
As above.
seen/discussed with resident.  No s/h ideation
seen/discussed with resident.  No s/h ideation; will proceed with d/c planning

## 2019-07-09 NOTE — PROGRESS NOTE BEHAVIORAL HEALTH - NSBHADMITDANGERSELF_PSY_A_CORE
unable to care for self
suicidal behavior
unable to care for self
unable to care for self
suicidal behavior
unable to care for self
unable to care for self
suicidal behavior
unable to care for self
unable to care for self
suicidal behavior
unable to care for self
suicidal behavior
unable to care for self
suicidal behavior
suicidal behavior

## 2019-07-09 NOTE — PROGRESS NOTE BEHAVIORAL HEALTH - NSBHFUPMEDSE_PSY_A_CORE
None known

## 2019-07-09 NOTE — PROGRESS NOTE BEHAVIORAL HEALTH - NSBHADMITIPREASON_PSY_A_CORE
Danger to self; mental illness expected to respond to inpatient care

## 2019-07-09 NOTE — PROGRESS NOTE BEHAVIORAL HEALTH - INSIGHT (REGARDING PSYCHIATRIC ILLNESS)
Poor
Fair
Fair
Poor
Fair
Poor
Fair
Poor
Fair
Fair
Poor
Poor
Fair
Fair
Poor
Fair
Poor
Poor
Fair

## 2019-07-09 NOTE — PROGRESS NOTE BEHAVIORAL HEALTH - THOUGHT PROCESS
Linear/Thought blocking
Thought blocking/Linear
Linear/Thought blocking
Thought blocking
Thought blocking/Illogical/Impaired reasoning
Thought blocking/Linear
Linear/Thought blocking
Thought blocking/Linear
Impaired reasoning/Thought blocking
Thought blocking/Illogical/Impaired reasoning
Thought blocking/Linear
Illogical/Thought blocking/Impaired reasoning
Linear/Thought blocking/Impaired reasoning
Thought blocking
Impaired reasoning/Illogical/Thought blocking
Linear/Thought blocking
Thought blocking
Thought blocking
Thought blocking/Linear
Thought blocking/Linear
Linear/Thought blocking
Linear/Thought blocking
Thought blocking/Linear
Thought blocking/Linear
Linear/Thought blocking
Linear/Thought blocking
Thought blocking
Thought blocking/Linear
Thought blocking/Illogical/Impaired reasoning
Impaired reasoning/Thought blocking
Thought blocking/Linear
Linear/Thought blocking

## 2019-07-09 NOTE — PROGRESS NOTE BEHAVIORAL HEALTH - NSBHADMITMEDEDU_PSY_A_CORE
yes...

## 2019-07-09 NOTE — PROGRESS NOTE BEHAVIORAL HEALTH - AFFECT CONGRUENCE
Not congruent
Congruent
Congruent
Not congruent
Congruent
Not congruent

## 2019-07-09 NOTE — PROGRESS NOTE BEHAVIORAL HEALTH - LANGUAGE
No abnormalities noted

## 2019-07-09 NOTE — PROGRESS NOTE BEHAVIORAL HEALTH - JUDGMENT (REGARDING EVERYDAY EVENTS)
Poor
Good
Fair
Good
Good
Fair
Good
Poor
Poor
Fair
Good
Good
Poor
Poor
Good
Fair
Poor
Fair
Poor
Good
Poor
Fair
Good

## 2019-07-09 NOTE — PROGRESS NOTE BEHAVIORAL HEALTH - NSBHFUPINTERVALCCFT_PSY_A_CORE
"I'm good"
"I'm alright"
"I'm fine"
"I've been walking a lot"
"a little better"
"feel hot"
"feeling more relaxed"
"feeling restless"
"feeling restless"
"fine"
"more peaceful"
"trying to relax"
"weekend was good"
Pt. is feeling better, less depressed. Denied A/V hallucination. Pt. was non compliant and has aggressive behavior.
mood is pleasant; no s/h ideation or overt psychosis. Pt showered today; spent some time out of room.
pt with schizophrenia; known to inpt unit, admitted 939 because she was "feeling sad". Pt seen and initially released from ED, was brought back after roommate at apartment program said she was 'acting weird". No s/h ideation.  Pt admits to auditory hallucinations saying "a lot of things" . Pt staring intensely during interview; answers with one word answers.  Feels that she needs to be in hospital for now
"I feel exhausted"
Pt is pleasant on approach. She remains psychotic; Gid speaks to her and tells her funny stories. No s/h ideation
"sad"
"restless"
"slept better"
"I'm ok"
Pt attended and participated in treatment team meeting. She remains psychotic; essentially unchanged.  Will continue med adjustments and awaiting  visit to assess pt (?baseline status).  No s/h ideation
No s/h ideation or behavior issues.  Taoism preoccupations and hallucinations remain; not of concern to patient. No command hallucinations
Pt remains psychotic; hears voices of/from G-d, and of Latter-day content.  Pt is not distressed by this.  She can also be observed playing imaginary keyboard.  No s/h ideation; adls remain fair-poor
"doing okay"
"little anxious"
"I feel tired"
"thinking a lot"
"slept better"
"feeling bored"
"I'm exhausted"

## 2019-07-09 NOTE — PROGRESS NOTE BEHAVIORAL HEALTH - NSBHADMITIPBHPROVIDER_PSY_A_CORE
N/A

## 2019-07-09 NOTE — PROGRESS NOTE BEHAVIORAL HEALTH - NS ED BHA MSE SPEECH SPONTANEITY
Increased latency

## 2019-07-09 NOTE — PROGRESS NOTE BEHAVIORAL HEALTH - OTHER
flat
flat
"exhausted"
flat
"exhausted"
intense
flat
"exhausted"
flat
"exhausted"
flat

## 2019-07-09 NOTE — PROGRESS NOTE BEHAVIORAL HEALTH - AFFECT QUALITY
Depressed
Anxious/Euthymic
Depressed
Euthymic
Depressed
Anxious/Euthymic
Depressed
Euthymic
Depressed
Euthymic

## 2019-07-09 NOTE — PROGRESS NOTE BEHAVIORAL HEALTH - MOOD
Depressed
Depressed
Normal
Depressed
Normal
Other
Normal
Anxious
Anxious
Depressed
Other
Normal
Depressed
Normal
Depressed
Normal
Depressed
Other
Other
Depressed
Normal
Depressed
Normal

## 2019-07-09 NOTE — PROGRESS NOTE BEHAVIORAL HEALTH - NSBHADMITIPOBSFT_PSY_A_CORE
as indicated
per unit protocol
as indicated
per unit protocol
as indicated
per unit protocol
as indicated
per unit protocol
as indicated
as indicated
as clinically indicated
per unit protocol
as indicated
per unit protocol
per unit protocol
as indicated
as is indicated
per unit protocol
per unit protocol

## 2019-07-09 NOTE — PROGRESS NOTE BEHAVIORAL HEALTH - MUSCLE TONE / STRENGTH
Normal muscle tone/strength

## 2019-07-09 NOTE — PROGRESS NOTE BEHAVIORAL HEALTH - NSBHLOC_PSY_A_CORE
Alert

## 2019-07-09 NOTE — PROGRESS NOTE BEHAVIORAL HEALTH - BODY HABITUS
Well nourished

## 2019-07-09 NOTE — PROGRESS NOTE BEHAVIORAL HEALTH - PRIMARY DX
Schizophrenia
Schizoaffective disorder, chronic condition with acute exacerbation
Paranoid schizophrenia
Schizophrenia
Paranoid schizophrenia
Schizophrenia
Schizoaffective disorder, chronic condition with acute exacerbation
Schizophrenia
Schizophrenia
Paranoid schizophrenia
Paranoid schizophrenia
Schizophrenia
Schizophrenia
Paranoid schizophrenia
Schizophrenia
Paranoid schizophrenia
Schizophrenia
Paranoid schizophrenia
Schizophrenia
Paranoid schizophrenia
Paranoid schizophrenia

## 2019-07-09 NOTE — PROGRESS NOTE BEHAVIORAL HEALTH - CASE SUMMARY
as noted
as discussed
as noted
as noted.
Pt was seen and discussed with the resident, Dr. Nciholson. Chart reviewed. Agree with assessment and plan above. Continue with medications as above.
as noted
Pt was seen and discussed with the resident, Dr. Nicholson. Chart reviewed. Agree with assessment and plan above. Continue with medications as above.
Pt was seen and discussed with the resident, Dr. Nicholson. Chart reviewed. Agree with assessment and plan above. Continue with medications as above.
Pt was seen and discussed with the resident, Dr. Nieto. Chart reviewed. Agree with assessment and plan above. Continue with medications as above.
Pt was seen and discussed with the resident, Dr. Nieto. Chart reviewed. Agree with assessment and plan above. Continue with medications as above.
as noted
as noted

## 2019-07-09 NOTE — PROGRESS NOTE BEHAVIORAL HEALTH - NSBHADMITIPOBS_PSY_A_CORE
Routine observation
Enhanced supervision
Routine observation
Enhanced supervision
Routine observation
Enhanced supervision
Routine observation
Enhanced supervision
Routine observation
Routine observation
Enhanced supervision
Routine observation
Enhanced supervision
Routine observation
Enhanced supervision
Enhanced supervision

## 2019-07-09 NOTE — PROGRESS NOTE BEHAVIORAL HEALTH - NSBHFUPINTERVALHXFT_PSY_A_CORE
Patient seen and examined during team meeting this morning, chart reviewed. Per nursing staff, no behavioral concerns. Patient still psychotic and bizarre at times, but pleasant, visible on unit and not a management issue. No new complaints and has been compliant with medications, tolerating increased doses. Eating and drinking well. Denies s/h ideation.

## 2019-07-09 NOTE — PROGRESS NOTE BEHAVIORAL HEALTH - PERCEPTIONS
Auditory hallucinations
No abnormalities
Auditory hallucinations

## 2019-07-09 NOTE — PROGRESS NOTE BEHAVIORAL HEALTH - NSBHCHARTREVIEWLAB_PSY_A_CORE FT
Complete Blood Count + Automated Diff in AM (07.02.19 @ 07:47)    WBC Count: 8.56 K/uL    RBC Count: 4.75 M/uL    Hemoglobin: 13.3 g/dL    Hematocrit: 42.1 %    Mean Cell Volume: 88.6 fL    Mean Cell Hemoglobin: 28.0 pg    Mean Cell Hemoglobin Conc: 31.6 g/dL    Red Cell Distrib Width: 14.2 %    Platelet Count - Automated: 367 K/uL    Auto Neutrophil #: 5.31 K/uL    Auto Lymphocyte #: 2.16 K/uL    Auto Monocyte #: 0.67 K/uL    Auto Eosinophil #: 0.36 K/uL    Auto Basophil #: 0.04 K/uL    Auto Neutrophil %: 62.1: Differential percentages must be correlated with absolute numbers for  clinical significance. %    Auto Lymphocyte %: 25.2 %    Auto Monocyte %: 7.8 %    Auto Eosinophil %: 4.2 %    Auto Basophil %: 0.5 %    Auto Immature Granulocyte %: 0.2 %    Nucleated RBC: 0 /100 WBCs
Comprehensive Metabolic Panel (05.30.19 @ 07:19)    Sodium, Serum: 143 mmol/L    Potassium, Serum: 3.9 mmol/L    Chloride, Serum: 111 mmol/L    Carbon Dioxide, Serum: 18 mmol/L    Anion Gap, Serum: 14 mmol/L    Blood Urea Nitrogen, Serum: 13 mg/dL    Creatinine, Serum: 0.8 mg/dL    Glucose, Serum: 133 mg/dL    Calcium, Total Serum: 9.9 mg/dL    Protein Total, Serum: 6.7 g/dL    Albumin, Serum: 3.7 g/dL    Bilirubin Total, Serum: 0.5 mg/dL    Alkaline Phosphatase, Serum: 97 U/L    Aspartate Aminotransferase (AST/SGOT): 28 U/L    Alanine Aminotransferase (ALT/SGPT): 14 U/L    eGFR if Non : 87:     Complete Blood Count + Automated Diff (05.28.19 @ 20:40)    WBC Count: 15.77 K/uL    RBC Count: 4.84 M/uL    Hemoglobin: 13.9 g/dL    Hematocrit: 43.5 %    Mean Cell Volume: 89.9 fL    Mean Cell Hemoglobin: 28.7 pg    Mean Cell Hemoglobin Conc: 32.0 g/dL    Red Cell Distrib Width: 14.3 %    Platelet Count - Automated: 383 K/uL    Auto Neutrophil #: 13.31 K/uL    Auto Lymphocyte #: 1.35 K/uL    Auto Monocyte #: 0.95 K/uL    Auto Eosinophil #: 0.02 K/uL    Auto Basophil #: 0.06 K/uL    Auto Neutrophil %: 84.4: Differential percentages must be correlated with absolute numbers for  clinical significance. %    Auto Lymphocyte %: 8.6 %    Auto Monocyte %: 6.0 %    Auto Eosinophil %: 0.1 %    Auto Basophil %: 0.4 %    Auto Immature Granulocyte %: 0.5 %    Nucleated RBC: 0 /100 WBCs
Comprehensive Metabolic Panel (05.30.19 @ 07:19)    Sodium, Serum: 143 mmol/L    Potassium, Serum: 3.9 mmol/L    Chloride, Serum: 111 mmol/L    Carbon Dioxide, Serum: 18 mmol/L    Anion Gap, Serum: 14 mmol/L    Blood Urea Nitrogen, Serum: 13 mg/dL    Creatinine, Serum: 0.8 mg/dL    Glucose, Serum: 133 mg/dL    Calcium, Total Serum: 9.9 mg/dL    Protein Total, Serum: 6.7 g/dL    Albumin, Serum: 3.7 g/dL    Bilirubin Total, Serum: 0.5 mg/dL    Alkaline Phosphatase, Serum: 97 U/L    Aspartate Aminotransferase (AST/SGOT): 28 U/L    Alanine Aminotransferase (ALT/SGPT): 14 U/L    eGFR if Non : 87:     Complete Blood Count + Automated Diff (05.28.19 @ 20:40)    WBC Count: 15.77 K/uL    RBC Count: 4.84 M/uL    Hemoglobin: 13.9 g/dL    Hematocrit: 43.5 %    Mean Cell Volume: 89.9 fL    Mean Cell Hemoglobin: 28.7 pg    Mean Cell Hemoglobin Conc: 32.0 g/dL    Red Cell Distrib Width: 14.3 %    Platelet Count - Automated: 383 K/uL    Auto Neutrophil #: 13.31 K/uL    Auto Lymphocyte #: 1.35 K/uL    Auto Monocyte #: 0.95 K/uL    Auto Eosinophil #: 0.02 K/uL    Auto Basophil #: 0.06 K/uL    Auto Neutrophil %: 84.4: Differential percentages must be correlated with absolute numbers for  clinical significance. %    Auto Lymphocyte %: 8.6 %    Auto Monocyte %: 6.0 %    Auto Eosinophil %: 0.1 %    Auto Basophil %: 0.4 %    Auto Immature Granulocyte %: 0.5 %    Nucleated RBC: 0 /100 WBCs
Complete Blood Count + Automated Diff in AM (07.09.19 @ 08:37)    WBC Count: 9.16 K/uL    RBC Count: 4.45 M/uL    Hemoglobin: 12.5 g/dL    Hematocrit: 39.6 %    Mean Cell Volume: 89.0 fL    Mean Cell Hemoglobin: 28.1 pg    Mean Cell Hemoglobin Conc: 31.6 g/dL    Red Cell Distrib Width: 14.2 %    Platelet Count - Automated: 335 K/uL    Auto Neutrophil #: 5.82 K/uL    Auto Lymphocyte #: 2.24 K/uL    Auto Monocyte #: 0.69 K/uL    Auto Eosinophil #: 0.31 K/uL    Auto Basophil #: 0.06 K/uL    Auto Neutrophil %: 63.5: Differential percentages must be correlated with absolute numbers for  clinical significance. %    Auto Lymphocyte %: 24.5 %    Auto Monocyte %: 7.5 %    Auto Eosinophil %: 3.4 %    Auto Basophil %: 0.7 %    Auto Immature Granulocyte %: 0.4 %    Nucleated RBC: 0 /100 WBCs
Complete Blood Count + Automated Diff in AM (06.26.19 @ 08:50)    WBC Count: 8.63 K/uL    RBC Count: 4.50 M/uL    Hemoglobin: 12.7 g/dL    Hematocrit: 40.6 %    Mean Cell Volume: 90.2 fL    Mean Cell Hemoglobin: 28.2 pg    Mean Cell Hemoglobin Conc: 31.3 g/dL    Red Cell Distrib Width: 14.5 %    Platelet Count - Automated: 360 K/uL    Auto Neutrophil #: 5.61 K/uL    Auto Lymphocyte #: 2.16 K/uL    Auto Monocyte #: 0.53 K/uL    Auto Eosinophil #: 0.27 K/uL    Auto Basophil #: 0.04 K/uL    Auto Neutrophil %: 65.1: Differential percentages must be correlated with absolute numbers for  clinical significance. %    Auto Lymphocyte %: 25.0 %    Auto Monocyte %: 6.1 %    Auto Eosinophil %: 3.1 %    Auto Basophil %: 0.5 %    Auto Immature Granulocyte %: 0.2 %    Nucleated RBC: 0 /100 WBCs
Complete Blood Count + Automated Diff in AM (06.11.19 @ 07:17)    WBC Count: 6.86 K/uL    RBC Count: 4.47 M/uL    Hemoglobin: 12.6 g/dL    Hematocrit: 39.5 %    Mean Cell Volume: 88.4 fL    Mean Cell Hemoglobin: 28.2 pg    Mean Cell Hemoglobin Conc: 31.9 g/dL    Red Cell Distrib Width: 13.9 %    Platelet Count - Automated: 296 K/uL    Auto Neutrophil #: 4.02 K/uL    Auto Lymphocyte #: 1.95 K/uL    Auto Monocyte #: 0.57 K/uL    Auto Eosinophil #: 0.24 K/uL    Auto Basophil #: 0.06 K/uL    Auto Neutrophil %: 58.6: Differential percentages must be correlated with absolute numbers for  clinical significance. %    Auto Lymphocyte %: 28.4 %    Auto Monocyte %: 8.3 %    Auto Eosinophil %: 3.5 %    Auto Basophil %: 0.9 %    Auto Immature Granulocyte %: 0.3 %    Nucleated RBC: 0 /100 WBCs
Complete Blood Count + Automated Diff in AM (06.04.19 @ 06:49)    WBC Count: 7.39 K/uL    RBC Count: 5.00 M/uL    Hemoglobin: 13.9 g/dL    Hematocrit: 44.2 %    Mean Cell Volume: 88.4 fL    Mean Cell Hemoglobin: 27.8 pg    Mean Cell Hemoglobin Conc: 31.4 g/dL    Red Cell Distrib Width: 13.8 %    Platelet Count - Automated: 317 K/uL    Auto Neutrophil #: 4.10 K/uL    Auto Lymphocyte #: 2.29 K/uL    Auto Monocyte #: 0.70 K/uL    Auto Eosinophil #: 0.22 K/uL    Auto Basophil #: 0.06 K/uL    Auto Neutrophil %: 55.4: Differential percentages must be correlated with absolute numbers for  clinical significance. %    Auto Lymphocyte %: 31.0 %    Auto Monocyte %: 9.5 %    Auto Eosinophil %: 3.0 %    Auto Basophil %: 0.8 %    Auto Immature Granulocyte %: 0.3 %    Nucleated RBC: 0 /100 WBCs
Complete Blood Count + Automated Diff in AM (06.11.19 @ 07:17)    WBC Count: 6.86 K/uL    RBC Count: 4.47 M/uL    Hemoglobin: 12.6 g/dL    Hematocrit: 39.5 %    Mean Cell Volume: 88.4 fL    Mean Cell Hemoglobin: 28.2 pg    Mean Cell Hemoglobin Conc: 31.9 g/dL    Red Cell Distrib Width: 13.9 %    Platelet Count - Automated: 296 K/uL    Auto Neutrophil #: 4.02 K/uL    Auto Lymphocyte #: 1.95 K/uL    Auto Monocyte #: 0.57 K/uL    Auto Eosinophil #: 0.24 K/uL    Auto Basophil #: 0.06 K/uL    Auto Neutrophil %: 58.6: Differential percentages must be correlated with absolute numbers for  clinical significance. %    Auto Lymphocyte %: 28.4 %    Auto Monocyte %: 8.3 %    Auto Eosinophil %: 3.5 %    Auto Basophil %: 0.9 %    Auto Immature Granulocyte %: 0.3 %    Nucleated RBC: 0 /100 WBCs
Complete Blood Count + Automated Diff in AM (06.19.19 @ 09:01)    WBC Count: 9.19 K/uL    RBC Count: 4.54 M/uL    Hemoglobin: 12.9 g/dL    Hematocrit: 40.8 %    Mean Cell Volume: 89.9 fL    Mean Cell Hemoglobin: 28.4 pg    Mean Cell Hemoglobin Conc: 31.6 g/dL    Red Cell Distrib Width: 14.5 %    Platelet Count - Automated: 383 K/uL    Auto Neutrophil #: 6.39 K/uL    Auto Lymphocyte #: 2.00 K/uL    Auto Monocyte #: 0.50 K/uL    Auto Eosinophil #: 0.22 K/uL    Auto Basophil #: 0.06 K/uL    Auto Neutrophil %: 69.5: Differential percentages must be correlated with absolute numbers for  clinical significance. %    Auto Lymphocyte %: 21.8 %    Auto Monocyte %: 5.4 %    Auto Eosinophil %: 2.4 %    Auto Basophil %: 0.7 %    Auto Immature Granulocyte %: 0.2 %    Nucleated RBC: 0 /100 WBCs
Complete Blood Count + Automated Diff in AM (06.19.19 @ 09:01)    WBC Count: 9.19 K/uL    RBC Count: 4.54 M/uL    Hemoglobin: 12.9 g/dL    Hematocrit: 40.8 %    Mean Cell Volume: 89.9 fL    Mean Cell Hemoglobin: 28.4 pg    Mean Cell Hemoglobin Conc: 31.6 g/dL    Red Cell Distrib Width: 14.5 %    Platelet Count - Automated: 383 K/uL    Auto Neutrophil #: 6.39 K/uL    Auto Lymphocyte #: 2.00 K/uL    Auto Monocyte #: 0.50 K/uL    Auto Eosinophil #: 0.22 K/uL    Auto Basophil #: 0.06 K/uL    Auto Neutrophil %: 69.5: Differential percentages must be correlated with absolute numbers for  clinical significance. %    Auto Lymphocyte %: 21.8 %    Auto Monocyte %: 5.4 %    Auto Eosinophil %: 2.4 %    Auto Basophil %: 0.7 %    Auto Immature Granulocyte %: 0.2 %    Nucleated RBC: 0 /100 WBCs
Complete Blood Count + Automated Diff in AM (06.26.19 @ 08:50)    WBC Count: 8.63 K/uL    RBC Count: 4.50 M/uL    Hemoglobin: 12.7 g/dL    Hematocrit: 40.6 %    Mean Cell Volume: 90.2 fL    Mean Cell Hemoglobin: 28.2 pg    Mean Cell Hemoglobin Conc: 31.3 g/dL    Red Cell Distrib Width: 14.5 %    Platelet Count - Automated: 360 K/uL    Auto Neutrophil #: 5.61 K/uL    Auto Lymphocyte #: 2.16 K/uL    Auto Monocyte #: 0.53 K/uL    Auto Eosinophil #: 0.27 K/uL    Auto Basophil #: 0.04 K/uL    Auto Neutrophil %: 65.1: Differential percentages must be correlated with absolute numbers for  clinical significance. %    Auto Lymphocyte %: 25.0 %    Auto Monocyte %: 6.1 %    Auto Eosinophil %: 3.1 %    Auto Basophil %: 0.5 %    Auto Immature Granulocyte %: 0.2 %    Nucleated RBC: 0 /100 WBCs
Complete Blood Count + Automated Diff in AM (07.02.19 @ 07:47)    WBC Count: 8.56 K/uL    RBC Count: 4.75 M/uL    Hemoglobin: 13.3 g/dL    Hematocrit: 42.1 %    Mean Cell Volume: 88.6 fL    Mean Cell Hemoglobin: 28.0 pg    Mean Cell Hemoglobin Conc: 31.6 g/dL    Red Cell Distrib Width: 14.2 %    Platelet Count - Automated: 367 K/uL    Auto Neutrophil #: 5.31 K/uL    Auto Lymphocyte #: 2.16 K/uL    Auto Monocyte #: 0.67 K/uL    Auto Eosinophil #: 0.36 K/uL    Auto Basophil #: 0.04 K/uL    Auto Neutrophil %: 62.1: Differential percentages must be correlated with absolute numbers for  clinical significance. %    Auto Lymphocyte %: 25.2 %    Auto Monocyte %: 7.8 %    Auto Eosinophil %: 4.2 %    Auto Basophil %: 0.5 %    Auto Immature Granulocyte %: 0.2 %    Nucleated RBC: 0 /100 WBCs
Complete Blood Count + Automated Diff in AM (06.26.19 @ 08:50)    WBC Count: 8.63 K/uL    RBC Count: 4.50 M/uL    Hemoglobin: 12.7 g/dL    Hematocrit: 40.6 %    Mean Cell Volume: 90.2 fL    Mean Cell Hemoglobin: 28.2 pg    Mean Cell Hemoglobin Conc: 31.3 g/dL    Red Cell Distrib Width: 14.5 %    Platelet Count - Automated: 360 K/uL    Auto Neutrophil #: 5.61 K/uL    Auto Lymphocyte #: 2.16 K/uL    Auto Monocyte #: 0.53 K/uL    Auto Eosinophil #: 0.27 K/uL    Auto Basophil #: 0.04 K/uL    Auto Neutrophil %: 65.1: Differential percentages must be correlated with absolute numbers for  clinical significance. %    Auto Lymphocyte %: 25.0 %    Auto Monocyte %: 6.1 %    Auto Eosinophil %: 3.1 %    Auto Basophil %: 0.5 %    Auto Immature Granulocyte %: 0.2 %    Nucleated RBC: 0 /100 WBCs
Comprehensive Metabolic Panel (05.30.19 @ 07:19)    Sodium, Serum: 143 mmol/L    Potassium, Serum: 3.9 mmol/L    Chloride, Serum: 111 mmol/L    Carbon Dioxide, Serum: 18 mmol/L    Anion Gap, Serum: 14 mmol/L    Blood Urea Nitrogen, Serum: 13 mg/dL    Creatinine, Serum: 0.8 mg/dL    Glucose, Serum: 133 mg/dL    Calcium, Total Serum: 9.9 mg/dL    Protein Total, Serum: 6.7 g/dL    Albumin, Serum: 3.7 g/dL    Bilirubin Total, Serum: 0.5 mg/dL    Alkaline Phosphatase, Serum: 97 U/L    Aspartate Aminotransferase (AST/SGOT): 28 U/L    Alanine Aminotransferase (ALT/SGPT): 14 U/L    eGFR if Non : 87:     Complete Blood Count + Automated Diff (05.28.19 @ 20:40)    WBC Count: 15.77 K/uL    RBC Count: 4.84 M/uL    Hemoglobin: 13.9 g/dL    Hematocrit: 43.5 %    Mean Cell Volume: 89.9 fL    Mean Cell Hemoglobin: 28.7 pg    Mean Cell Hemoglobin Conc: 32.0 g/dL    Red Cell Distrib Width: 14.3 %    Platelet Count - Automated: 383 K/uL    Auto Neutrophil #: 13.31 K/uL    Auto Lymphocyte #: 1.35 K/uL    Auto Monocyte #: 0.95 K/uL    Auto Eosinophil #: 0.02 K/uL    Auto Basophil #: 0.06 K/uL    Auto Neutrophil %: 84.4: Differential percentages must be correlated with absolute numbers for  clinical significance. %    Auto Lymphocyte %: 8.6 %    Auto Monocyte %: 6.0 %    Auto Eosinophil %: 0.1 %    Auto Basophil %: 0.4 %    Auto Immature Granulocyte %: 0.5 %    Nucleated RBC: 0 /100 WBCs
Complete Blood Count + Automated Diff in AM (06.19.19 @ 09:01)    WBC Count: 9.19 K/uL    RBC Count: 4.54 M/uL    Hemoglobin: 12.9 g/dL    Hematocrit: 40.8 %    Mean Cell Volume: 89.9 fL    Mean Cell Hemoglobin: 28.4 pg    Mean Cell Hemoglobin Conc: 31.6 g/dL    Red Cell Distrib Width: 14.5 %    Platelet Count - Automated: 383 K/uL    Auto Neutrophil #: 6.39 K/uL    Auto Lymphocyte #: 2.00 K/uL    Auto Monocyte #: 0.50 K/uL    Auto Eosinophil #: 0.22 K/uL    Auto Basophil #: 0.06 K/uL    Auto Neutrophil %: 69.5: Differential percentages must be correlated with absolute numbers for  clinical significance. %    Auto Lymphocyte %: 21.8 %    Auto Monocyte %: 5.4 %    Auto Eosinophil %: 2.4 %    Auto Basophil %: 0.7 %    Auto Immature Granulocyte %: 0.2 %    Nucleated RBC: 0 /100 WBCs
Complete Blood Count + Automated Diff in AM (06.19.19 @ 09:01)    WBC Count: 9.19 K/uL    RBC Count: 4.54 M/uL    Hemoglobin: 12.9 g/dL    Hematocrit: 40.8 %    Mean Cell Volume: 89.9 fL    Mean Cell Hemoglobin: 28.4 pg    Mean Cell Hemoglobin Conc: 31.6 g/dL    Red Cell Distrib Width: 14.5 %    Platelet Count - Automated: 383 K/uL    Auto Neutrophil #: 6.39 K/uL    Auto Lymphocyte #: 2.00 K/uL    Auto Monocyte #: 0.50 K/uL    Auto Eosinophil #: 0.22 K/uL    Auto Basophil #: 0.06 K/uL    Auto Neutrophil %: 69.5: Differential percentages must be correlated with absolute numbers for  clinical significance. %    Auto Lymphocyte %: 21.8 %    Auto Monocyte %: 5.4 %    Auto Eosinophil %: 2.4 %    Auto Basophil %: 0.7 %    Auto Immature Granulocyte %: 0.2 %    Nucleated RBC: 0 /100 WBCs
Complete Blood Count + Automated Diff in AM (06.11.19 @ 07:17)    WBC Count: 6.86 K/uL    RBC Count: 4.47 M/uL    Hemoglobin: 12.6 g/dL    Hematocrit: 39.5 %    Mean Cell Volume: 88.4 fL    Mean Cell Hemoglobin: 28.2 pg    Mean Cell Hemoglobin Conc: 31.9 g/dL    Red Cell Distrib Width: 13.9 %    Platelet Count - Automated: 296 K/uL    Auto Neutrophil #: 4.02 K/uL    Auto Lymphocyte #: 1.95 K/uL    Auto Monocyte #: 0.57 K/uL    Auto Eosinophil #: 0.24 K/uL    Auto Basophil #: 0.06 K/uL    Auto Neutrophil %: 58.6: Differential percentages must be correlated with absolute numbers for  clinical significance. %    Auto Lymphocyte %: 28.4 %    Auto Monocyte %: 8.3 %    Auto Eosinophil %: 3.5 %    Auto Basophil %: 0.9 %    Auto Immature Granulocyte %: 0.3 %    Nucleated RBC: 0 /100 WBCs
Complete Blood Count + Automated Diff in AM (07.02.19 @ 07:47)    WBC Count: 8.56 K/uL    RBC Count: 4.75 M/uL    Hemoglobin: 13.3 g/dL    Hematocrit: 42.1 %    Mean Cell Volume: 88.6 fL    Mean Cell Hemoglobin: 28.0 pg    Mean Cell Hemoglobin Conc: 31.6 g/dL    Red Cell Distrib Width: 14.2 %    Platelet Count - Automated: 367 K/uL    Auto Neutrophil #: 5.31 K/uL    Auto Lymphocyte #: 2.16 K/uL    Auto Monocyte #: 0.67 K/uL    Auto Eosinophil #: 0.36 K/uL    Auto Basophil #: 0.04 K/uL    Auto Neutrophil %: 62.1: Differential percentages must be correlated with absolute numbers for  clinical significance. %    Auto Lymphocyte %: 25.2 %    Auto Monocyte %: 7.8 %    Auto Eosinophil %: 4.2 %    Auto Basophil %: 0.5 %    Auto Immature Granulocyte %: 0.2 %    Nucleated RBC: 0 /100 WBCs
Complete Blood Count + Automated Diff in AM (06.04.19 @ 06:49)    WBC Count: 7.39 K/uL    RBC Count: 5.00 M/uL    Hemoglobin: 13.9 g/dL    Hematocrit: 44.2 %    Mean Cell Volume: 88.4 fL    Mean Cell Hemoglobin: 27.8 pg    Mean Cell Hemoglobin Conc: 31.4 g/dL    Red Cell Distrib Width: 13.8 %    Platelet Count - Automated: 317 K/uL    Auto Neutrophil #: 4.10 K/uL    Auto Lymphocyte #: 2.29 K/uL    Auto Monocyte #: 0.70 K/uL    Auto Eosinophil #: 0.22 K/uL    Auto Basophil #: 0.06 K/uL    Auto Neutrophil %: 55.4: Differential percentages must be correlated with absolute numbers for  clinical significance. %    Auto Lymphocyte %: 31.0 %    Auto Monocyte %: 9.5 %    Auto Eosinophil %: 3.0 %    Auto Basophil %: 0.8 %    Auto Immature Granulocyte %: 0.3 %    Nucleated RBC: 0 /100 WBCs
Complete Blood Count + Automated Diff in AM (06.26.19 @ 08:50)    WBC Count: 8.63 K/uL    RBC Count: 4.50 M/uL    Hemoglobin: 12.7 g/dL    Hematocrit: 40.6 %    Mean Cell Volume: 90.2 fL    Mean Cell Hemoglobin: 28.2 pg    Mean Cell Hemoglobin Conc: 31.3 g/dL    Red Cell Distrib Width: 14.5 %    Platelet Count - Automated: 360 K/uL    Auto Neutrophil #: 5.61 K/uL    Auto Lymphocyte #: 2.16 K/uL    Auto Monocyte #: 0.53 K/uL    Auto Eosinophil #: 0.27 K/uL    Auto Basophil #: 0.04 K/uL    Auto Neutrophil %: 65.1: Differential percentages must be correlated with absolute numbers for  clinical significance. %    Auto Lymphocyte %: 25.0 %    Auto Monocyte %: 6.1 %    Auto Eosinophil %: 3.1 %    Auto Basophil %: 0.5 %    Auto Immature Granulocyte %: 0.2 %    Nucleated RBC: 0 /100 WBCs

## 2019-07-09 NOTE — PROGRESS NOTE BEHAVIORAL HEALTH - NSBHCHARTREVIEWVS_PSY_A_CORE FT
ICU Vital Signs Last 24 Hrs  T(C): 36.5 (30 May 2019 05:52), Max: 36.9 (29 May 2019 16:15)  T(F): 97.7 (30 May 2019 05:52), Max: 98.5 (29 May 2019 16:15)  HR: 80 (30 May 2019 05:52) (80 - 80)  BP: 135/78 (30 May 2019 05:52) (135/78 - 135/78)  BP(mean): --  ABP: --  ABP(mean): --  RR: 19 (30 May 2019 05:52) (18 - 19)  SpO2: --
Vital Signs Last 24 Hrs  T(C): 36.1 (09 Jul 2019 09:36), Max: 36.8 (08 Jul 2019 19:43)  T(F): 96.9 (09 Jul 2019 09:36), Max: 98.2 (08 Jul 2019 19:43)  HR: 107 (09 Jul 2019 09:36) (80 - 107)  BP: 149/83 (09 Jul 2019 09:36) (117/66 - 149/83)  BP(mean): --  RR: 20 (09 Jul 2019 09:36) (18 - 20)  SpO2: --
Vital Signs Last 24 Hrs  T(C): 36.7 (05 Jun 2019 09:30), Max: 37 (05 Jun 2019 05:48)  T(F): 98 (05 Jun 2019 09:30), Max: 98.6 (05 Jun 2019 05:48)  HR: 110 (05 Jun 2019 09:30) (101 - 110)  BP: 118/82 (05 Jun 2019 09:30) (118/82 - 126/86)  BP(mean): --  RR: 16 (05 Jun 2019 09:30) (16 - 18)  SpO2: --
Vital Signs Last 24 Hrs  T(C): 36 (08 Jul 2019 06:01), Max: 36.8 (07 Jul 2019 16:27)  T(F): 96.8 (08 Jul 2019 06:01), Max: 98.2 (07 Jul 2019 16:27)  HR: 87 (08 Jul 2019 06:01) (87 - 101)  BP: 118/64 (08 Jul 2019 06:01) (118/64 - 135/88)  BP(mean): --  RR: 18 (08 Jul 2019 06:01) (18 - 18)  SpO2: --
ICU Vital Signs Last 24 Hrs  T(C): 36.7 (31 May 2019 06:09), Max: 36.7 (30 May 2019 17:52)  T(F): 98.1 (31 May 2019 06:09), Max: 98.1 (31 May 2019 06:09)  HR: 98 (31 May 2019 06:09) (98 - 113)  BP: 130/75 (31 May 2019 06:09) (127/97 - 130/75)  BP(mean): --  ABP: --  ABP(mean): --  RR: 18 (31 May 2019 06:09) (18 - 18)  SpO2: --
Vital Signs Last 24 Hrs  T(C): 35.9 (06 Jun 2019 08:55), Max: 36.7 (05 Jun 2019 09:30)  T(F): 96.7 (06 Jun 2019 08:55), Max: 98 (05 Jun 2019 09:30)  HR: 108 (06 Jun 2019 08:55) (90 - 110)  BP: 123/79 (06 Jun 2019 08:55) (118/82 - 140/93)  BP(mean): --  RR: 16 (06 Jun 2019 08:55) (16 - 20)  SpO2: --
ICU Vital Signs Last 24 Hrs  T(C): 36.6 (19 Jun 2019 07:55), Max: 36.6 (19 Jun 2019 07:55)  T(F): 97.9 (19 Jun 2019 07:55), Max: 97.9 (19 Jun 2019 07:55)  HR: 107 (19 Jun 2019 07:55) (89 - 107)  BP: 164/99 (19 Jun 2019 07:55) (100/58 - 164/99)  BP(mean): --  ABP: --  ABP(mean): --  RR: 18 (19 Jun 2019 07:55) (18 - 19)  SpO2: --
Vital Signs Last 24 Hrs  T(C): 37 (01 Jun 2019 10:01), Max: 37 (01 Jun 2019 10:01)  T(F): 98.6 (01 Jun 2019 10:01), Max: 98.6 (01 Jun 2019 10:01)  HR: 121 (01 Jun 2019 10:01) (99 - 121)  BP: 114/80 (01 Jun 2019 10:01) (114/80 - 143/88)  BP(mean): --  RR: 16 (01 Jun 2019 10:01) (16 - 18)  SpO2: --
ICU Vital Signs Last 24 Hrs  T(C): 35.8 (27 Jun 2019 05:52), Max: 35.8 (26 Jun 2019 17:30)  T(F): 96.5 (27 Jun 2019 05:52), Max: 96.5 (27 Jun 2019 05:52)  HR: 87 (27 Jun 2019 08:10) (84 - 89)  BP: 95/75 (27 Jun 2019 08:10) (95/75 - 138/73)  BP(mean): --  ABP: --  ABP(mean): --  RR: 16 (27 Jun 2019 08:10) (16 - 18)  SpO2: --
ICU Vital Signs Last 24 Hrs  T(C): 37.1 (28 Jun 2019 08:24), Max: 37.1 (28 Jun 2019 08:24)  T(F): 98.7 (28 Jun 2019 08:24), Max: 98.7 (28 Jun 2019 08:24)  HR: 100 (28 Jun 2019 08:24) (86 - 100)  BP: 136/91 (28 Jun 2019 08:24) (118/69 - 136/91)  BP(mean): --  ABP: --  ABP(mean): --  RR: 18 (28 Jun 2019 08:24) (16 - 18)  SpO2: --

## 2019-07-09 NOTE — PROGRESS NOTE BEHAVIORAL HEALTH - NSBHADMITIPDSM_PSY_A_CORE
see above for Axis I, II, III

## 2019-07-09 NOTE — PROGRESS NOTE BEHAVIORAL HEALTH - EYE CONTACT
Fair
Fair
Good
Good
Fair
Good/Other
Good
Fair
Good
Fair
Good
Good
Fair
Good
Fair
Good

## 2019-07-09 NOTE — PROGRESS NOTE BEHAVIORAL HEALTH - NSBHCONSORIP_PSY_A_CORE
Inpatient Admission...

## 2019-07-09 NOTE — PROGRESS NOTE BEHAVIORAL HEALTH - NS ED BHA AXIS I PRIMARY CODE FT
F20.0
F20.9
F25.8
F20.0
F20.9
F20.0
F20.9
F25.8
F20.9
F20.9
F20.0
F20.0
F20.9
F20.9
F20.0
F20.9
F20.0
F20.9
F20.0
F20.9
F20.0

## 2019-07-09 NOTE — PROGRESS NOTE BEHAVIORAL HEALTH - AFFECT RANGE
Constricted/Other
Constricted
Constricted/Other
Constricted/Other
Other/Constricted
Constricted
Constricted
Constricted/Other
Constricted
Constricted/Other
Other/Constricted
Constricted/Other
Constricted/Other
Constricted
Constricted
Constricted/Other

## 2019-07-09 NOTE — PROGRESS NOTE BEHAVIORAL HEALTH - NSBHATTESTNPTRAINEE_PSY_A_CORE
agree

## 2019-07-09 NOTE — PROGRESS NOTE BEHAVIORAL HEALTH - RISK ASSESSMENT
Pt is at chronic elevated risk due to hx of schizophrenia, multiple past admissions, poor insight and psychosis. Acute risk factors include questionable med adherence and recent aggressive behavior. Pt warrants continued IPP admission for safety and stabilization.

## 2019-07-09 NOTE — PROGRESS NOTE BEHAVIORAL HEALTH - NS ED BHA MSE SPEECH RATE
Slowed

## 2019-07-09 NOTE — PROGRESS NOTE BEHAVIORAL HEALTH - THOUGHT CONTENT
Unremarkable

## 2019-07-09 NOTE — PROGRESS NOTE BEHAVIORAL HEALTH - NSBHLEGALSTATUS_PSY_A_CORE
9.13 (Voluntary)
9.39 (Emergency)
9.13 (Voluntary)
9.39 (Emergency)
9.13 (Voluntary)
9.39 (Emergency)

## 2019-07-09 NOTE — PROGRESS NOTE BEHAVIORAL HEALTH - SUMMARY
48 yo  female with long hx of schizophrenia, DMII and HTN, transferred from [facility] to independent living in Jan 2019, referred self to ED for depression less than 24 hours after her roommate called 911 due to bizarre behavior.    Upon assessment, patient remains internally preoccupied, endorses AH, is isolative and flat, but has been compliant with medications and behaviorally stable. Most recent Clozaril levels from 6/11 was 262. Patient requires further titration. Continue to monitor treatment response. Will need to determine safe disposition plan. Anticipate for Wednesday 7/10.      #Schizophrenia  - continue with Clozaril taper (100 mg am, 400 mg qhs)    - Pt received Prolixin dec 37.5mg [id number] IM injection given on 6/28/19, next due on 7/12/19  - Clozaril levels pending     #Depressive symptoms, Insomnia  -c/w Remeron 7.5mg PO QHS  Ativan 1 mg PO or IM for severe anxiety    #Chronic Medical Issues   - c/w home medications

## 2019-07-09 NOTE — PROGRESS NOTE BEHAVIORAL HEALTH - DETAILS
diabetic

## 2019-07-10 ENCOUNTER — APPOINTMENT (OUTPATIENT)
Dept: ENDOCRINOLOGY | Facility: CLINIC | Age: 49
End: 2019-07-10

## 2019-07-10 VITALS
SYSTOLIC BLOOD PRESSURE: 143 MMHG | RESPIRATION RATE: 16 BRPM | TEMPERATURE: 98 F | HEART RATE: 89 BPM | DIASTOLIC BLOOD PRESSURE: 93 MMHG

## 2019-07-10 LAB
GLUCOSE BLDC GLUCOMTR-MCNC: 101 MG/DL — HIGH (ref 70–99)
GLUCOSE BLDC GLUCOMTR-MCNC: 146 MG/DL — HIGH (ref 70–99)

## 2019-07-10 PROCEDURE — 99238 HOSP IP/OBS DSCHRG MGMT 30/<: CPT

## 2019-07-10 RX ORDER — PIOGLITAZONE HYDROCHLORIDE 15 MG/1
1 TABLET ORAL
Qty: 30 | Refills: 0
Start: 2019-07-10 | End: 2019-08-08

## 2019-07-10 RX ORDER — DOCUSATE SODIUM 100 MG
1 CAPSULE ORAL
Qty: 30 | Refills: 0
Start: 2019-07-10

## 2019-07-10 RX ORDER — INSULIN LISPRO 100/ML
3 VIAL (ML) SUBCUTANEOUS
Qty: 30 | Refills: 0
Start: 2019-07-10 | End: 2019-08-08

## 2019-07-10 RX ORDER — INSULIN GLARGINE 100 [IU]/ML
25 INJECTION, SOLUTION SUBCUTANEOUS
Qty: 30 | Refills: 0
Start: 2019-07-10

## 2019-07-10 RX ORDER — ATORVASTATIN CALCIUM 80 MG/1
1 TABLET, FILM COATED ORAL
Qty: 30 | Refills: 0
Start: 2019-07-10

## 2019-07-10 RX ORDER — CLOZAPINE 150 MG/1
1 TABLET, ORALLY DISINTEGRATING ORAL
Qty: 7 | Refills: 0
Start: 2019-07-10

## 2019-07-10 RX ORDER — MIRTAZAPINE 45 MG/1
1 TABLET, ORALLY DISINTEGRATING ORAL
Qty: 30 | Refills: 0
Start: 2019-07-10

## 2019-07-10 RX ORDER — LOSARTAN POTASSIUM 100 MG/1
1 TABLET, FILM COATED ORAL
Qty: 30 | Refills: 0
Start: 2019-07-10 | End: 2019-08-08

## 2019-07-10 RX ORDER — CLOZAPINE 150 MG/1
2 TABLET, ORALLY DISINTEGRATING ORAL
Qty: 14 | Refills: 0
Start: 2019-07-10 | End: 2019-07-16

## 2019-07-10 RX ADMIN — Medication 3 UNIT(S): at 11:54

## 2019-07-10 RX ADMIN — Medication 3 UNIT(S): at 07:37

## 2019-07-10 RX ADMIN — CLOZAPINE 100 MILLIGRAM(S): 150 TABLET, ORALLY DISINTEGRATING ORAL at 08:34

## 2019-07-10 RX ADMIN — LOSARTAN POTASSIUM 50 MILLIGRAM(S): 100 TABLET, FILM COATED ORAL at 08:34

## 2019-07-10 RX ADMIN — PIOGLITAZONE HYDROCHLORIDE 30 MILLIGRAM(S): 15 TABLET ORAL at 08:34

## 2019-07-10 RX ADMIN — Medication 100 MILLIGRAM(S): at 08:34

## 2019-07-10 NOTE — DISCHARGE NOTE BEHAVIORAL HEALTH - NSBHDCMEDSFT_PSY_A_CORE
Patient was restarted on outpatient medication Clozaril at 25 mg po and over admission the dose was titrated up to 500 mg PO qd. Patient also given her Prolixin dec 25 mg every two weeks since being in the hospital, last dose given on 6/28/19. Patient had strong response to medications.

## 2019-07-10 NOTE — PROGRESS NOTE ADULT - PROBLEM SELECTOR PROBLEM 1
Schizoaffective disorder, chronic condition with acute exacerbation
Depression, unspecified depression type
Schizoaffective disorder, chronic condition with acute exacerbation
Type 2 diabetes mellitus with complication, with long-term current use of insulin

## 2019-07-10 NOTE — DISCHARGE NOTE BEHAVIORAL HEALTH - SECONDARY DIAGNOSIS.
Type 2 diabetes mellitus with complication, with long-term current use of insulin Essential hypertension Constipation, unspecified constipation type Hypercholesterolemia

## 2019-07-10 NOTE — DISCHARGE NOTE BEHAVIORAL HEALTH - HPI (INCLUDE ILLNESS QUALITY, SEVERITY, DURATION, TIMING, CONTEXT, MODIFYING FACTORS, ASSOCIATED SIGNS AND SYMPTOMS)
48 yo  female, single, domiciled at [facility], long hx of schizophrenia, multiple past [facility] admissions, PMH of DM II, HTN, referred self to ED for anxiety. Pt was seen in ED last night after her roommate called 911 because she "was acting weird" per ED chart. Pt was discharged home but returned to ED tonight because she felt anxious. Per ED team, pt appeared internally preoccupied, psych consulted for evaluation of psychosis.    Pt is a poor historian, internally preoccupied at times. Pt reports that she came to ED today because she "feel depressed" and has not been eating "in 2 days" because she feels depressed. When asked why pt came to ED last night, pt reports that she "felt depressed". Pt reports that she lives in [place], unable to provide more details about her housing except that she lives with a roommate. Pt reports that she has a  through [facility], Kianna Longo, who sees her 3 times a week and whom she last saw 2 days ago. Pt reports that she takes "over 15 medications" but does not remember their names, but reports that she is adherent with meds. Pt reports that she "doesn't know" her psychiatrist's name but saw them 1 month ago. Pt denies suicidal or homicidal ideation at this time. Pt denies hallucinations and no overt delusions elicited, but presents with marked poverty of thought and speech.    Collateral from pt's sister Sangeeta Robertson: Per pt's sister, pt has been in and out of [facility] for the past 15 years. Pt's sister reports that pt was transferred to independent living in Jan 2019. Pt's sister feels uncomfortable with pt living in independent living as the last time pt lived independently 4 years ago, she stopped taking her diabetes meds and was admitted for DKA. Pt's sister reports that she talks to pt daily. On Friday, pt did not call her sister, and pt's sister suspected pt was decompensating. Pt's sister contacted pt's roommate today, who told her pt was aggressive last night and went to ED. Pt's sister does not think pt is at her baseline, appears more withdrawn, constricted, and has been saying that she is depressed. Pt's sister does not think pt is able to care for herself independently and is concerned that she is not adherent with meds.

## 2019-07-10 NOTE — PROGRESS NOTE ADULT - ASSESSMENT
medically stable with no acute issues
medically stable   bp eelvated adn bs also elevated
medically stable with no acute issues
medically stable with no acute issues  fsbs all stable
medically stable with no acute issues  fsbs as noted
medically stable with no acute issues  fsbs as noted familia
medically stable with no acute issues  fsbs imoproved
medically stable with no acute issues  fsbs sree ng aduquate control
medically stable with no acute issues  sagar solares

## 2019-07-10 NOTE — PROGRESS NOTE ADULT - PROBLEM SELECTOR PLAN 2
bp stble on magan tx
bp stable noted low no sxs  decrese lostartan to 50 mg
bp stable on curren tx
bp stable on tx
bp stable on tx
bs well controlled on current tx regimen
continue present treatment as per psych plan as reviewed  Medically stable with no new changes in treatment  will continue to monitor medical status while being treated on psych
continue tx for diabetes with stable fsbs as reviwed
fsbs reviwed and stable olvin eastman adnmeds
fssbs as noted and cont tx  ntoed variable but stable
on insulin as was on home with oral med and fsbs stsable
will incresae dose of lantus and monitror
will ocntinue to monitror and use meal time coverage for now  may adjsut if remaisn elevated

## 2019-07-10 NOTE — DISCHARGE NOTE BEHAVIORAL HEALTH - FAMILY HISTORY OF PSYCHIATRIC ILLNESS
single, domiciled at  [facility] with [facility]mate. has a sister and niece living in .

## 2019-07-10 NOTE — PROGRESS NOTE ADULT - SUBJECTIVE AND OBJECTIVE BOX
pt stable alert in NAD  no new complaints    ACUTE PSYCHOSIS  ^ACUTE PSYCHOSIS  No pertinent family history in first degree relatives  Handoff  MEWS Score  Diabetes mellitus, type 2  Depression  Acute psychosis  Schizoaffective disorder, chronic condition with acute exacerbation  Essential hypertension  Lower respiratory infection  Depression, unspecified depression type  Type 2 diabetes mellitus with complication, with long-term current use of insulin  Acute psychosis  Paranoid schizophrenia  Schizophrenia  No significant past surgical history  MED EVAL  0    HEALTH ISSUES - PROBLEM Dx:  Schizoaffective disorder, chronic condition with acute exacerbation  Essential hypertension: Essential hypertension  Lower respiratory infection: Lower respiratory infection  Depression, unspecified depression type: Depression, unspecified depression type  Type 2 diabetes mellitus with complication, with long-term current use of insulin: Type 2 diabetes mellitus with complication, with long-term current use of insulin  Acute psychosis: Acute psychosis  Paranoid schizophrenia  Schizophrenia        PAST MEDICAL & SURGICAL HISTORY:  Diabetes mellitus, type 2  Depression  No significant past surgical history    penicillins (Unknown)      FAMILY HISTORY:  No pertinent family history in first degree relatives      acetaminophen   Tablet .. 650 milliGRAM(s) Oral every 6 hours PRN  atorvastatin 40 milliGRAM(s) Oral at bedtime  cloZAPine 100 milliGRAM(s) Oral <User Schedule>  cloZAPine 400 milliGRAM(s) Oral at bedtime  dextrose 40% Gel 15 Gram(s) Oral once PRN  dextrose 5%. 1000 milliLiter(s) IV Continuous <Continuous>  dextrose 50% Injectable 12.5 Gram(s) IV Push once  dextrose 50% Injectable 25 Gram(s) IV Push once  dextrose 50% Injectable 25 Gram(s) IV Push once  docusate sodium 100 milliGRAM(s) Oral daily  glucagon  Injectable 1 milliGRAM(s) IntraMuscular once PRN  haloperidol     Tablet 5 milliGRAM(s) Oral every 6 hours PRN  insulin glargine Injectable (LANTUS) 25 Unit(s) SubCutaneous at bedtime  insulin lispro (HumaLOG) corrective regimen sliding scale   SubCutaneous three times a day before meals  insulin lispro Injectable (HumaLOG) 3 Unit(s) SubCutaneous three times a day before meals  losartan 50 milliGRAM(s) Oral daily  mirtazapine 7.5 milliGRAM(s) Oral at bedtime  pioglitazone 30 milliGRAM(s) Oral daily      T(C): 36.9 (07-10-19 @ 05:50), Max: 36.9 (07-10-19 @ 05:50)  HR: 89 (07-10-19 @ 05:50) (89 - 107)  BP: 143/93 (07-10-19 @ 05:50) (119/91 - 149/83)  RR: 16 (07-10-19 @ 05:50) (16 - 20)  SpO2: --    PE;  general:  no acute cahnges lying in bed in nad    Lungs:    Heart:    EXT:    Neuro:  alert nodeficits      12.5  39.6  9.16  --  --  --  --                                        12.5   9.16  )-----------( 335      ( 09 Jul 2019 08:37 )             39.6       CAPILLARY BLOOD GLUCOSE      POCT Blood Glucose.: 101 mg/dL (10 Jul 2019 06:36)  POCT Blood Glucose.: 196 mg/dL (09 Jul 2019 20:13)  POCT Blood Glucose.: 158 mg/dL (09 Jul 2019 16:13)  POCT Blood Glucose.: 142 mg/dL (09 Jul 2019 10:56)
pt stable alert in NAD  no new complaints    ACUTE PSYCHOSIS  ^ACUTE PSYCHOSIS  No pertinent family history in first degree relatives  Handoff  MEWS Score  Diabetes mellitus, type 2  Depression  Acute psychosis  Essential hypertension  Lower respiratory infection  Depression, unspecified depression type  Type 2 diabetes mellitus with complication, with long-term current use of insulin  Acute psychosis  Paranoid schizophrenia  Schizophrenia  No significant past surgical history  MED EVAL  0    HEALTH ISSUES - PROBLEM Dx:  Essential hypertension: Essential hypertension  Lower respiratory infection: Lower respiratory infection  Depression, unspecified depression type: Depression, unspecified depression type  Type 2 diabetes mellitus with complication, with long-term current use of insulin: Type 2 diabetes mellitus with complication, with long-term current use of insulin  Acute psychosis: Acute psychosis  Paranoid schizophrenia  Schizophrenia        PAST MEDICAL & SURGICAL HISTORY:  Diabetes mellitus, type 2  Depression  No significant past surgical history    penicillins (Unknown)      FAMILY HISTORY:  No pertinent family history in first degree relatives      acetaminophen   Tablet .. 650 milliGRAM(s) Oral every 6 hours PRN  atorvastatin 40 milliGRAM(s) Oral at bedtime  cloZAPine 300 milliGRAM(s) Oral at bedtime  dextrose 40% Gel 15 Gram(s) Oral once PRN  dextrose 5%. 1000 milliLiter(s) IV Continuous <Continuous>  dextrose 50% Injectable 12.5 Gram(s) IV Push once  dextrose 50% Injectable 25 Gram(s) IV Push once  dextrose 50% Injectable 25 Gram(s) IV Push once  docusate sodium 100 milliGRAM(s) Oral daily  glucagon  Injectable 1 milliGRAM(s) IntraMuscular once PRN  haloperidol     Tablet 5 milliGRAM(s) Oral every 6 hours PRN  insulin glargine Injectable (LANTUS) 25 Unit(s) SubCutaneous at bedtime  insulin lispro (HumaLOG) corrective regimen sliding scale   SubCutaneous three times a day before meals  insulin lispro Injectable (HumaLOG) 3 Unit(s) SubCutaneous three times a day before meals  losartan 100 milliGRAM(s) Oral daily  mirtazapine 7.5 milliGRAM(s) Oral at bedtime  pioglitazone 30 milliGRAM(s) Oral daily      T(C): 36.1 (06-17-19 @ 07:34), Max: 36.3 (06-17-19 @ 05:44)  HR: 99 (06-17-19 @ 07:34) (98 - 100)  BP: 132/80 (06-17-19 @ 07:34) (120/76 - 132/80)  RR: 16 (06-17-19 @ 07:34) (16 - 18)  SpO2: --    PE;  general:  no cahgns from previos noted in nad    Lungs:    Heart:    EXT:    Neuro:  aelrt nod eficits                          CAPILLARY BLOOD GLUCOSE      POCT Blood Glucose.: 151 mg/dL (17 Jun 2019 06:37)  POCT Blood Glucose.: 206 mg/dL (16 Jun 2019 19:50)  POCT Blood Glucose.: 201 mg/dL (16 Jun 2019 16:04)  POCT Blood Glucose.: 160 mg/dL (16 Jun 2019 11:28)
pt stable alert in NAD  no new complaints    ACUTE PSYCHOSIS  ^ACUTE PSYCHOSIS  No pertinent family history in first degree relatives  Handoff  MEWS Score  Diabetes mellitus, type 2  Depression  Acute psychosis  Essential hypertension  Lower respiratory infection  Depression, unspecified depression type  Type 2 diabetes mellitus with complication, with long-term current use of insulin  Acute psychosis  Paranoid schizophrenia  Schizophrenia  No significant past surgical history  MED EVAL  0    HEALTH ISSUES - PROBLEM Dx:  Essential hypertension: Essential hypertension  Lower respiratory infection: Lower respiratory infection  Depression, unspecified depression type: Depression, unspecified depression type  Type 2 diabetes mellitus with complication, with long-term current use of insulin: Type 2 diabetes mellitus with complication, with long-term current use of insulin  Acute psychosis: Acute psychosis  Paranoid schizophrenia  Schizophrenia        PAST MEDICAL & SURGICAL HISTORY:  Diabetes mellitus, type 2  Depression  No significant past surgical history    penicillins (Unknown)      FAMILY HISTORY:  No pertinent family history in first degree relatives      acetaminophen   Tablet .. 650 milliGRAM(s) Oral every 6 hours PRN  atorvastatin 40 milliGRAM(s) Oral at bedtime  cloZAPine 325 milliGRAM(s) Oral at bedtime  dextrose 40% Gel 15 Gram(s) Oral once PRN  dextrose 5%. 1000 milliLiter(s) IV Continuous <Continuous>  dextrose 50% Injectable 12.5 Gram(s) IV Push once  dextrose 50% Injectable 25 Gram(s) IV Push once  dextrose 50% Injectable 25 Gram(s) IV Push once  docusate sodium 100 milliGRAM(s) Oral daily  glucagon  Injectable 1 milliGRAM(s) IntraMuscular once PRN  haloperidol     Tablet 5 milliGRAM(s) Oral every 6 hours PRN  insulin glargine Injectable (LANTUS) 25 Unit(s) SubCutaneous at bedtime  insulin lispro (HumaLOG) corrective regimen sliding scale   SubCutaneous three times a day before meals  insulin lispro Injectable (HumaLOG) 3 Unit(s) SubCutaneous three times a day before meals  losartan 100 milliGRAM(s) Oral daily  mirtazapine 7.5 milliGRAM(s) Oral at bedtime  pioglitazone 30 milliGRAM(s) Oral daily      T(C): 36.6 (06-19-19 @ 07:55), Max: 36.6 (06-19-19 @ 07:55)  HR: 107 (06-19-19 @ 07:55) (89 - 107)  BP: 164/99 (06-19-19 @ 07:55) (100/58 - 164/99)  RR: 18 (06-19-19 @ 07:55) (18 - 19)  SpO2: --    PE;  general:    no changes noted innad  Lungs:    Heart:    EXT:    Neuro:  no defciitrs noted                          CAPILLARY BLOOD GLUCOSE      POCT Blood Glucose.: 141 mg/dL (19 Jun 2019 06:45)  POCT Blood Glucose.: 175 mg/dL (18 Jun 2019 20:09)  POCT Blood Glucose.: 182 mg/dL (18 Jun 2019 15:50)  POCT Blood Glucose.: 159 mg/dL (18 Jun 2019 11:15)
pt stable alert in NAD  no new complaints    ACUTE PSYCHOSIS  ^ACUTE PSYCHOSIS  No pertinent family history in first degree relatives  Handoff  MEWS Score  Diabetes mellitus, type 2  Depression  Acute psychosis  Lower respiratory infection  Depression, unspecified depression type  Type 2 diabetes mellitus with complication, with long-term current use of insulin  Acute psychosis  Paranoid schizophrenia  Schizophrenia  No significant past surgical history  MED EVAL  0    HEALTH ISSUES - PROBLEM Dx:  Lower respiratory infection: Lower respiratory infection  Depression, unspecified depression type: Depression, unspecified depression type  Type 2 diabetes mellitus with complication, with long-term current use of insulin: Type 2 diabetes mellitus with complication, with long-term current use of insulin  Acute psychosis: Acute psychosis  Paranoid schizophrenia  Schizophrenia        PAST MEDICAL & SURGICAL HISTORY:  Diabetes mellitus, type 2  Depression  No significant past surgical history    penicillins (Unknown)      FAMILY HISTORY:  No pertinent family history in first degree relatives      acetaminophen   Tablet .. 650 milliGRAM(s) Oral every 6 hours PRN  atorvastatin 40 milliGRAM(s) Oral at bedtime  azithromycin   Tablet 500 milliGRAM(s) Oral daily  cloZAPine   Oral   cloZAPine 100 milliGRAM(s) Oral at bedtime  docusate sodium 100 milliGRAM(s) Oral daily  haloperidol     Tablet 5 milliGRAM(s) Oral every 6 hours PRN  insulin glargine Injectable (LANTUS) 20 Unit(s) SubCutaneous at bedtime  LORazepam     Tablet 2 milliGRAM(s) Oral every 6 hours PRN  losartan 50 milliGRAM(s) Oral daily  mirtazapine 7.5 milliGRAM(s) Oral at bedtime  pioglitazone 30 milliGRAM(s) Oral daily      T(C): 36.7 (05-31-19 @ 06:09), Max: 36.7 (05-30-19 @ 17:52)  HR: 98 (05-31-19 @ 06:09) (98 - 113)  BP: 130/75 (05-31-19 @ 06:09) (127/97 - 130/75)  RR: 18 (05-31-19 @ 06:09) (18 - 18)  SpO2: --    PE;  general:  no acute cahnges in nad    Lungs:    Heart:    EXT:    Neuro: no defiictits      --  --  --  13  0.8  3.9  133  13.9  43.5  15.77  11  1.1  4.1  153      05-30    143  |  111<H>  |  13  ----------------------------<  133<H>  3.9   |  18  |  0.8    Ca    9.9      30 May 2019 07:19    TPro  6.7  /  Alb  3.7  /  TBili  0.5  /  DBili  x   /  AST  28  /  ALT  14  /  AlkPhos  97  05-30      LIVER FUNCTIONS - ( 30 May 2019 07:19 )  Alb: 3.7 g/dL / Pro: 6.7 g/dL / ALK PHOS: 97 U/L / ALT: 14 U/L / AST: 28 U/L / GGT: x                 CAPILLARY BLOOD GLUCOSE      POCT Blood Glucose.: 90 mg/dL (31 May 2019 06:30)  POCT Blood Glucose.: 99 mg/dL (30 May 2019 20:20)  POCT Blood Glucose.: 139 mg/dL (30 May 2019 10:39)
pt stable alert in NAD  no new complaints    ACUTE PSYCHOSIS  ^ACUTE PSYCHOSIS  No pertinent family history in first degree relatives  Handoff  MEWS Score  Diabetes mellitus, type 2  Depression  Acute psychosis  Lower respiratory infection  Depression, unspecified depression type  Type 2 diabetes mellitus with complication, with long-term current use of insulin  Acute psychosis  Paranoid schizophrenia  Schizophrenia  No significant past surgical history  MED EVAL  0    HEALTH ISSUES - PROBLEM Dx:  Lower respiratory infection: Lower respiratory infection  Depression, unspecified depression type: Depression, unspecified depression type  Type 2 diabetes mellitus with complication, with long-term current use of insulin: Type 2 diabetes mellitus with complication, with long-term current use of insulin  Acute psychosis: Acute psychosis  Paranoid schizophrenia  Schizophrenia        PAST MEDICAL & SURGICAL HISTORY:  Diabetes mellitus, type 2  Depression  No significant past surgical history    penicillins (Unknown)      FAMILY HISTORY:  No pertinent family history in first degree relatives      acetaminophen   Tablet .. 650 milliGRAM(s) Oral every 6 hours PRN  atorvastatin 40 milliGRAM(s) Oral at bedtime  cloZAPine 175 milliGRAM(s) Oral at bedtime  docusate sodium 100 milliGRAM(s) Oral daily  haloperidol     Tablet 5 milliGRAM(s) Oral every 6 hours PRN  insulin glargine Injectable (LANTUS) 20 Unit(s) SubCutaneous at bedtime  LORazepam     Tablet 2 milliGRAM(s) Oral every 6 hours PRN  losartan 50 milliGRAM(s) Oral daily  mirtazapine 7.5 milliGRAM(s) Oral at bedtime  pioglitazone 30 milliGRAM(s) Oral daily      T(C): 37 (06-05-19 @ 05:48), Max: 37 (06-05-19 @ 05:48)  HR: 101 (06-05-19 @ 05:48) (101 - 104)  BP: 122/75 (06-05-19 @ 05:48) (122/75 - 126/86)  RR: 16 (06-05-19 @ 05:48) (16 - 18)  SpO2: --    PE;  general:  no acute changes in nad    Lungs:    Heart:    EXT:    Neuro:  aelrt nodefciits      13.9  44.2  7.39  --  --  --  --                                        13.9   7.39  )-----------( 317      ( 04 Jun 2019 06:49 )             44.2       CAPILLARY BLOOD GLUCOSE      POCT Blood Glucose.: 172 mg/dL (05 Jun 2019 06:36)  POCT Blood Glucose.: 212 mg/dL (04 Jun 2019 20:24)  POCT Blood Glucose.: 242 mg/dL (04 Jun 2019 15:52)
pt stable alert in NAD  no new complaints    ACUTE PSYCHOSIS  ^ACUTE PSYCHOSIS  No pertinent family history in first degree relatives  Handoff  MEWS Score  Diabetes mellitus, type 2  Depression  Acute psychosis  Lower respiratory infection  Depression, unspecified depression type  Type 2 diabetes mellitus with complication, with long-term current use of insulin  Acute psychosis  Paranoid schizophrenia  Schizophrenia  No significant past surgical history  MED EVAL  0    HEALTH ISSUES - PROBLEM Dx:  Lower respiratory infection: Lower respiratory infection  Depression, unspecified depression type: Depression, unspecified depression type  Type 2 diabetes mellitus with complication, with long-term current use of insulin: Type 2 diabetes mellitus with complication, with long-term current use of insulin  Acute psychosis: Acute psychosis  Paranoid schizophrenia  Schizophrenia        PAST MEDICAL & SURGICAL HISTORY:  Diabetes mellitus, type 2  Depression  No significant past surgical history    penicillins (Unknown)      FAMILY HISTORY:  No pertinent family history in first degree relatives      acetaminophen   Tablet .. 650 milliGRAM(s) Oral every 6 hours PRN  atorvastatin 40 milliGRAM(s) Oral at bedtime  cloZAPine 225 milliGRAM(s) Oral at bedtime  dextrose 40% Gel 15 Gram(s) Oral once PRN  dextrose 5%. 1000 milliLiter(s) IV Continuous <Continuous>  dextrose 50% Injectable 12.5 Gram(s) IV Push once  dextrose 50% Injectable 25 Gram(s) IV Push once  dextrose 50% Injectable 25 Gram(s) IV Push once  docusate sodium 100 milliGRAM(s) Oral daily  glucagon  Injectable 1 milliGRAM(s) IntraMuscular once PRN  haloperidol     Tablet 5 milliGRAM(s) Oral every 6 hours PRN  insulin glargine Injectable (LANTUS) 20 Unit(s) SubCutaneous at bedtime  insulin lispro (HumaLOG) corrective regimen sliding scale   SubCutaneous three times a day before meals  insulin lispro Injectable (HumaLOG) 3 Unit(s) SubCutaneous three times a day before meals  losartan 50 milliGRAM(s) Oral daily  mirtazapine 7.5 milliGRAM(s) Oral at bedtime  pioglitazone 30 milliGRAM(s) Oral daily      T(C): 36.7 (06-07-19 @ 06:26), Max: 36.9 (06-06-19 @ 17:51)  HR: 95 (06-07-19 @ 06:26) (95 - 114)  BP: 171/70 (06-07-19 @ 06:26) (123/79 - 171/70)  RR: 16 (06-07-19 @ 06:26) (16 - 18)  SpO2: --    PE;  general: no cahnges from previosu    Lungs:    Heart:    EXT:    no deficits  Neuro:                          CAPILLARY BLOOD GLUCOSE      POCT Blood Glucose.: 155 mg/dL (07 Jun 2019 06:48)  POCT Blood Glucose.: 204 mg/dL (06 Jun 2019 19:52)  POCT Blood Glucose.: 264 mg/dL (06 Jun 2019 16:04)  POCT Blood Glucose.: 226 mg/dL (06 Jun 2019 11:07)
pt stable alert in NAD  no new complaints    ACUTE PSYCHOSIS  ^ACUTE PSYCHOSIS  No pertinent family history in first degree relatives  Handoff  MEWS Score  Diabetes mellitus, type 2  Depression  Acute psychosis  Lower respiratory infection  Depression, unspecified depression type  Type 2 diabetes mellitus with complication, with long-term current use of insulin  Acute psychosis  Paranoid schizophrenia  Schizophrenia  No significant past surgical history  MED EVAL  0    HEALTH ISSUES - PROBLEM Dx:  Lower respiratory infection: Lower respiratory infection  Depression, unspecified depression type: Depression, unspecified depression type  Type 2 diabetes mellitus with complication, with long-term current use of insulin: Type 2 diabetes mellitus with complication, with long-term current use of insulin  Acute psychosis: Acute psychosis  Paranoid schizophrenia  Schizophrenia        PAST MEDICAL & SURGICAL HISTORY:  Diabetes mellitus, type 2  Depression  No significant past surgical history    penicillins (Unknown)      FAMILY HISTORY:  No pertinent family history in first degree relatives      acetaminophen   Tablet .. 650 milliGRAM(s) Oral every 6 hours PRN  atorvastatin 40 milliGRAM(s) Oral at bedtime  cloZAPine 250 milliGRAM(s) Oral at bedtime  dextrose 40% Gel 15 Gram(s) Oral once PRN  dextrose 5%. 1000 milliLiter(s) IV Continuous <Continuous>  dextrose 50% Injectable 12.5 Gram(s) IV Push once  dextrose 50% Injectable 25 Gram(s) IV Push once  dextrose 50% Injectable 25 Gram(s) IV Push once  docusate sodium 100 milliGRAM(s) Oral daily  glucagon  Injectable 1 milliGRAM(s) IntraMuscular once PRN  haloperidol     Tablet 5 milliGRAM(s) Oral every 6 hours PRN  insulin glargine Injectable (LANTUS) 20 Unit(s) SubCutaneous at bedtime  insulin lispro (HumaLOG) corrective regimen sliding scale   SubCutaneous three times a day before meals  insulin lispro Injectable (HumaLOG) 3 Unit(s) SubCutaneous three times a day before meals  losartan 50 milliGRAM(s) Oral daily  mirtazapine 7.5 milliGRAM(s) Oral at bedtime  pioglitazone 30 milliGRAM(s) Oral daily      T(C): 36.6 (06-11-19 @ 08:11), Max: 36.8 (06-11-19 @ 05:52)  HR: 100 (06-11-19 @ 08:11) (86 - 103)  BP: 165/95 (06-11-19 @ 08:11) (131/72 - 165/95)  RR: 17 (06-11-19 @ 08:11) (16 - 18)  SpO2: --    PE;  general:  no cahnges in nad    Lungs:    Heart:    EXT:    Neuro: no defiicts                          CAPILLARY BLOOD GLUCOSE      POCT Blood Glucose.: 139 mg/dL (11 Jun 2019 06:28)  POCT Blood Glucose.: 296 mg/dL (10 Bacilio 2019 20:07)  POCT Blood Glucose.: 213 mg/dL (10 Bacilio 2019 16:11)  POCT Blood Glucose.: 203 mg/dL (10 Bacilio 2019 11:46)
pt stable alert in NAD  no new complaints    ACUTE PSYCHOSIS  ^ACUTE PSYCHOSIS  No pertinent family history in first degree relatives  Handoff  MEWS Score  Diabetes mellitus, type 2  Depression  Acute psychosis  Lower respiratory infection  Depression, unspecified depression type  Type 2 diabetes mellitus with complication, with long-term current use of insulin  Acute psychosis  Paranoid schizophrenia  Schizophrenia  No significant past surgical history  MED EVAL  0    HEALTH ISSUES - PROBLEM Dx:  Lower respiratory infection: Lower respiratory infection  Depression, unspecified depression type: Depression, unspecified depression type  Type 2 diabetes mellitus with complication, with long-term current use of insulin: Type 2 diabetes mellitus with complication, with long-term current use of insulin  Acute psychosis: Acute psychosis  Paranoid schizophrenia  Schizophrenia        PAST MEDICAL & SURGICAL HISTORY:  Diabetes mellitus, type 2  Depression  No significant past surgical history    penicillins (Unknown)      FAMILY HISTORY:  No pertinent family history in first degree relatives      acetaminophen   Tablet .. 650 milliGRAM(s) Oral every 6 hours PRN  atorvastatin 40 milliGRAM(s) Oral at bedtime  docusate sodium 100 milliGRAM(s) Oral daily  haloperidol     Tablet 5 milliGRAM(s) Oral every 6 hours PRN  insulin glargine Injectable (LANTUS) 20 Unit(s) SubCutaneous at bedtime  LORazepam     Tablet 2 milliGRAM(s) Oral every 6 hours PRN  losartan 50 milliGRAM(s) Oral daily  mirtazapine 7.5 milliGRAM(s) Oral at bedtime  pioglitazone 30 milliGRAM(s) Oral daily      T(C): 35.7 (06-03-19 @ 06:15), Max: 37.1 (06-02-19 @ 17:13)  HR: 81 (06-03-19 @ 06:15) (81 - 113)  BP: 180/95 (06-03-19 @ 06:15) (117/76 - 180/95)  RR: 20 (06-03-19 @ 06:15) (18 - 20)  SpO2: --    PE;  general:  no cahnges noted from previos in nad    Lungs:    Heart:    EXT:    Neuro:  alert no deficits      --  --  --  11  0.8  4.1  87                      CAPILLARY BLOOD GLUCOSE      POCT Blood Glucose.: 100 mg/dL (03 Jun 2019 06:33)  POCT Blood Glucose.: 124 mg/dL (02 Jun 2019 21:12)
pt stable alert in NAD  no new complaints    ACUTE PSYCHOSIS  ^ACUTE PSYCHOSIS  No pertinent family history in first degree relatives  Handoff  MEWS Score  Diabetes mellitus, type 2  Depression  Acute psychosis  Schizoaffective disorder, chronic condition with acute exacerbation  Essential hypertension  Lower respiratory infection  Depression, unspecified depression type  Type 2 diabetes mellitus with complication, with long-term current use of insulin  Acute psychosis  Paranoid schizophrenia  Schizophrenia  No significant past surgical history  MED EVAL  0    HEALTH ISSUES - PROBLEM Dx:  Schizoaffective disorder, chronic condition with acute exacerbation  Essential hypertension: Essential hypertension  Lower respiratory infection: Lower respiratory infection  Depression, unspecified depression type: Depression, unspecified depression type  Type 2 diabetes mellitus with complication, with long-term current use of insulin: Type 2 diabetes mellitus with complication, with long-term current use of insulin  Acute psychosis: Acute psychosis  Paranoid schizophrenia  Schizophrenia        PAST MEDICAL & SURGICAL HISTORY:  Diabetes mellitus, type 2  Depression  No significant past surgical history    penicillins (Unknown)      FAMILY HISTORY:  No pertinent family history in first degree relatives      acetaminophen   Tablet .. 650 milliGRAM(s) Oral every 6 hours PRN  atorvastatin 40 milliGRAM(s) Oral at bedtime  cloZAPine 100 milliGRAM(s) Oral <User Schedule>  cloZAPine 400 milliGRAM(s) Oral at bedtime  dextrose 40% Gel 15 Gram(s) Oral once PRN  dextrose 5%. 1000 milliLiter(s) IV Continuous <Continuous>  dextrose 50% Injectable 12.5 Gram(s) IV Push once  dextrose 50% Injectable 25 Gram(s) IV Push once  dextrose 50% Injectable 25 Gram(s) IV Push once  docusate sodium 100 milliGRAM(s) Oral daily  glucagon  Injectable 1 milliGRAM(s) IntraMuscular once PRN  haloperidol     Tablet 5 milliGRAM(s) Oral every 6 hours PRN  insulin glargine Injectable (LANTUS) 25 Unit(s) SubCutaneous at bedtime  insulin lispro (HumaLOG) corrective regimen sliding scale   SubCutaneous three times a day before meals  insulin lispro Injectable (HumaLOG) 3 Unit(s) SubCutaneous three times a day before meals  losartan 50 milliGRAM(s) Oral daily  mirtazapine 7.5 milliGRAM(s) Oral at bedtime  pioglitazone 30 milliGRAM(s) Oral daily      T(C): 35.9 (07-07-19 @ 07:54), Max: 37.1 (07-06-19 @ 09:07)  HR: 104 (07-07-19 @ 07:54) (86 - 115)  BP: 130/94 (07-07-19 @ 07:54) (106/63 - 156/101)  RR: 18 (07-07-19 @ 07:54) (18 - 18)  SpO2: --    PE;  general:  no changes noted innad    Lungs:    Heart:    EXT:    Neuro: aelrt no deficits                          CAPILLARY BLOOD GLUCOSE      POCT Blood Glucose.: 115 mg/dL (07 Jul 2019 06:36)  POCT Blood Glucose.: 218 mg/dL (06 Jul 2019 20:28)  POCT Blood Glucose.: 139 mg/dL (06 Jul 2019 16:40)  POCT Blood Glucose.: 145 mg/dL (06 Jul 2019 11:19)
pt stable alert in NAD  no new complaints    ACUTE PSYCHOSIS  ^ACUTE PSYCHOSIS  No pertinent family history in first degree relatives  Handoff  MEWS Score  Diabetes mellitus, type 2  Depression  Acute psychosis  Schizoaffective disorder, chronic condition with acute exacerbation  Essential hypertension  Lower respiratory infection  Depression, unspecified depression type  Type 2 diabetes mellitus with complication, with long-term current use of insulin  Acute psychosis  Paranoid schizophrenia  Schizophrenia  No significant past surgical history  MED EVAL  0    HEALTH ISSUES - PROBLEM Dx:  Schizoaffective disorder, chronic condition with acute exacerbation  Essential hypertension: Essential hypertension  Lower respiratory infection: Lower respiratory infection  Depression, unspecified depression type: Depression, unspecified depression type  Type 2 diabetes mellitus with complication, with long-term current use of insulin: Type 2 diabetes mellitus with complication, with long-term current use of insulin  Acute psychosis: Acute psychosis  Paranoid schizophrenia  Schizophrenia        PAST MEDICAL & SURGICAL HISTORY:  Diabetes mellitus, type 2  Depression  No significant past surgical history    penicillins (Unknown)      FAMILY HISTORY:  No pertinent family history in first degree relatives      acetaminophen   Tablet .. 650 milliGRAM(s) Oral every 6 hours PRN  atorvastatin 40 milliGRAM(s) Oral at bedtime  cloZAPine 100 milliGRAM(s) Oral <User Schedule>  cloZAPine 400 milliGRAM(s) Oral at bedtime  dextrose 40% Gel 15 Gram(s) Oral once PRN  dextrose 5%. 1000 milliLiter(s) IV Continuous <Continuous>  dextrose 50% Injectable 12.5 Gram(s) IV Push once  dextrose 50% Injectable 25 Gram(s) IV Push once  dextrose 50% Injectable 25 Gram(s) IV Push once  docusate sodium 100 milliGRAM(s) Oral daily  glucagon  Injectable 1 milliGRAM(s) IntraMuscular once PRN  haloperidol     Tablet 5 milliGRAM(s) Oral every 6 hours PRN  insulin glargine Injectable (LANTUS) 25 Unit(s) SubCutaneous at bedtime  insulin lispro (HumaLOG) corrective regimen sliding scale   SubCutaneous three times a day before meals  insulin lispro Injectable (HumaLOG) 3 Unit(s) SubCutaneous three times a day before meals  losartan 50 milliGRAM(s) Oral daily  mirtazapine 7.5 milliGRAM(s) Oral at bedtime  pioglitazone 30 milliGRAM(s) Oral daily      T(C): 36.6 (07-03-19 @ 05:47), Max: 36.6 (07-03-19 @ 05:47)  HR: 88 (07-03-19 @ 05:47) (88 - 99)  BP: 137/77 (07-03-19 @ 05:47) (137/77 - 144/87)  RR: 20 (07-03-19 @ 05:47) (18 - 20)  SpO2: --    PE;  general: no cahgnes from previous  lying in bed    Lungs:    Heart:    EXT:    Neuro:  no deficits      13.3  42.1  8.56  --  --  --  --                                        13.3   8.56  )-----------( 367      ( 02 Jul 2019 07:47 )             42.1       CAPILLARY BLOOD GLUCOSE      POCT Blood Glucose.: 109 mg/dL (03 Jul 2019 06:36)  POCT Blood Glucose.: 130 mg/dL (02 Jul 2019 20:23)  POCT Blood Glucose.: 179 mg/dL (02 Jul 2019 15:53)  POCT Blood Glucose.: 138 mg/dL (02 Jul 2019 11:05)
pt stable alert in NAD  no new complaints    ACUTE PSYCHOSIS  ^ACUTE PSYCHOSIS  No pertinent family history in first degree relatives  Handoff  MEWS Score  Diabetes mellitus, type 2  Depression  Acute psychosis  Schizoaffective disorder, chronic condition with acute exacerbation  Essential hypertension  Lower respiratory infection  Depression, unspecified depression type  Type 2 diabetes mellitus with complication, with long-term current use of insulin  Acute psychosis  Paranoid schizophrenia  Schizophrenia  No significant past surgical history  MED EVAL  0    HEALTH ISSUES - PROBLEM Dx:  Schizoaffective disorder, chronic condition with acute exacerbation  Essential hypertension: Essential hypertension  Lower respiratory infection: Lower respiratory infection  Depression, unspecified depression type: Depression, unspecified depression type  Type 2 diabetes mellitus with complication, with long-term current use of insulin: Type 2 diabetes mellitus with complication, with long-term current use of insulin  Acute psychosis: Acute psychosis  Paranoid schizophrenia  Schizophrenia        PAST MEDICAL & SURGICAL HISTORY:  Diabetes mellitus, type 2  Depression  No significant past surgical history    penicillins (Unknown)      FAMILY HISTORY:  No pertinent family history in first degree relatives      acetaminophen   Tablet .. 650 milliGRAM(s) Oral every 6 hours PRN  atorvastatin 40 milliGRAM(s) Oral at bedtime  cloZAPine 100 milliGRAM(s) Oral <User Schedule>  cloZAPine 400 milliGRAM(s) Oral at bedtime  dextrose 40% Gel 15 Gram(s) Oral once PRN  dextrose 5%. 1000 milliLiter(s) IV Continuous <Continuous>  dextrose 50% Injectable 12.5 Gram(s) IV Push once  dextrose 50% Injectable 25 Gram(s) IV Push once  dextrose 50% Injectable 25 Gram(s) IV Push once  docusate sodium 100 milliGRAM(s) Oral daily  glucagon  Injectable 1 milliGRAM(s) IntraMuscular once PRN  haloperidol     Tablet 5 milliGRAM(s) Oral every 6 hours PRN  insulin glargine Injectable (LANTUS) 25 Unit(s) SubCutaneous at bedtime  insulin lispro (HumaLOG) corrective regimen sliding scale   SubCutaneous three times a day before meals  insulin lispro Injectable (HumaLOG) 3 Unit(s) SubCutaneous three times a day before meals  losartan 50 milliGRAM(s) Oral daily  mirtazapine 7.5 milliGRAM(s) Oral at bedtime  pioglitazone 30 milliGRAM(s) Oral daily      T(C): 36.8 (07-09-19 @ 06:16), Max: 36.8 (07-08-19 @ 19:43)  HR: 80 (07-09-19 @ 06:16) (80 - 104)  BP: 117/66 (07-09-19 @ 06:16) (117/66 - 130/76)  RR: 20 (07-09-19 @ 06:16) (18 - 20)  SpO2: --    PE;  general:  no changes from previosu noted    Lungs:    Heart:    EXT:    Neuro:  no defciits                          CAPILLARY BLOOD GLUCOSE      POCT Blood Glucose.: 151 mg/dL (09 Jul 2019 06:35)  POCT Blood Glucose.: 126 mg/dL (08 Jul 2019 19:56)  POCT Blood Glucose.: 214 mg/dL (08 Jul 2019 15:57)  POCT Blood Glucose.: 178 mg/dL (08 Jul 2019 11:13)
pt stable alert in NAD  no new complaints    ACUTE PSYCHOSIS  ^ACUTE PSYCHOSIS  No pertinent family history in first degree relatives  Handoff  MEWS Score  Diabetes mellitus, type 2  Depression  Acute psychosis  Schizoaffective disorder, chronic condition with acute exacerbation  Essential hypertension  Lower respiratory infection  Depression, unspecified depression type  Type 2 diabetes mellitus with complication, with long-term current use of insulin  Acute psychosis  Paranoid schizophrenia  Schizophrenia  No significant past surgical history  MED EVAL  0    HEALTH ISSUES - PROBLEM Dx:  Schizoaffective disorder, chronic condition with acute exacerbation  Essential hypertension: Essential hypertension  Lower respiratory infection: Lower respiratory infection  Depression, unspecified depression type: Depression, unspecified depression type  Type 2 diabetes mellitus with complication, with long-term current use of insulin: Type 2 diabetes mellitus with complication, with long-term current use of insulin  Acute psychosis: Acute psychosis  Paranoid schizophrenia  Schizophrenia        PAST MEDICAL & SURGICAL HISTORY:  Diabetes mellitus, type 2  Depression  No significant past surgical history    penicillins (Unknown)      FAMILY HISTORY:  No pertinent family history in first degree relatives      acetaminophen   Tablet .. 650 milliGRAM(s) Oral every 6 hours PRN  atorvastatin 40 milliGRAM(s) Oral at bedtime  cloZAPine 375 milliGRAM(s) Oral at bedtime  cloZAPine 100 milliGRAM(s) Oral <User Schedule>  dextrose 40% Gel 15 Gram(s) Oral once PRN  dextrose 5%. 1000 milliLiter(s) IV Continuous <Continuous>  dextrose 50% Injectable 12.5 Gram(s) IV Push once  dextrose 50% Injectable 25 Gram(s) IV Push once  dextrose 50% Injectable 25 Gram(s) IV Push once  docusate sodium 100 milliGRAM(s) Oral daily  glucagon  Injectable 1 milliGRAM(s) IntraMuscular once PRN  haloperidol     Tablet 5 milliGRAM(s) Oral every 6 hours PRN  insulin glargine Injectable (LANTUS) 25 Unit(s) SubCutaneous at bedtime  insulin lispro (HumaLOG) corrective regimen sliding scale   SubCutaneous three times a day before meals  insulin lispro Injectable (HumaLOG) 3 Unit(s) SubCutaneous three times a day before meals  LORazepam     Tablet 2 milliGRAM(s) Oral every 6 hours PRN  losartan 50 milliGRAM(s) Oral daily  mirtazapine 7.5 milliGRAM(s) Oral at bedtime  pioglitazone 30 milliGRAM(s) Oral daily      T(C): 36.8 (07-01-19 @ 05:39), Max: 36.8 (07-01-19 @ 05:39)  HR: 94 (07-01-19 @ 05:39) (94 - 112)  BP: 159/84 (07-01-19 @ 05:39) (117/80 - 159/84)  RR: 18 (07-01-19 @ 05:39) (16 - 18)  SpO2: --    PE;  general:  no cahgnes lying in bed in nad    Lungs:    Heart:    EXT:    Neuro:  alert no deficits                          CAPILLARY BLOOD GLUCOSE      POCT Blood Glucose.: 129 mg/dL (01 Jul 2019 06:33)  POCT Blood Glucose.: 250 mg/dL (30 Jun 2019 19:56)  POCT Blood Glucose.: 116 mg/dL (30 Jun 2019 15:56)  POCT Blood Glucose.: 237 mg/dL (30 Jun 2019 11:09)
pt stable alert in NAD  no new complaints    ACUTE PSYCHOSIS  ^ACUTE PSYCHOSIS  No pertinent family history in first degree relatives  Handoff  MEWS Score  Diabetes mellitus, type 2  Depression  Acute psychosis  Schizoaffective disorder, chronic condition with acute exacerbation  Essential hypertension  Lower respiratory infection  Depression, unspecified depression type  Type 2 diabetes mellitus with complication, with long-term current use of insulin  Acute psychosis  Paranoid schizophrenia  Schizophrenia  No significant past surgical history  MED EVAL  0    HEALTH ISSUES - PROBLEM Dx:  Schizoaffective disorder, chronic condition with acute exacerbation  Essential hypertension: Essential hypertension  Lower respiratory infection: Lower respiratory infection  Depression, unspecified depression type: Depression, unspecified depression type  Type 2 diabetes mellitus with complication, with long-term current use of insulin: Type 2 diabetes mellitus with complication, with long-term current use of insulin  Acute psychosis: Acute psychosis  Paranoid schizophrenia  Schizophrenia        PAST MEDICAL & SURGICAL HISTORY:  Diabetes mellitus, type 2  Depression  No significant past surgical history    penicillins (Unknown)      FAMILY HISTORY:  No pertinent family history in first degree relatives      acetaminophen   Tablet .. 650 milliGRAM(s) Oral every 6 hours PRN  atorvastatin 40 milliGRAM(s) Oral at bedtime  cloZAPine 375 milliGRAM(s) Oral at bedtime  cloZAPine 25 milliGRAM(s) Oral <User Schedule>  dextrose 40% Gel 15 Gram(s) Oral once PRN  dextrose 5%. 1000 milliLiter(s) IV Continuous <Continuous>  dextrose 50% Injectable 12.5 Gram(s) IV Push once  dextrose 50% Injectable 25 Gram(s) IV Push once  dextrose 50% Injectable 25 Gram(s) IV Push once  docusate sodium 100 milliGRAM(s) Oral daily  glucagon  Injectable 1 milliGRAM(s) IntraMuscular once PRN  haloperidol     Tablet 5 milliGRAM(s) Oral every 6 hours PRN  insulin glargine Injectable (LANTUS) 25 Unit(s) SubCutaneous at bedtime  insulin lispro (HumaLOG) corrective regimen sliding scale   SubCutaneous three times a day before meals  insulin lispro Injectable (HumaLOG) 3 Unit(s) SubCutaneous three times a day before meals  losartan 100 milliGRAM(s) Oral daily  mirtazapine 7.5 milliGRAM(s) Oral at bedtime  pioglitazone 30 milliGRAM(s) Oral daily      T(C): 35.7 (06-25-19 @ 08:07), Max: 36.7 (06-24-19 @ 18:44)  HR: 101 (06-25-19 @ 08:07) (96 - 101)  BP: 137/77 (06-25-19 @ 08:07) (112/54 - 137/77)  RR: 16 (06-25-19 @ 08:07) (16 - 18)  SpO2: --    PE;  general:  stable with no changes from previous in nad    Lungs:    Heart:    EXT:    Neuro:   alert no deficits                        CAPILLARY BLOOD GLUCOSE      POCT Blood Glucose.: 122 mg/dL (25 Jun 2019 06:29)  POCT Blood Glucose.: 160 mg/dL (24 Jun 2019 21:06)  POCT Blood Glucose.: 272 mg/dL (24 Jun 2019 16:19)  POCT Blood Glucose.: 171 mg/dL (24 Jun 2019 11:04)
pt stable alert in NAD  no new complaints    ACUTE PSYCHOSIS  ^ACUTE PSYCHOSIS  No pertinent family history in first degree relatives  Handoff  MEWS Score  Diabetes mellitus, type 2  Depression  Acute psychosis  Schizoaffective disorder, chronic condition with acute exacerbation  Essential hypertension  Lower respiratory infection  Depression, unspecified depression type  Type 2 diabetes mellitus with complication, with long-term current use of insulin  Acute psychosis  Paranoid schizophrenia  Schizophrenia  No significant past surgical history  MED EVAL  0    HEALTH ISSUES - PROBLEM Dx:  Schizoaffective disorder, chronic condition with acute exacerbation  Essential hypertension: Essential hypertension  Lower respiratory infection: Lower respiratory infection  Depression, unspecified depression type: Depression, unspecified depression type  Type 2 diabetes mellitus with complication, with long-term current use of insulin: Type 2 diabetes mellitus with complication, with long-term current use of insulin  Acute psychosis: Acute psychosis  Paranoid schizophrenia  Schizophrenia        PAST MEDICAL & SURGICAL HISTORY:  Diabetes mellitus, type 2  Depression  No significant past surgical history    penicillins (Unknown)      FAMILY HISTORY:  No pertinent family history in first degree relatives      acetaminophen   Tablet .. 650 milliGRAM(s) Oral every 6 hours PRN  atorvastatin 40 milliGRAM(s) Oral at bedtime  cloZAPine 375 milliGRAM(s) Oral at bedtime  cloZAPine 50 milliGRAM(s) Oral <User Schedule>  dextrose 40% Gel 15 Gram(s) Oral once PRN  dextrose 5%. 1000 milliLiter(s) IV Continuous <Continuous>  dextrose 50% Injectable 12.5 Gram(s) IV Push once  dextrose 50% Injectable 25 Gram(s) IV Push once  dextrose 50% Injectable 25 Gram(s) IV Push once  docusate sodium 100 milliGRAM(s) Oral daily  glucagon  Injectable 1 milliGRAM(s) IntraMuscular once PRN  haloperidol     Tablet 5 milliGRAM(s) Oral every 6 hours PRN  insulin glargine Injectable (LANTUS) 25 Unit(s) SubCutaneous at bedtime  insulin lispro (HumaLOG) corrective regimen sliding scale   SubCutaneous three times a day before meals  insulin lispro Injectable (HumaLOG) 3 Unit(s) SubCutaneous three times a day before meals  LORazepam     Tablet 2 milliGRAM(s) Oral every 6 hours PRN  losartan 100 milliGRAM(s) Oral daily  mirtazapine 7.5 milliGRAM(s) Oral at bedtime  pioglitazone 30 milliGRAM(s) Oral daily      T(C): 35.8 (06-27-19 @ 05:52), Max: 35.8 (06-26-19 @ 17:30)  HR: 87 (06-27-19 @ 08:10) (84 - 89)  BP: 95/75 (06-27-19 @ 08:10) (95/75 - 138/73)  RR: 16 (06-27-19 @ 08:10) (16 - 18)  SpO2: --    PE;  general:  stable no cahnges from previous in nad    Lungs:    Heart:    EXT:    Neuro:  aelrt no deficits      12.7  40.6  8.63  --  --  --  --                                        12.7   8.63  )-----------( 360      ( 26 Jun 2019 08:50 )             40.6       CAPILLARY BLOOD GLUCOSE      POCT Blood Glucose.: 114 mg/dL (27 Jun 2019 06:39)  POCT Blood Glucose.: 119 mg/dL (27 Jun 2019 06:27)  POCT Blood Glucose.: 129 mg/dL (26 Jun 2019 19:54)  POCT Blood Glucose.: 115 mg/dL (26 Jun 2019 16:06)  POCT Blood Glucose.: 266 mg/dL (26 Jun 2019 11:01)
pt stable alert in NAD  no new complaints  stable lying in bed in nad    ACUTE PSYCHOSIS  ^ACUTE PSYCHOSIS  No pertinent family history in first degree relatives  Handoff  MEWS Score  Diabetes mellitus, type 2  Depression  Acute psychosis  Essential hypertension  Lower respiratory infection  Depression, unspecified depression type  Type 2 diabetes mellitus with complication, with long-term current use of insulin  Acute psychosis  Paranoid schizophrenia  Schizophrenia  No significant past surgical history  MED EVAL  0    HEALTH ISSUES - PROBLEM Dx:  Essential hypertension: Essential hypertension  Lower respiratory infection: Lower respiratory infection  Depression, unspecified depression type: Depression, unspecified depression type  Type 2 diabetes mellitus with complication, with long-term current use of insulin: Type 2 diabetes mellitus with complication, with long-term current use of insulin  Acute psychosis: Acute psychosis  Paranoid schizophrenia  Schizophrenia        PAST MEDICAL & SURGICAL HISTORY:  Diabetes mellitus, type 2  Depression  No significant past surgical history    penicillins (Unknown)      FAMILY HISTORY:  No pertinent family history in first degree relatives      acetaminophen   Tablet .. 650 milliGRAM(s) Oral every 6 hours PRN  atorvastatin 40 milliGRAM(s) Oral at bedtime  cloZAPine 250 milliGRAM(s) Oral at bedtime  dextrose 40% Gel 15 Gram(s) Oral once PRN  dextrose 5%. 1000 milliLiter(s) IV Continuous <Continuous>  dextrose 50% Injectable 12.5 Gram(s) IV Push once  dextrose 50% Injectable 25 Gram(s) IV Push once  dextrose 50% Injectable 25 Gram(s) IV Push once  docusate sodium 100 milliGRAM(s) Oral daily  glucagon  Injectable 1 milliGRAM(s) IntraMuscular once PRN  haloperidol     Tablet 5 milliGRAM(s) Oral every 6 hours PRN  insulin glargine Injectable (LANTUS) 25 Unit(s) SubCutaneous at bedtime  insulin lispro (HumaLOG) corrective regimen sliding scale   SubCutaneous three times a day before meals  insulin lispro Injectable (HumaLOG) 3 Unit(s) SubCutaneous three times a day before meals  losartan 100 milliGRAM(s) Oral daily  mirtazapine 7.5 milliGRAM(s) Oral at bedtime  pioglitazone 30 milliGRAM(s) Oral daily      T(C): 36.3 (06-13-19 @ 05:34), Max: 36.3 (06-13-19 @ 05:34)  HR: 87 (06-13-19 @ 05:34) (87 - 96)  BP: 130/71 (06-13-19 @ 05:34) (117/60 - 130/71)  RR: 128 (06-13-19 @ 05:34) (18 - 128)  SpO2: --    PE;  general:  no acute cahnges in nad    Lungs:    Heart:    EXT:    Neuro:  alert no deficits      12.6  39.5  6.86  --  --  --  --                      CAPILLARY BLOOD GLUCOSE      POCT Blood Glucose.: 126 mg/dL (13 Jun 2019 06:27)  POCT Blood Glucose.: 190 mg/dL (12 Jun 2019 19:53)  POCT Blood Glucose.: 241 mg/dL (12 Jun 2019 15:56)  POCT Blood Glucose.: 197 mg/dL (12 Jun 2019 11:23)

## 2019-07-10 NOTE — PROGRESS NOTE ADULT - PROBLEM SELECTOR PROBLEM 2
Essential hypertension
Depression, unspecified depression type
Essential hypertension
Paranoid schizophrenia
Schizoaffective disorder, chronic condition with acute exacerbation
Type 2 diabetes mellitus with complication, with long-term current use of insulin

## 2019-07-10 NOTE — CHART NOTE - NSCHARTNOTEFT_GEN_A_CORE
Pt. is scheduled for discharge on this date.  She presents with reduced acute symptoms and continues to deny and suicidal or homicidal ideations.  Pt. will be returning to housing with [facility], however, she will be returning to a more structured setting with more supervision as per her , Kianna Agosto.  She will  continue mental health services on the grounds of [facility].  Pt. will not return to her former apartment, and will be transported to [address] on the grounds of [facility] ([place] [facility] residences).  Pt. presents as stable for discharge on this date.

## 2019-07-10 NOTE — PROGRESS NOTE ADULT - PROBLEM SELECTOR PROBLEM 3
Type 2 diabetes mellitus with complication, with long-term current use of insulin
Depression, unspecified depression type
Essential hypertension
Paranoid schizophrenia
Schizoaffective disorder, chronic condition with acute exacerbation
Type 2 diabetes mellitus with complication, with long-term current use of insulin

## 2019-07-10 NOTE — DISCHARGE NOTE BEHAVIORAL HEALTH - PAST PSYCHIATRIC HISTORY
1st psych admission in early 20s, long hx of schizophrenia, pt has been in and out of [facility] for the past 15 years. Pt's sister reports that pt was transferred to independent living in Jan 2019. Current OP psych Dr Coats

## 2019-07-10 NOTE — DISCHARGE NOTE BEHAVIORAL HEALTH - NSBHDCSWCOMMENTSFT_PSY_A_CORE
Discharge information faxed to the next level of care-[facility]-[phone]-on 7/10/19 at 2:30pm

## 2019-07-10 NOTE — PROGRESS NOTE ADULT - PROBLEM SELECTOR PLAN 3
fsbs stable cotn tx as ordered as oupt and f/u with pmd
bp eel;vlated today  previosu ok  will cont to monitor
bs acceptable as ntoed on current tx
continue present treatment as per psych plan as reviewed  Medically stable with no new changes in treatment  will continue to monitor medical status while being treated on psych
increase losratran to 100 mg
stable on current t x

## 2019-07-10 NOTE — DISCHARGE NOTE BEHAVIORAL HEALTH - NSBHDCTESTSFT_PSY_A_CORE
Clozapine Level, Serum (07.03.19 @ 04:30)    Clozapine Level, Serum: 428 ng/mL    Norclozapine Level, Serum: 236 ng/mL    Clozapine Total, Serum: 664: Plasma concentrations of clozapine plus norclozapine  (combined total) greater than 450 ng/mL have been associated  with therapeutic effect.  This test was developed and its performance characteristics  determined by [facility].  It has not been clearedor approved  by the Food and Drug Administration.  Analysis performed by [facility] [address] [place] [id number] - Anne-Marie Pablo Pharm D ng/mL

## 2019-07-10 NOTE — DISCHARGE NOTE BEHAVIORAL HEALTH - MEDICATION SUMMARY - MEDICATIONS TO TAKE
I will START or STAY ON the medications listed below when I get home from the hospital:    losartan 50 mg oral tablet  -- 1 tab(s) by mouth once a day until told by MD to stop  -- Indication: For Essential hypertension    mirtazapine 7.5 mg oral tablet  -- 1 tab(s) by mouth once a day (at bedtime) until told by MD to stop  -- Indication: For Schizophrenia    Lantus 100 units/mL subcutaneous solution  -- 25 unit(s) subcutaneous once a day (at bedtime)until told by MD to stop   -- Do not drink alcoholic beverages when taking this medication.  It is very important that you take or use this exactly as directed.  Do not skip doses or discontinue unless directed by your doctor.  Keep in refrigerator.  Do not freeze.    -- Indication: For Type 2 diabetes mellitus with complication, with long-term current use of insulin    pioglitazone 30 mg oral tablet  -- 1 tab(s) by mouth once a day until told by MD to stop  -- Indication: For Type 2 diabetes mellitus with complication, with long-term current use of insulin    insulin lispro (concentrated) 200 units/mL subcutaneous solution  -- 3 unit(s) subcutaneous 3 times a day until told by MD to stop  -- Indication: For Type 2 diabetes mellitus with complication, with long-term current use of insulin    atorvastatin 40 mg oral tablet  -- 1 tab(s) by mouth once a day (at bedtime) until told by MD to stop  -- Indication: For Hypercholesterolemia    cloZAPine 100 mg oral tablet  -- 1 tab(s) by mouth once a day until told by MD to stop  -- Indication: For Schizophrenia    cloZAPine 200 mg oral tablet  -- 2 tab(s) by mouth once a day (at bedtime) until told by MD to stop  -- Indication: For Schizophrenia    docusate sodium 100 mg oral capsule  -- 1 cap(s) by mouth once a day until told by MD to stop  -- Indication: For Constipation, unspecified constipation type

## 2019-07-15 DIAGNOSIS — Z88.0 ALLERGY STATUS TO PENICILLIN: ICD-10-CM

## 2019-07-15 DIAGNOSIS — R00.0 TACHYCARDIA, UNSPECIFIED: ICD-10-CM

## 2019-07-15 DIAGNOSIS — F41.9 ANXIETY DISORDER, UNSPECIFIED: ICD-10-CM

## 2019-07-15 DIAGNOSIS — F32.9 MAJOR DEPRESSIVE DISORDER, SINGLE EPISODE, UNSPECIFIED: ICD-10-CM

## 2019-07-15 DIAGNOSIS — Z91.128 PATIENT'S INTENTIONAL UNDERDOSING OF MEDICATION REGIMEN FOR OTHER REASON: ICD-10-CM

## 2019-07-15 DIAGNOSIS — F20.0 PARANOID SCHIZOPHRENIA: ICD-10-CM

## 2019-07-15 DIAGNOSIS — T42.4X6A UNDERDOSING OF BENZODIAZEPINES, INITIAL ENCOUNTER: ICD-10-CM

## 2019-07-15 DIAGNOSIS — T38.3X6A UNDERDOSING OF INSULIN AND ORAL HYPOGLYCEMIC [ANTIDIABETIC] DRUGS, INITIAL ENCOUNTER: ICD-10-CM

## 2019-07-15 DIAGNOSIS — Z79.4 LONG TERM (CURRENT) USE OF INSULIN: ICD-10-CM

## 2019-07-15 DIAGNOSIS — J22 UNSPECIFIED ACUTE LOWER RESPIRATORY INFECTION: ICD-10-CM

## 2019-07-15 DIAGNOSIS — I10 ESSENTIAL (PRIMARY) HYPERTENSION: ICD-10-CM

## 2019-07-15 DIAGNOSIS — F51.05 INSOMNIA DUE TO OTHER MENTAL DISORDER: ICD-10-CM

## 2019-07-15 DIAGNOSIS — E11.9 TYPE 2 DIABETES MELLITUS WITHOUT COMPLICATIONS: ICD-10-CM

## 2019-07-19 ENCOUNTER — EMERGENCY (EMERGENCY)
Facility: HOSPITAL | Age: 49
LOS: 0 days | Discharge: HOME | End: 2019-07-19
Attending: EMERGENCY MEDICINE | Admitting: EMERGENCY MEDICINE
Payer: MEDICARE

## 2019-07-19 VITALS
OXYGEN SATURATION: 100 % | RESPIRATION RATE: 14 BRPM | SYSTOLIC BLOOD PRESSURE: 143 MMHG | HEART RATE: 101 BPM | DIASTOLIC BLOOD PRESSURE: 77 MMHG | TEMPERATURE: 98 F

## 2019-07-19 VITALS
TEMPERATURE: 98 F | SYSTOLIC BLOOD PRESSURE: 128 MMHG | RESPIRATION RATE: 18 BRPM | DIASTOLIC BLOOD PRESSURE: 66 MMHG | HEART RATE: 97 BPM | OXYGEN SATURATION: 98 %

## 2019-07-19 DIAGNOSIS — E11.9 TYPE 2 DIABETES MELLITUS WITHOUT COMPLICATIONS: ICD-10-CM

## 2019-07-19 DIAGNOSIS — Z79.4 LONG TERM (CURRENT) USE OF INSULIN: ICD-10-CM

## 2019-07-19 DIAGNOSIS — Z79.899 OTHER LONG TERM (CURRENT) DRUG THERAPY: ICD-10-CM

## 2019-07-19 DIAGNOSIS — I10 ESSENTIAL (PRIMARY) HYPERTENSION: ICD-10-CM

## 2019-07-19 DIAGNOSIS — Z88.0 ALLERGY STATUS TO PENICILLIN: ICD-10-CM

## 2019-07-19 DIAGNOSIS — R69 ILLNESS, UNSPECIFIED: ICD-10-CM

## 2019-07-19 DIAGNOSIS — F99 MENTAL DISORDER, NOT OTHERWISE SPECIFIED: ICD-10-CM

## 2019-07-19 DIAGNOSIS — F20.9 SCHIZOPHRENIA, UNSPECIFIED: ICD-10-CM

## 2019-07-19 PROCEDURE — 90792 PSYCH DIAG EVAL W/MED SRVCS: CPT | Mod: GC

## 2019-07-19 PROCEDURE — 99283 EMERGENCY DEPT VISIT LOW MDM: CPT

## 2019-07-19 RX ORDER — FLUPHENAZINE HYDROCHLORIDE 1 MG/1
37.5 TABLET, FILM COATED ORAL ONCE
Refills: 0 | Status: COMPLETED | OUTPATIENT
Start: 2019-07-19 | End: 2019-07-19

## 2019-07-19 RX ADMIN — FLUPHENAZINE HYDROCHLORIDE 37.5 MILLIGRAM(S): 1 TABLET, FILM COATED ORAL at 16:12

## 2019-07-19 NOTE — ED BEHAVIORAL HEALTH ASSESSMENT NOTE - DESCRIPTION
Patient is calm and cooperative and remained in good behavioral control. T2DM, HTN 49 y o  female, domiciled at [facility] since discharge from [facility] 8 days ago, single, no children, reports college education at [employer], however, also reports current enrollment in GED classes

## 2019-07-19 NOTE — ED BEHAVIORAL HEALTH ASSESSMENT NOTE - SUICIDE PROTECTIVE FACTORS
Future oriented/Engaged in work or school/Identifies reasons for living/Supportive social network or family/Positive therapeutic relationships/Responsibility to family and others

## 2019-07-19 NOTE — ED PROVIDER NOTE - OBJECTIVE STATEMENT
Pt is a 49 y.o. F with PMH of DM, HLD HTN resident of [facility] sent for psych eval. Pt was refusing to take meds due to being sad, however she is in good moods now, no suicidal or homocidal ideation, no chest pain, no fever, compliant with drugs aside from today.

## 2019-07-19 NOTE — ED PROVIDER NOTE - NS ED ROS FT
Eyes:  No visual changes, eye pain or discharge.  ENMT:  No hearing changes, pain, no sore throat or runny nose, no difficulty swallowing  Cardiac:  No chest pain, SOB or edema. No chest pain with exertion.  Respiratory:  No cough or respiratory distress. No hemoptysis.  GI:  No nausea, vomiting, diarrhea or abdominal pain.  :  No dysuria, frequency or burning.  MS:  No myalgia, muscle weakness, joint pain or back pain.  Neuro:  No headache or weakness.  No LOC.  Skin:  No skin rash.

## 2019-07-19 NOTE — ED ADULT NURSE NOTE - CHIEF COMPLAINT QUOTE
from [address] for change in mental status. As per staff , pt wasn't responding to staff. pt hasn't been taking her morning meds at the facility

## 2019-07-19 NOTE — ED BEHAVIORAL HEALTH ASSESSMENT NOTE - MEDICATIONS (PRESCRIPTIONS, DIRECTIONS)
in the ED: -please give patient Prolixin 37.5mg IM, as she missed her dose due on 7/12/19; at [facility]: continue Mirtazapine 7.5 mg PO qdaily, Clozaril 100mg PO qAM and 200mg qhs, Prolixin 37.5mg [id number], next dose due on 8/2/19

## 2019-07-19 NOTE — ED ADULT NURSE NOTE - OBJECTIVE STATEMENT
Pt with EMS send for further evaluation , as per EMS noted not  responding  as usual very sleepy ,pt does not know what Happened. pt A/O times 3 ambulate steady FS- 150 lunch tray provide ED attending aware

## 2019-07-19 NOTE — ED BEHAVIORAL HEALTH ASSESSMENT NOTE - OTHER PAST PSYCHIATRIC HISTORY (INCLUDE DETAILS REGARDING ONSET, COURSE OF ILLNESS, INPATIENT/OUTPATIENT TREATMENT)
Patient has chronic history of schizophrenia, with past IPP admissions, and decade long history of being in and out of [place] [facility].  Most recently discharged from [facility], admitted from 6/29/19-7/10/19, for decompensation of schizophrenia shortly after being discharged to [facility] in 1/19.  Follows with NP Zeenat Anaya and therapist Jairo Hennessy and is sporadically compliant with medications.

## 2019-07-19 NOTE — ED BEHAVIORAL HEALTH ASSESSMENT NOTE - CURRENT MEDICATION
Mirtazapine 7.5 mg PO qdaily  Clozaril 100mg PO qAM and 200mg PO qhs   Prolixin Dec 37.5mg [id number], last given on 6/28/19 (was due for dose on 7/12/19, which was not given)

## 2019-07-19 NOTE — ED BEHAVIORAL HEALTH ASSESSMENT NOTE - DETAILS
patient discharged from [facility] I[facility] on 7/10/19. Admitted for decompensation of chronic schizophrenia, from 5/29/19-7/10/19 spoke to Dr. Nicholson Reports depression in grandfather and aunt discussed disposition plan with Senior , Landy Silva

## 2019-07-19 NOTE — ED PROVIDER NOTE - PROGRESS NOTE DETAILS
Pt ate tray of food without issue. Psych at bedside for eval. Seen by psych. Pt may have missed prolixin decanoate, will confirm and give as needed, otherwise cleared for d/c back to [facility]. 49 y.o. HTN HLD, resident at [facility] sent in for refusal to take meds, Feeling sad and did not want to take meds, but feels better now, no homocidal or suicidal ideation, no pain, no drug use.   pt is nontoxic appearing and cooperative, lungs CTA, CVS1S2 RRR abd obese, NT. Will speak with psych, reassess. Psych bedside, awaiting disposition Fluphenazine given 37.5, repeat in 2 weeks, will follow up with Psychiatrist. Pt to be discharged after medication. Pt agrees with plan.

## 2019-07-19 NOTE — ED PROVIDER NOTE - ATTENDING CONTRIBUTION TO CARE
Attending Statement: I have personally seen and examined this patient. I have fully participated in the care of this patient. I have reviewed all pertinent clinical information, including history, physical exam, plan and the Medical Student's and Resident's note and agree except as noted.    48yo woman h/o HTN, DM, HLD, recent psych admission currently living at [facility] was sent for psych evaluation due to refusal to take meds this morning. Pt reports that she was feeling sad and didn't want to take meds, but is feeling better now. No other complaints, no SI, HI. On exam VS as noted, pt is nontoxic appearing and cooperative, lungs CTA, CVS1S2 RRR abd obese, NT. Will speak with psych, reassess.

## 2019-07-19 NOTE — ED PROVIDER NOTE - NSFOLLOWUPINSTRUCTIONS_ED_ALL_ED_FT
Psychosis  Psychosis, also called thought disturbance, refers to a severe loss of contact with reality. People having a psychotic episode are not able to think clearly, and their emotions and responses do not match with what is actually happening. People having a psychotic episode may have false beliefs about what is happening or who they are (delusions). They may see, hear, taste, smell, or feel things that are not present (hallucinations). They may also be very upset (agitated), have chaotic behavior, or be very quiet and withdrawn.    What are the causes?  This condition may be caused by:  Very serious mental health (psychiatric) conditions such as schizophrenia, bipolar disorder, or major depression.  Use of drugs such as hallucinogens or alcohol.  Medical conditions such as delirium or neurological disorders.  What are the signs or symptoms?  Symptoms of this condition include:  Delusions, such as:  Feeling a lot of fear or suspicion (paranoia).  Believing something that is odd, unrealistic, or false, such as believing that you are someone else.  Hallucinations, such as:  Hearing or seeing things, smelling odors, experiencing tastes, or feeling bodily sensations.  Command hallucinations that direct you to do something that could be dangerous.  Disorganized thinking, such as thoughts that jump from one idea to another in a way that does not make sense.  Disorganized speech, such as saying things that do not make sense, echoing others, or using words based on their sound rather than their meaning.  Inappropriate behavior, such as talking to yourself, showing a clear increase or decrease in activity, or intruding on unfamiliar people.  How is this diagnosed?  ImageThis condition is diagnosed based on an assessment by a health care provider.  The health care provider may ask questions about:  Your thoughts, feelings, and behavior.  Any medical conditions you have.  Any use of alcohol or drugs.  One or more of the following may also be done:  A physical exam.  Blood tests.  Brain imaging, such as a CT scan or MRI.  A brain wave study (electroencephalogram, or EEG).  The health care provider may refer you to a mental health professional for further tests.    How is this treated?  ImageTreatment for this condition may depend on the cause of the psychosis. Treatment may include one or more of the following:  Supportive care and monitoring in the emergency room or hospital. You may need to stay in the hospital if you are a danger to yourself or others.  Taking antipsychotic medicines to reduce symptoms and to balance chemicals in the brain.  Treating an underlying medical condition.  Stopping or reducing drugs that are causing psychosis.  Therapy and other supportive programs, such as:  Ongoing treatment and care from a mental health professional.  Individual or family therapy.  Training to learn new skills to cope with the psychosis and prevent further episodes.  Follow these instructions at home:  Take over-the-counter and prescription medicines only as told by your health care provider.  Consult a health care provider before taking over-the-counter medicines, herbs, or supplements.  Surround yourself with people who care about you and can help manage your condition.  Keep stress under control. Stress may trigger psychosis and make symptoms worse.  Maintain a healthy lifestyle. This includes:  Eating a healthy diet.  Getting enough sleep.  Exercising regularly.  Avoiding alcohol, nicotine, and recreational drugs.  Keep all follow-up visits as told by your health care provider. This is important.  Contact a health care provider if:  Medicines do not seem to be helping.  You or others notice that you:  Continue to see, smell, or feel things that are not there.  Hear voices telling you to do things.  Feel extremely fearful and suspicious that someone or something will harm you.  Feel unable to leave your house.  Have trouble taking care of yourself.  You have side effects of medicines, such as:  Changes in sleep patterns.  Dizziness.  Weight gain.  Restlessness.  Movement changes.  Shaking that you cannot control (tremors).  Get help right away if:  You have serious side effects of medicine, such as:  Swelling of the face, lips, tongue, or throat.  Fever, confusion, muscle spasms, or seizures.  You have serious thoughts about harming yourself or hurting others.  If you ever feel like you may hurt yourself or others, or have thoughts about taking your own life, get help right away. You can go to your nearest emergency department or call:   Your local emergency services (911 in the [place]   A suicide crisis helpline, such as the National Suicide Prevention Lifeline at 1-855.655.9274. This is open 24 hours a day.   Summary  Psychosis refers to a severe loss of contact with reality. People having a psychotic episode are not able to think clearly, and they may have delusions or hallucinations.  Psychosis is a serious medical condition that should be treated by a medical professional as soon as possible. Being checked and treated right away can stop or reduce symptoms. This prevents more serious problems from developing.  In some cases, treatment may include taking antipsychotic medicines to reduce symptoms and to balance chemicals in the brain.  Support programs may help you learn new skills to cope with the psychosis and prevent further episodes.  This information is not intended to replace advice given to you by your health care provider. Make sure you discuss any questions you have with your health care provider.

## 2019-07-19 NOTE — ED BEHAVIORAL HEALTH ASSESSMENT NOTE - PSYCHIATRIC RESIDENCE
Chief Complaint   Patient presents with    Well Child     11 years     Allergy papers for school are with mother. Patient has a sore throat x week and stuffiness. Patient is starting 6th grade and needs a sports physical form. Also needs a refill on epi-pen. [facility]

## 2019-07-19 NOTE — ED BEHAVIORAL HEALTH ASSESSMENT NOTE - CASE SUMMARY
Ms Carter is a 49 year old woman with a history of schizophrenia who presented to the Ed after she was found not responsive at her residence by staff to rule out hypoglycemia.   Psychiatric consult was called for a mental health evaluation. Patient's auditory hallucinations are considered chronic , and non distressing. She resides in a structured setting although it is not a locked unit , the staff monitor their clients for decompensation of psychiatric illness. She does not appear acutely psychotic , manic ore depressed nor does she have suicidal ideations, intent or plan despite her partial compliance with medication.  At this time, patient is not considered a danger to herself or others and does not need inpatient psychiatric hospitalization. Patient can be given her missed dose of Prolixin decanoate I.M 37.5 X 1 dose  and afterwards discharged to her residence upon medical clearance. patient will be following up with her outpatient Psychiatrist Shanda Stern NP on Mondays for medication management.

## 2019-07-19 NOTE — ED ADULT NURSE NOTE - NSIMPLEMENTINTERV_GEN_ALL_ED
Implemented All Fall with Harm Risk Interventions:  [place] to call system. Call bell, personal items and telephone within reach. Instruct patient to call for assistance. Room bathroom lighting operational. Non-slip footwear when patient is off stretcher. Physically safe environment: no spills, clutter or unnecessary equipment. Stretcher in lowest position, wheels locked, appropriate side rails in place. Provide visual cue, wrist band, yellow gown, etc. Monitor gait and stability. Monitor for mental status changes and reorient to person, place, and time. Review medications for side effects contributing to fall risk. Reinforce activity limits and safety measures with patient and family. Provide visual clues: red socks.

## 2019-07-19 NOTE — ED BEHAVIORAL HEALTH ASSESSMENT NOTE - SUMMARY
Patient is a 49 y o  female, domiciled at [facility], single, no children, unemployed, with past medical history of Type II Diabetes Mellitus, HTN, past psychiatric history of schizophrenia, multiple prior IPP admissions (most recently from 5/29/19-7/10/19 for decompensation of schizophrenia, no history of suicidality, was brought in by ambulance from residence after an episode of unresponsiveness (which was apparently volitional), which was concerning to staff for hypoglycemic episode; also reported sporadic compliance with AM medications.  Psychiatry consult was placed for psychiatric evaluation.    On evaluation, patient is calm and cooperative on interview, and is not acutely psychotic, manic, depressed, suicidal or homicidal, and does not currently warrant involuntary inpatient psychiatric admission at this time.  Patient does endorse auditory hallucinations, however, she reports that they are not bothersome to her, are not command in nature, are not putting her or others at risk, and are chronic in nature.   Patient is provided psychoeducation about importance of medication compliance and reports that she will be compliant with her twice daily regimen upon return to [facility], where both she and staff report good outcomes, with psychiatric follow up scheduled for 7/22/19 with NP Zeenat Stern.     Recommendations:   -no acute psychiatric interventions at this time  -please give patient Prolixin 37.5mg IM, as she missed her dose due on 7/12/19  -f/u with outpatient psychiatry NP, Zeenat Stern, on 7/22/19 at 10AM

## 2019-07-19 NOTE — ED BEHAVIORAL HEALTH ASSESSMENT NOTE - HPI (INCLUDE ILLNESS QUALITY, SEVERITY, DURATION, TIMING, CONTEXT, MODIFYING FACTORS, ASSOCIATED SIGNS AND SYMPTOMS)
Patient is a 49 y o  female, Patient is a 49 y o  female, domiciled at [facility], single, no children, unemployed, with past medical history of Type II Diabetes Mellitus, HTN, past psychiatric history of schizophrenia, multiple prior IPP admissions (most recently from 5/29/19-7/10/19 for decompensation of schizophrenia, no history of suicidality, was brought in by ambulance from residence after an episode of unresponsiveness (which was apparently volitional), which was concerning to staff for hypoglycemic episode; also reported sporadic compliance with AM medications.  Psychiatry consult was placed for psychiatric evaluation.    On approach, patient is sitting in recliner, calm and cooperative with interview.  She reports that since discharge from [facility], she has been feeling well and acclimating to her new housing situation.  Reports that she had made friends, whom she goes shopping with, is taking a GED course once a week and is social on the unit.  Reports that she is compliant with her night medications, however, does not take her morning medications sometimes "because I am in my room relaxing and probably because I need Cogentin, because my eyes go up and down. Should I get some Cogentin?"  Patient is agreeable to discuss Cogentin initiation with outpatient psychiatrist.  Patient is provided with psychoeducation regarding the importance of taking her medications as prescribed.  Patient does not recall receiving Prolixin injection (due 7/12/19) since being discharged from [facility].  She reports that she was not responding to staff earlier in the day "because I was laying there relaxing and thinking and concentration and the voices told me to lay still."  She reports that the voices are not bothersome to her and that they are not telling her to harm or kill herself or others nor that she is having thoughts of same.  Reports that her mood is "happy" since being discharged and she enjoys spending time with her friends and family.  Denies any other symptoms of psychosis, param, depression and expressed interested in being discharged back to [facility].      As per staff at [facility], since patient's discharge from [facility], she has been doing well at the residence.  She has been social, reconnected with her old roommate, does not appear internally preoccupied, and is pleasant with other residents, involved.  Patient, however, is only sporadically complaint with her morning medications.  She takes her medications nightly, however.  Prior to presentation to the ED, patient was found seemingly unresponsive, where she was not arousable and was not answering staff.  Staff became concerned for hypoglecmia, given history of Diabetes and poor medication compliance and called 911.  Report that their primary concern for sending patient to the ED was medical.  Report that patient's has not yet followed up with her therapist, Jairo Hennessy, nor psychiatric NP, Zeenat Stern, since discharge from [facility], nor has she received her Prolixin shot that was due on 7/12/19.  They are agreeable to patient's return to the residence once medically cleared, with follow up appointment scheduled with ANA MARÍA Stern on Monday 7/22/19 at 10AM.

## 2019-07-19 NOTE — ED PROVIDER NOTE - PHYSICAL EXAMINATION
CONSTITUTIONAL:  Obese, malodorous, ; in no acute distress. Sitting up and providing appropriate history and physical examination  SKIN: skin exam is warm and dry, no acute rash.  HEAD: Normocephalic; atraumatic.  EYES: PERRL, 3 mm bilateral, no nystagmus, EOM intact; conjunctiva and sclera clear.  ENT: No nasal discharge; airway clear.  NECK: Supple; non tender. + full passive ROM in all directions. No JVD  CARD: S1, S2 normal; no murmurs, gallops, or rubs. Regular rate and rhythm. + Symmetric Strong Pulses  RESP: No wheezes, rales or rhonchi. Good air movement bilaterally  ABD: soft; non-distended; non-tender. No Rebound, No Guarding, No signs of peritonitis, No CVA tenderness. No pulsatile abdominal mass. + Strong and Symmetric Pulses  EXT: Normal ROM. No clubbing, cyanosis or edema. Dp and Pt Pulses intact. Cap refill less than 3 seconds  NEURO: CN 2-12 intact, normal finger to nose, normal romberg, stable gait, no sensory or motor deficits, Alert, oriented, grossly unremarkable. No Focal deficits. GCS 15. NIH 0  PSYCH: Cooperative, appropriate, flat affect

## 2019-07-19 NOTE — ED BEHAVIORAL HEALTH ASSESSMENT NOTE - OTHER
[facility] residents &  at [facility] auditory hallucinations

## 2019-07-19 NOTE — ED BEHAVIORAL HEALTH ASSESSMENT NOTE - SAFETY PLAN DETAILS
patient reports that if she should have thoughts of harming herself or others, she will reports back to the ED, tell staff at residence of call 911

## 2019-07-19 NOTE — ED BEHAVIORAL HEALTH ASSESSMENT NOTE - RISK ASSESSMENT
Patient has chronicn elevated risk of self harm due to chronic auditroy hallucinations and sporadic medication compliance, however, risk is mitigated by lack of previous suicide attempts, lack of suicidal ideation/intent/plan currently, reported willingness to comply with medications upon discharge and ability to engage in safety planning.  Patient is thus not at imminent risk for self harm and does not warrant inpatient psychiatric hospitalization at this time.

## 2019-07-23 ENCOUNTER — EMERGENCY (EMERGENCY)
Facility: HOSPITAL | Age: 49
LOS: 0 days | Discharge: HOME | End: 2019-07-23
Attending: EMERGENCY MEDICINE | Admitting: EMERGENCY MEDICINE
Payer: MEDICARE

## 2019-07-23 VITALS
HEART RATE: 96 BPM | TEMPERATURE: 99 F | SYSTOLIC BLOOD PRESSURE: 127 MMHG | OXYGEN SATURATION: 100 % | DIASTOLIC BLOOD PRESSURE: 87 MMHG | RESPIRATION RATE: 20 BRPM

## 2019-07-23 VITALS
DIASTOLIC BLOOD PRESSURE: 71 MMHG | OXYGEN SATURATION: 97 % | RESPIRATION RATE: 18 BRPM | TEMPERATURE: 99 F | HEART RATE: 93 BPM | SYSTOLIC BLOOD PRESSURE: 141 MMHG

## 2019-07-23 DIAGNOSIS — Z88.0 ALLERGY STATUS TO PENICILLIN: ICD-10-CM

## 2019-07-23 DIAGNOSIS — F17.210 NICOTINE DEPENDENCE, CIGARETTES, UNCOMPLICATED: ICD-10-CM

## 2019-07-23 DIAGNOSIS — F32.9 MAJOR DEPRESSIVE DISORDER, SINGLE EPISODE, UNSPECIFIED: ICD-10-CM

## 2019-07-23 DIAGNOSIS — Z79.4 LONG TERM (CURRENT) USE OF INSULIN: ICD-10-CM

## 2019-07-23 DIAGNOSIS — E11.9 TYPE 2 DIABETES MELLITUS WITHOUT COMPLICATIONS: ICD-10-CM

## 2019-07-23 DIAGNOSIS — Z79.899 OTHER LONG TERM (CURRENT) DRUG THERAPY: ICD-10-CM

## 2019-07-23 DIAGNOSIS — F20.9 SCHIZOPHRENIA, UNSPECIFIED: ICD-10-CM

## 2019-07-23 PROCEDURE — 99283 EMERGENCY DEPT VISIT LOW MDM: CPT

## 2019-07-23 RX ORDER — ONDANSETRON 8 MG/1
4 TABLET, FILM COATED ORAL ONCE
Refills: 0 | Status: COMPLETED | OUTPATIENT
Start: 2019-07-23 | End: 2019-07-23

## 2019-07-23 NOTE — ED BEHAVIORAL HEALTH ASSESSMENT NOTE - SUMMARY
maritza is a 49 y o  female, domiciled at [facility], single, no children, unemployed, with past medical history of Type II Diabetes Mellitus, HTN, past psychiatric history of schizophrenia, multiple prior IPP admissions (most recently from 5/29/19-7/10/19 for decompensation of schizophrenia, no history of suicidality, was brought in by ambulance from residence after an episode of unresponsiveness (which was apparently volitional), which was concerning to staff for hypoglycemic episode; also reported sporadic compliance with AM medications.  Psychiatry consult was placed for psychiatric evaluation. On exam, pt presents with euthymic mood, linear thought process, no perceptual abnormalities, and does not appear to have any signs or symptoms suggestive of depression, param or psychosis. Pt denies suicidal/homicidal ideations, intent or plan, and does not appear a danger to herself or others at this time and therefore does not require emergent IPP admissions at this time.     Plan  #Normal psychiatric evaluation  - No acute psychiatric interventions recommended at this time  - Pt to follow up with outpatient psychiatric appointment tomorrow (as per collateral)  - Pt to continue outpatient psychotropic meds as ordered.    Attending note to follow.

## 2019-07-23 NOTE — ED BEHAVIORAL HEALTH ASSESSMENT NOTE - OTHER PAST PSYCHIATRIC HISTORY (INCLUDE DETAILS REGARDING ONSET, COURSE OF ILLNESS, INPATIENT/OUTPATIENT TREATMENT)
Per chart review: Patient has chronic history of schizophrenia, with past IPP admissions, and decade long history of being in and out of [facility].  Most recently discharged from [facility], admitted from 6/29/19-7/10/19, for decompensation of schizophrenia shortly after being discharged to [facility] in 1/19.  Follows with NP Zeenat Anaya and therapist Jairo Hennessy and is sporadically compliant with medications.

## 2019-07-23 NOTE — ED BEHAVIORAL HEALTH ASSESSMENT NOTE - RISK ASSESSMENT
Patient has chronicn elevated risk of self harm due to chronic psychiatric history, however, risk is mitigated by lack of previous suicide attempts, lack of suicidal ideation/intent/plan currently, access to care, and ability to engage in safety planning.  Patient is thus not at imminent risk for self harm and does not warrant inpatient psychiatric hospitalization at this time.

## 2019-07-23 NOTE — ED PROVIDER NOTE - NS ED ROS FT
Constitutional: (-) fever  Eyes/ENT: (-) visual changes   Cardiovascular: (-) chest pain, (-) syncope  Respiratory: (-) cough, (-) shortness of breath  Gastrointestinal: (-) vomiting, (-) diarrhea  Genitourinary: (-) dysuria, (-) hesitancy, (-) frequency   Musculoskeletal: (-) neck pain, (-) back pain, (-) joint pain  Integumentary: (-) rash, (-) edema  Neurological: (-) headache, (-) altered mental status  Allergic/Immunologic: (-) pruritus  Psych: depression

## 2019-07-23 NOTE — ED BEHAVIORAL HEALTH ASSESSMENT NOTE - MEDICATIONS (PRESCRIPTIONS, DIRECTIONS)
Continue with outpatient psychotropic regimen: in the ED: -please give patient Prolixin 37.5mg IM, as she missed her dose due on 7/12/19; at [facility]: continue Mirtazapine 7.5 mg PO qdaily, Clozaril 100mg PO qAM and 200mg qhs, Prolixin 37.5mg [id number], next dose due on 8/2/19

## 2019-07-23 NOTE — ED PROVIDER NOTE - NSFOLLOWUPCLINICS_GEN_ALL_ED_FT
[facility]  [facility]  [address] [place] [id number]  Phone: [phone]  Fax:   Follow Up Time: 1-3 Days

## 2019-07-23 NOTE — ED PROVIDER NOTE - PHYSICAL EXAMINATION
Gen: NAD, AOx3  Head: NCAT  HEENT: PERRL, oral mucosa moist, normal conjunctiva, oropharynx clear without exudate or erythema  Lung: CTAB, no respiratory distress, no wheezing, rales, rhonchi  CV: normal s1/s2, rrr, Normal perfusion, pulses 2+ throughout  Abd: soft, NTND, no CVA tenderness  Genitourinary: no pelvic tenderness  MSK: No edema, no visible deformities, full range of motion in all 4 extremities  Neuro: CN II-XII grossly intact, No focal neurologic deficits  Skin: No rash   Psych: normal affect, no suicidal or homicidal ideation. no auditory or visual hallucinations.

## 2019-07-23 NOTE — ED ADULT NURSE NOTE - OBJECTIVE STATEMENT
pt walked over from [address] [place] [address], verbalizes "I am feeling more depressed, a lot of people keep passing away and they do not have my DM medication so I havent taken my insulin in 2 weeks" denies suicidal thoughts or harming others.

## 2019-07-23 NOTE — ED ADULT NURSE NOTE - CHIEF COMPLAINT QUOTE
patient sent in from [facility]  reports being sad and depressed - that her depression is not being managed well at facility. denies SI/HI

## 2019-07-23 NOTE — ED BEHAVIORAL HEALTH ASSESSMENT NOTE - DETAILS
Per chart review: Reports depression in grandfather and aunt patient discharged from [facility] on 7/10/19. Admitted for decompensation of chronic schizophrenia, from 5/29/19-7/10/19 Dr. Redmond made aware of psychiatric plan Dr. Nicholson made aware

## 2019-07-23 NOTE — ED PROVIDER NOTE - OBJECTIVE STATEMENT
48 yo female with a pmh of depression and DM presents for worsened depression. pt states she had several deaths in her family over the past 6 months that has caused her depression to worsen and today she felt overwhelmed with sadness while thinking of her mother who has passed. she denies any suicidal or homicidal ideation. she denies any other symptoms including fevers, chill, n/v/d, abdominal pain, chest pain, or SOB.

## 2019-07-23 NOTE — ED BEHAVIORAL HEALTH ASSESSMENT NOTE - DESCRIPTION
per chart review on 7-19-19: 49 y o  female, domiciled at [facility] since discharge from [facility] 8 days ago, single, no children, reports college education at [employer], however, also reports current enrollment in GED classes Pt is calm and cooperative. T2DM, HTN

## 2019-07-23 NOTE — ED BEHAVIORAL HEALTH ASSESSMENT NOTE - HPI (INCLUDE ILLNESS QUALITY, SEVERITY, DURATION, TIMING, CONTEXT, MODIFYING FACTORS, ASSOCIATED SIGNS AND SYMPTOMS)
Patient is a 49 y o  female, domiciled at [facility], single, no children, unemployed, with past medical history of Type II Diabetes Mellitus, HTN, past psychiatric history of schizophrenia, multiple prior IPP admissions (most recently from 5/29/19-7/10/19 for decompensation of schizophrenia, no history of suicidality, was brought in by ambulance from residence for elevated blood glucose. Psychiatry consult was placed for psychiatric evaluation. Upon approach, pt is calm and cooperative, states that she was sent to the ED because her blood sugar was high and her residence referred her to ED. States that she has been taking her diabetic medications, yet her blood sugar is still high. States she has been taking all her medications regularly, last taken today. States that earlier this morning she was feeling sad because she was thinking about her mother who passed away in 2012, but denies other symptoms of depression, param and psychosis. Denies suicidal and homicidal ideations, intent and plan. Denies substance use at this time.    Per Rochelle guillermo, [phone] ([facility] staff), the pt was sent in because her blood glucose was elevated. States the pt has been laughing more than usual, however appears to be at her baseline. Collateral denies any recent stressors, or any acute concerning changes in behavior. Denies the pt endorses any suicidal/homicidal ideations, intent or plan. Denies any acute safety concerns for pt or others at this time. Per collateral, pt has appointment with outpatient psychiatrist tomorrow.

## 2019-07-23 NOTE — ED BEHAVIORAL HEALTH ASSESSMENT NOTE - CURRENT MEDICATION
Per chart review on 7-19-19:   Mirtazapine 7.5 mg PO qdaily  Clozaril 100mg PO qAM and 200mg PO qhs   Prolixin Dec 37.5mg [id number], last given on 6/28/19 (was due for dose on 7/12/19, which was not given)

## 2019-07-23 NOTE — ED BEHAVIORAL HEALTH ASSESSMENT NOTE - SAFETY PLAN DETAILS
Pt to return to ED if she develops concerning thoughts or symptoms suggestive of self harm or harm to others

## 2019-07-23 NOTE — ED PROVIDER NOTE - ATTENDING CONTRIBUTION TO CARE
I personally evaluated the patient. I reviewed the Resident’s or Physician Assistant’s note (as assigned above), and agree with the findings and plan except as documented in my note.    50 y/o F with hx of depression presents for eval of depression. Pt denies any SI or HI. Requesting to speak w psychiatrist. No CP, SOB. No rash. No neck pain or stifness. Currently lives at [facility]     CONSTITUTIONAL: Well-developed; well-nourished; in no acute distress. Sitting up and providing appropriate history and physical examination  SKIN: skin exam is warm and dry, no acute rash.  HEAD: Normocephalic; atraumatic.  EYES: PERRL, 3 mm bilateral, no nystagmus, EOM intact; conjunctiva and sclera clear.  ENT: No nasal discharge; airway clear.  NECK: Supple; non tender.+ full passive ROM in all directions. No JVD  CARD: S1, S2 normal; no murmurs, gallops, or rubs. Regular rate and rhythm. + Symmetric Strong Pulses  RESP: No wheezes, rales or rhonchi. Good air movement bilaterally  ABD: soft; non-distended; non-tender. No Rebound, No Gaurding, No signs of peritnitis, No CVA tenderness  EXT: Normal ROM. No clubbing, cyanosis or edema. Dp and Pt Pulses intact. Cap refill less than 3 seconds  NEURO: CN 2-12 intact, normal finger to nose, normal romberg, stable gait, no sensory or motor deficits, Alert, oriented, grossly unremarkable. No Focal deficits. GCS 15. NIH 0  PSYCH:  cooperative

## 2019-07-23 NOTE — ED ADULT TRIAGE NOTE - CHIEF COMPLAINT QUOTE
patient sent in from [facility]  reports being sad and depressed - that her depression is not being managed well at facility. denies SI/HI

## 2019-07-23 NOTE — ED PROVIDER NOTE - NSFOLLOWUPINSTRUCTIONS_ED_ALL_ED_FT
Please follow up with your primary care physician within 24-72 hours and return immediately if symptoms worsen.    Depression    WHAT YOU NEED TO KNOW:    Depression is a medical condition that causes feelings of sadness or hopelessness that do not go away. Depression may cause you to lose interest in things you used to enjoy. These feelings may interfere with your daily life.    DISCHARGE INSTRUCTIONS:    Call your local emergency number (911 in the ) if:     You think about harming yourself or someone else.      You have done something on purpose to hurt yourself.    Call your therapist or doctor if:     Your symptoms do not improve.      You cannot make it to your next appointment.       You have new symptoms.      You have questions or concerns about your condition or care.    The following resources are available at any time to help you, if needed:     National Suicide Prevention Lifeline: 1-586.517.2210 (0-152-685-TALK)      Suicide Hotline: 1-115.574.3619 (1-386-QSYRAOA)      For a list of international numbers: https://save.org/find-help/international-resources/    Medicines:     Antidepressants may be given to improve or balance your mood. You may need to take this medicine for several weeks before you begin to feel better.      Take your medicine as directed. Contact your healthcare provider if you think your medicine is not helping or if you have side effects. Tell him of her if you are allergic to any medicine. Keep a list of the medicines, vitamins, and herbs you take. Include the amounts, and when and why you take them. Bring the list or the pill bottles to follow-up visits. Carry your medicine list with you in case of an emergency.    Therapy is often used together with medicine to relieve depression. Therapy is a way for you to talk about your feelings and anything that may be causing depression. Therapy can be done alone or in a group. It may also be done with family members or a significant other.    Self-care:     Get regular physical activity. Try to be active for 30 minutes, 3 to 5 days a week. Physical activity can help relieve depression. Work with your healthcare provider to develop a plan that you enjoy. It may help to ask someone to be active with you.      Create a regular sleep schedule. A routine can help you relax before bed. Listen to music, read, or do yoga. Try to go to bed and wake up at the same time every day. Sleep is important for emotional health.      Eat a variety of healthy foods. Healthy foods include fruits, vegetables, whole-grain breads, low-fat dairy products, lean meats, fish, and cooked beans. A healthy meal plan is low in fat, salt, and added sugar.      Do not drink alcohol or use drugs. Alcohol and drugs can make depression worse. Talk to your therapist or doctor if you need help quitting.    Follow up with your healthcare provider as directed: Your healthcare provider will monitor your progress at follow-up visits. He or she will also monitor your medicine if you take antidepressants. Your healthcare provider will ask if the medicine is helping. Tell him or her about any side effects or problems you may have with your medicine. The type or amount of medicine may need to be changed. Write down your questions so you remember to ask them during your visits.       © Copyright [employer] 2019 All illustrations and images included in CareNotes are the copyrighted property of [employer] or [employer].

## 2019-07-23 NOTE — ED BEHAVIORAL HEALTH ASSESSMENT NOTE - PAST PSYCHOTROPIC MEDICATION
Per chart review on 7-19-19: Patient has been on multiple medication trials, however, can't provide specifics

## 2019-07-23 NOTE — ED PROVIDER NOTE - CLINICAL SUMMARY MEDICAL DECISION MAKING FREE TEXT BOX
Cleared by psychiatry for discharge. Pt comfortable going home. I have full discussed the medical management and delivery of care with the patient. Patient confirms understanding and has been given detailed return precautions. Patient instructed to return to the ED should symptoms persist or worsen. Patient is well appearing upon discharge.

## 2019-08-04 ENCOUNTER — RX RENEWAL (OUTPATIENT)
Age: 49
End: 2019-08-04

## 2019-08-04 RX ORDER — LOSARTAN POTASSIUM 50 MG/1
50 TABLET, FILM COATED ORAL DAILY
Qty: 90 | Refills: 2 | Status: ACTIVE | COMMUNITY
Start: 2017-02-25 | End: 1900-01-01

## 2019-08-07 ENCOUNTER — OUTPATIENT (OUTPATIENT)
Dept: OUTPATIENT SERVICES | Facility: HOSPITAL | Age: 49
LOS: 1 days | Discharge: HOME | End: 2019-08-07

## 2019-08-07 DIAGNOSIS — F20.9 SCHIZOPHRENIA, UNSPECIFIED: ICD-10-CM

## 2019-08-07 DIAGNOSIS — Z79.899 OTHER LONG TERM (CURRENT) DRUG THERAPY: ICD-10-CM

## 2019-08-09 ENCOUNTER — APPOINTMENT (OUTPATIENT)
Dept: PULMONOLOGY | Facility: CLINIC | Age: 49
End: 2019-08-09

## 2019-08-13 ENCOUNTER — INPATIENT (INPATIENT)
Facility: HOSPITAL | Age: 49
LOS: 22 days | Discharge: HOME | End: 2019-09-05
Attending: PSYCHIATRY & NEUROLOGY | Admitting: PSYCHIATRY & NEUROLOGY
Payer: MEDICARE

## 2019-08-13 VITALS
HEART RATE: 106 BPM | OXYGEN SATURATION: 95 % | DIASTOLIC BLOOD PRESSURE: 87 MMHG | TEMPERATURE: 98 F | SYSTOLIC BLOOD PRESSURE: 133 MMHG | RESPIRATION RATE: 20 BRPM

## 2019-08-13 DIAGNOSIS — F20.0 PARANOID SCHIZOPHRENIA: ICD-10-CM

## 2019-08-13 DIAGNOSIS — Z91.14 PATIENT'S OTHER NONCOMPLIANCE WITH MEDICATION REGIMEN: ICD-10-CM

## 2019-08-13 LAB
ANION GAP SERPL CALC-SCNC: 11 MMOL/L — SIGNIFICANT CHANGE UP (ref 7–14)
APAP SERPL-MCNC: <5 UG/ML — LOW (ref 10–30)
BASOPHILS # BLD AUTO: 0.06 K/UL — SIGNIFICANT CHANGE UP (ref 0–0.2)
BASOPHILS NFR BLD AUTO: 0.7 % — SIGNIFICANT CHANGE UP (ref 0–1)
BUN SERPL-MCNC: 11 MG/DL — SIGNIFICANT CHANGE UP (ref 10–20)
CALCIUM SERPL-MCNC: 9.7 MG/DL — SIGNIFICANT CHANGE UP (ref 8.5–10.1)
CHLORIDE SERPL-SCNC: 106 MMOL/L — SIGNIFICANT CHANGE UP (ref 98–110)
CO2 SERPL-SCNC: 23 MMOL/L — SIGNIFICANT CHANGE UP (ref 17–32)
CREAT SERPL-MCNC: 0.8 MG/DL — SIGNIFICANT CHANGE UP (ref 0.7–1.5)
EOSINOPHIL # BLD AUTO: 0.23 K/UL — SIGNIFICANT CHANGE UP (ref 0–0.7)
EOSINOPHIL NFR BLD AUTO: 2.7 % — SIGNIFICANT CHANGE UP (ref 0–8)
ETHANOL SERPL-MCNC: <10 MG/DL — SIGNIFICANT CHANGE UP
GLUCOSE BLDC GLUCOMTR-MCNC: 260 MG/DL — HIGH (ref 70–99)
GLUCOSE SERPL-MCNC: 240 MG/DL — HIGH (ref 70–99)
HCT VFR BLD CALC: 41.6 % — SIGNIFICANT CHANGE UP (ref 37–47)
HGB BLD-MCNC: 13.1 G/DL — SIGNIFICANT CHANGE UP (ref 12–16)
IMM GRANULOCYTES NFR BLD AUTO: 0.5 % — HIGH (ref 0.1–0.3)
LYMPHOCYTES # BLD AUTO: 1.67 K/UL — SIGNIFICANT CHANGE UP (ref 1.2–3.4)
LYMPHOCYTES # BLD AUTO: 19.6 % — LOW (ref 20.5–51.1)
MCHC RBC-ENTMCNC: 28 PG — SIGNIFICANT CHANGE UP (ref 27–31)
MCHC RBC-ENTMCNC: 31.5 G/DL — LOW (ref 32–37)
MCV RBC AUTO: 88.9 FL — SIGNIFICANT CHANGE UP (ref 81–99)
MONOCYTES # BLD AUTO: 0.55 K/UL — SIGNIFICANT CHANGE UP (ref 0.1–0.6)
MONOCYTES NFR BLD AUTO: 6.4 % — SIGNIFICANT CHANGE UP (ref 1.7–9.3)
NEUTROPHILS # BLD AUTO: 5.98 K/UL — SIGNIFICANT CHANGE UP (ref 1.4–6.5)
NEUTROPHILS NFR BLD AUTO: 70.1 % — SIGNIFICANT CHANGE UP (ref 42.2–75.2)
NRBC # BLD: 0 /100 WBCS — SIGNIFICANT CHANGE UP (ref 0–0)
PLATELET # BLD AUTO: 387 K/UL — SIGNIFICANT CHANGE UP (ref 130–400)
POTASSIUM SERPL-MCNC: 4.8 MMOL/L — SIGNIFICANT CHANGE UP (ref 3.5–5)
POTASSIUM SERPL-SCNC: 4.8 MMOL/L — SIGNIFICANT CHANGE UP (ref 3.5–5)
RBC # BLD: 4.68 M/UL — SIGNIFICANT CHANGE UP (ref 4.2–5.4)
RBC # FLD: 13.4 % — SIGNIFICANT CHANGE UP (ref 11.5–14.5)
SALICYLATES SERPL-MCNC: <0.3 MG/DL — LOW (ref 4–30)
SODIUM SERPL-SCNC: 140 MMOL/L — SIGNIFICANT CHANGE UP (ref 135–146)
WBC # BLD: 8.53 K/UL — SIGNIFICANT CHANGE UP (ref 4.8–10.8)
WBC # FLD AUTO: 8.53 K/UL — SIGNIFICANT CHANGE UP (ref 4.8–10.8)

## 2019-08-13 PROCEDURE — 90792 PSYCH DIAG EVAL W/MED SRVCS: CPT

## 2019-08-13 PROCEDURE — 93010 ELECTROCARDIOGRAM REPORT: CPT

## 2019-08-13 PROCEDURE — 99285 EMERGENCY DEPT VISIT HI MDM: CPT

## 2019-08-13 RX ORDER — DIPHENHYDRAMINE HCL 50 MG
25 CAPSULE ORAL EVERY 6 HOURS
Refills: 0 | Status: DISCONTINUED | OUTPATIENT
Start: 2019-08-13 | End: 2019-09-05

## 2019-08-13 RX ORDER — INSULIN LISPRO 100/ML
3 VIAL (ML) SUBCUTANEOUS
Refills: 0 | Status: DISCONTINUED | OUTPATIENT
Start: 2019-08-13 | End: 2019-08-16

## 2019-08-13 RX ORDER — PIOGLITAZONE HYDROCHLORIDE 15 MG/1
30 TABLET ORAL DAILY
Refills: 0 | Status: DISCONTINUED | OUTPATIENT
Start: 2019-08-13 | End: 2019-09-05

## 2019-08-13 RX ORDER — INSULIN GLARGINE 100 [IU]/ML
25 INJECTION, SOLUTION SUBCUTANEOUS AT BEDTIME
Refills: 0 | Status: DISCONTINUED | OUTPATIENT
Start: 2019-08-13 | End: 2019-08-16

## 2019-08-13 RX ORDER — FLUPHENAZINE HYDROCHLORIDE 1 MG/1
5 TABLET, FILM COATED ORAL EVERY 6 HOURS
Refills: 0 | Status: DISCONTINUED | OUTPATIENT
Start: 2019-08-13 | End: 2019-09-05

## 2019-08-13 RX ORDER — ATORVASTATIN CALCIUM 80 MG/1
40 TABLET, FILM COATED ORAL AT BEDTIME
Refills: 0 | Status: DISCONTINUED | OUTPATIENT
Start: 2019-08-13 | End: 2019-09-05

## 2019-08-13 RX ORDER — LOSARTAN POTASSIUM 100 MG/1
50 TABLET, FILM COATED ORAL DAILY
Refills: 0 | Status: DISCONTINUED | OUTPATIENT
Start: 2019-08-13 | End: 2019-09-05

## 2019-08-13 RX ADMIN — Medication 25 MILLIGRAM(S): at 23:46

## 2019-08-13 RX ADMIN — FLUPHENAZINE HYDROCHLORIDE 5 MILLIGRAM(S): 1 TABLET, FILM COATED ORAL at 23:47

## 2019-08-13 RX ADMIN — PIOGLITAZONE HYDROCHLORIDE 30 MILLIGRAM(S): 15 TABLET ORAL at 23:47

## 2019-08-13 RX ADMIN — ATORVASTATIN CALCIUM 40 MILLIGRAM(S): 80 TABLET, FILM COATED ORAL at 23:47

## 2019-08-13 RX ADMIN — LOSARTAN POTASSIUM 50 MILLIGRAM(S): 100 TABLET, FILM COATED ORAL at 23:46

## 2019-08-13 NOTE — ED PROVIDER NOTE - CHIEF COMPLAINT
The patient is a [AgeY] [Gender] complaining of [CCCP trg chief cmplnt]. The patient is a 49yF complaining of need for psychiatric evaluation.

## 2019-08-13 NOTE — ED ADULT NURSE REASSESSMENT NOTE - NS ED NURSE REASSESS COMMENT FT1
[facility] [place] [facility] Ambulance transport here. Pt belongings searched and wanded. No IV in place.

## 2019-08-13 NOTE — ED BEHAVIORAL HEALTH ASSESSMENT NOTE - RISK ASSESSMENT
At this time , patient is considered a danger to herself and others and needs involuntary inpatient hospitalization for medication management and inpatient psychiatric hospitalization. Patient's clozapine will not be re-started until she see her inpatient psychiatrist and  her reason for stopping the medication is determined. Patient is due for her Prolixin Decanote 37.5mg  on August 16, 2019. Patient's diabetic medications will be continued as prescribed however , patient will benefit from medical consult for better control of diabetes.

## 2019-08-13 NOTE — ED BEHAVIORAL HEALTH ASSESSMENT NOTE - DETAILS
Patient was [facility] informed Staff of [facility] informed IPP Staff informed

## 2019-08-13 NOTE — H&P ADULT - ASSESSMENT
Pt is a 48 yo f h/o schizophrenia, dm, htn resident of [facility] admitted to IPP for paranoid schizophrenia and noncompliance with medication  - admitted to IPP  - care as per psych    #pmhx: DM - continue insulin therapy, FS AC QHS, DM diet    HTN -continue Losartan    HLD- statin qhs     ambulate  dvt ppx

## 2019-08-13 NOTE — ED BEHAVIORAL HEALTH ASSESSMENT NOTE - HPI (INCLUDE ILLNESS QUALITY, SEVERITY, DURATION, TIMING, CONTEXT, MODIFYING FACTORS, ASSOCIATED SIGNS AND SYMPTOMS)
Ms Robertson is a 49 year old  woman, single , has no children, resides at [facility] , uneployed, Ms Robertson is a 49 year old  woman , single , has no children, unemployed, resides at [facility] , with a history of Paranoid Schizophrenia who presented  to the ED after being referred by her residence for on of disorganized behavior . Psychiatry consult was called for a mental health evaluation. Ms Robertson is a 49 year old  woman , single , has no children, unemployed, resides at [facility] , with a history of Paranoid Schizophrenia who presented  to the ED after being referred by her residence for on of disorganized behavior . Psychiatry consult was called for a mental health evaluation.  on approach, patient is sitting on a chair, calm and cooperative, not agitated, occasional lip smacking observed . She reports that she is here because she is not taking her medication. Patient reports that she is always feeling weak and tired but reports that she does not know if this is because of the medication or not. Patient was also asked why she is not taking her medical medications and patient states " I don't know". She denies acute symptoms of psychosis , param or depression. She denies current suicidal thoughts , intent or plan.       Collateral information was obtained from Landy , the Senior case manger of [facility]( [phone]). She reports that the staff referred patient to the ED today because patient had not been compliant with Clozaril and her medical medications , has been increasingly isolating ,, is observed to stare  at a wall or the  floor for long periods of time . She reports that patient has not been doing well since she was discharged from the inpatient psychiatric floor in July.   Collateral information was also  obtained from patient's outpatient psychiatrist Ms Shanda Ennisfabiola ( [phone]) . She reports that she thinks patient should be admitted to the inpatient psychiatric floor because she has not taken her Clozaril in about 3-4 days (missed a total of 7 doses) , is not compliant with outpatient blood work  to check her ANC ( last was 5.82 on 7/9/19) . She reports that after patient's last ED visit , she received her prolixin Decanoate on August 2/2019 as scheduled and is due August 16, 2019 . She reports that she thinks patient's Clozaril should be re-started in an inpatient setting and also is concerned about patient blood sugar control as she does not appear to be taking her medical medication as well.

## 2019-08-13 NOTE — ED PROVIDER NOTE - NS ED ROS FT
Eyes:  No visual changes, eye pain or discharge.  ENMT:  No hearing changes, pain, no sore throat or runny nose, no difficulty swallowing  Cardiac:  No chest pain, SOB or edema. No chest pain with exertion.  Respiratory:  No cough or respiratory distress.    GI:  No nausea, vomiting, diarrhea or abdominal pain.  :  No dysuria, frequency or burning.  MS:  No myalgia, muscle weakness, joint pain or back pain.  Neuro:  No headache or weakness.  No LOC.  Skin:  No skin rash.   Endocrine: No history of thyroid disease. + diabetes.

## 2019-08-13 NOTE — ED PROVIDER NOTE - ATTENDING CONTRIBUTION TO CARE
50 y/o female with hx of Schizophrenia, NIDDM, HTN presents to ED from [address] [place] [address], [facility] in [facility] program to be evaluated for possible inpatient admission.  Patient has been non compliant with meds and has not been home for 3 days.  Patient denies SI/HI.  PE:  agree with above. A/P:  Schizophrenia, check labs, EKG and psych consult.

## 2019-08-13 NOTE — H&P ADULT - PROBLEM SELECTOR PLAN 2
#pmhx:   DM - continue insulin therapy, FS AC QHS, DM diet    HTN -continue Losartan    HLD- statin qhs

## 2019-08-13 NOTE — ED BEHAVIORAL HEALTH ASSESSMENT NOTE - MEDICAL ISSUES AND PLAN (INCLUDE STANDING AND PRN MEDICATION)
Diabetes - Medical consult  for glucose control , continue lantus insultin 25 IU at bedtime, Pioglitazone 30mg P.o daily, Hyerpertension- Continue Losartan 50mg p.o Daily, Hypercholesterolemia- Continue Atorvastatin 40mg P.o bedtime  -

## 2019-08-13 NOTE — ED ADULT NURSE NOTE - CHIEF COMPLAINT QUOTE
Pt from [address] [place] [address], as per staff member she has been missing for 7 days and has not been taking her psych meds. Pt denies suicidal/homicidal ideation.

## 2019-08-13 NOTE — ED BEHAVIORAL HEALTH ASSESSMENT NOTE - CURRENT MEDICATION
Clozapine 100mg p.O Q AM, 400mg P.o Q PM ( last taken 3 days ago), Prolixin Decanoate 37.5mg X 1 dose I.M last received August 2, 2019, Lantus insuline 250 i.u bedtime ,  3 I.U of Lispro insuline TID, pioglitazone 30mg P.o Daily, Losartan 50mg P.o Daily, Atovarstatin 40mg p.o Bedtime

## 2019-08-13 NOTE — ED BEHAVIORAL HEALTH ASSESSMENT NOTE - SUMMARY
Ms Robertson is a 49 year old woman with a history of Paranoid Schizophrenia who presented  to the ED after being referred bty her residence for on of disorganized behavior . Psychiatry consult was called for a mental health evaluation. Patient appears to have been non compliant with her Clozaril, mirtazepine and her medical medication for the past 3 days . patient also has apparently been non compliant t with outpatient psychiatric follow up which involved blood work to monitor her neutrophil count . It is unclear if the reason for her non compliance , particularly of Clozaril is because of side effect versus intentionally not wanting to take psychiatric medication. In addition to this , patient is said to be increasingly disorganised in behavior and is isolating her self and is also not compliant with her medical medication. patient's outpatient psychiatrist would feel more comfortable if the titration of her Clozaril is done in an inpatient psychiatric setting.

## 2019-08-13 NOTE — ED PROVIDER NOTE - PHYSICAL EXAMINATION
Vital Signs: I have reviewed the initial vital signs.  Constitutional: NAD, well-nourished, appears stated age, no acute distress.  HEENT: Airway patent, moist MM, no erythema/swelling/deformity of oral structures. EOMI, PERRLA.  CV: regular rate, regular rhythm, well-perfused extremities, 2+ b/l DP and radial pulses equal.  Lungs: BCTA, no increased WOB.  ABD: NTND, no guarding or rebound, no pulsatile mass, no hernias.   MSK: Neck supple, nontender, nl ROM, no stepoff. Chest nontender. Back nontender in TLS spine or to b/l bony structures or flanks. Ext nontender, nl rom, no deformity.   INTEG: Skin warm, dry, no rash.  NEURO: A&Ox3, moving all extremities, normal speech. sparse of words.  PSYCH: Calm, cooperative, normal affect and interaction. no si hi avh.

## 2019-08-13 NOTE — ED ADULT TRIAGE NOTE - CHIEF COMPLAINT QUOTE
Pt from [address] [place] [address], as per staff member she has been missing for 7 days and has not been taking her psych meds. Pt denies suicidal/homicidal ideation.

## 2019-08-13 NOTE — ED PROVIDER NOTE - OBJECTIVE STATEMENT
49yF with PMH schizophrenia, dm, htn, presents from [facility] for mental health evaluation. pt reported to have increased disorganized behavior by staff, noncompliant with meds for past 3 days. no si/hi/avh, however has had gradual increasing isolation activity. pt has no other complaints at this time, calm and compliant with history.

## 2019-08-13 NOTE — ED PROVIDER NOTE - CARE PLAN
Principal Discharge DX:	Paranoid schizophrenia  Secondary Diagnosis:	Noncompliance with medication regimen

## 2019-08-13 NOTE — H&P ADULT - NSHPLABSRESULTS_GEN_ALL_CORE
13.1   8.53  )-----------( 387      ( 13 Aug 2019 15:21 )             41.6     08-13    140  |  106  |  11  ----------------------------<  240<H>  4.8   |  23  |  0.8    Ca    9.7      13 Aug 2019 15:21      Alcohol, Blood (08.13.19 @ 15:21)    Alcohol, Blood: <10 mg/dL    Acetaminophen Level, Serum (08.13.19 @ 15:21)    Acetaminophen Level, Serum: <5.0 ug/mL      < from: 12 Lead ECG (08.13.19 @ 16:12) >      Ventricular Rate 100 BPM    Atrial Rate 100 BPM    P-R Interval 144 ms    QRS Duration 100 ms    Q-T Interval 364 ms    QTC Calculation(Bezet) 469 ms    P Axis 42 degrees    R Axis 210 degrees    T Axis 32 degrees    Diagnosis Line Normal sinus rhythm  Right superior axis deviation  Incomplete right bundle branch block  Abnormal ECG    Confirmed by Pavel Boo ([id number]) on 8/13/2019 7:45:20 PM    < end of copied text >

## 2019-08-13 NOTE — ED PROVIDER NOTE - CLINICAL SUMMARY MEDICAL DECISION MAKING FREE TEXT BOX
Patient presented from facility with abnormal behavior, increased isolation-like activity, sent for psych eval. Otherwise afebrile, HD stable. Non-compliant with her medications at home, which is likely etiology. Denied SI/HI/hallucinations. Obtained  work up in which was grossly unremarkable. Spoke with psychiatry who evaluated patient and recommended admission to their service. Patient agreeable with plan. Medically cleared for psych admission.

## 2019-08-13 NOTE — ED BEHAVIORAL HEALTH ASSESSMENT NOTE - OTHER
at [facility] and Psychiatrist Adult Home flat concrete  at [facility] and Psychiatrist/ Outpatient Psychiatrist

## 2019-08-13 NOTE — ED BEHAVIORAL HEALTH ASSESSMENT NOTE - DESCRIPTION
Patient has been calm and cooperative, not agitated Hypertension, Diabetes , Hypercholesterolemia Patient reports that she went as far as college , unclear idf she completed it or not ,

## 2019-08-13 NOTE — ED BEHAVIORAL HEALTH ASSESSMENT NOTE - UNABLE TO CARE FOR SELF DETAILS
patient is not taking her medical and psychiatric medication , not following up with outpatient care including blood work

## 2019-08-13 NOTE — ED BEHAVIORAL HEALTH ASSESSMENT NOTE - PSYCHIATRIC ISSUES AND PLAN (INCLUDE STANDING AND PRN MEDICATION)
Paranoid Schizophrenia - Prolixin decanioate  due August 16/2019, Hold Clozapine for now , will re-start after re-evalaution by inpatient psychiatrist , Continue Mirtazepine 15mg p.O bedtime

## 2019-08-13 NOTE — H&P ADULT - HISTORY OF PRESENT ILLNESS
Pt is a 48 yo f h/o schizophrenia, dm, htn resident of [facility] admitted to [facility].  As per report pt has been noncompliant with her medications. Pt currently denies complaints.  Denies fever, chills, CP, SOB, abdominal pain N,V,D, dysuria.

## 2019-08-14 ENCOUNTER — APPOINTMENT (OUTPATIENT)
Dept: INTERNAL MEDICINE | Facility: CLINIC | Age: 49
End: 2019-08-14

## 2019-08-14 LAB
GLUCOSE BLDC GLUCOMTR-MCNC: 245 MG/DL — HIGH (ref 70–99)
GLUCOSE BLDC GLUCOMTR-MCNC: 248 MG/DL — HIGH (ref 70–99)
GLUCOSE BLDC GLUCOMTR-MCNC: 261 MG/DL — HIGH (ref 70–99)
GLUCOSE BLDC GLUCOMTR-MCNC: 302 MG/DL — HIGH (ref 70–99)
GLUCOSE BLDC GLUCOMTR-MCNC: 329 MG/DL — HIGH (ref 70–99)

## 2019-08-14 RX ORDER — HALOPERIDOL DECANOATE 100 MG/ML
5 INJECTION INTRAMUSCULAR EVERY 8 HOURS
Refills: 0 | Status: DISCONTINUED | OUTPATIENT
Start: 2019-08-14 | End: 2019-08-26

## 2019-08-14 RX ORDER — CLOZAPINE 150 MG/1
25 TABLET, ORALLY DISINTEGRATING ORAL AT BEDTIME
Refills: 0 | Status: DISCONTINUED | OUTPATIENT
Start: 2019-08-14 | End: 2019-08-15

## 2019-08-14 RX ADMIN — CLOZAPINE 25 MILLIGRAM(S): 150 TABLET, ORALLY DISINTEGRATING ORAL at 20:24

## 2019-08-14 RX ADMIN — PIOGLITAZONE HYDROCHLORIDE 30 MILLIGRAM(S): 15 TABLET ORAL at 13:04

## 2019-08-14 RX ADMIN — Medication 3 UNIT(S): at 11:37

## 2019-08-14 RX ADMIN — Medication 3 UNIT(S): at 07:44

## 2019-08-14 RX ADMIN — LOSARTAN POTASSIUM 50 MILLIGRAM(S): 100 TABLET, FILM COATED ORAL at 13:04

## 2019-08-14 RX ADMIN — ATORVASTATIN CALCIUM 40 MILLIGRAM(S): 80 TABLET, FILM COATED ORAL at 20:24

## 2019-08-14 RX ADMIN — Medication 3 UNIT(S): at 16:47

## 2019-08-14 RX ADMIN — INSULIN GLARGINE 25 UNIT(S): 100 INJECTION, SOLUTION SUBCUTANEOUS at 20:24

## 2019-08-14 NOTE — CONSULT NOTE ADULT - ASSESSMENT
hx dm  monitor glu  on actos and insulin    hx htn  check bp  on losaran    Imp psych check b12 folate tsh  psych to follow up    moniter for signs/symptoms of constipation and then consider adding mom prn

## 2019-08-14 NOTE — PROGRESS NOTE BEHAVIORAL HEALTH - NSBHFUPINTERVALHXFT_PSY_A_CORE
pt. is 49 years CF, single childless, PPH of Schizoaffective disorder depressed type. She has prior IPPs, she reported she has been stable on clozapine but she stopped it as she was anxious about it. She denied any SE of it. She denied any prior hx of seizures, no hx of head trauma, no family sudden death due to cardiac problems.

## 2019-08-14 NOTE — CONSULT NOTE ADULT - SUBJECTIVE AND OBJECTIVE BOX
TONY OCONNOR  49y  Female      Patient is a 49y old  Female who presents with a chief complaint of psych evaluation (13 Aug 2019 23:43)        PAST MEDICAL/SURGICAL HISTORY  PAST MEDICAL & SURGICAL HISTORY:  Diabetes mellitus, type 2  Depression  No significant past surgical history      REVIEW OF SYSTEMS:  CONSTITUTIONAL: No fever, weight loss, or fatigue  EYES: No eye pain, visual disturbances, or discharge  ENMT:  No difficulty hearing, tinnitus, vertigo; No sinus or throat pain  NECK: No pain or stiffness  RESPIRATORY: No cough, wheezing, chills or hemoptysis; No shortness of breath  CARDIOVASCULAR: No chest pain, palpitations, dizziness, or leg swelling  GASTROINTESTINAL: No abdominal or epigastric pain. No nausea, vomiting, or hematemesis; No diarrhea or constipation. No melena or hematochezia.  GENITOURINARY: No dysuria, frequency, hematuria, or incontinence    ALLERY AND IMMUNOLOGIC: No hives or eczema    atorvastatin 40 milliGRAM(s) Oral at bedtime  diphenhydrAMINE 25 milliGRAM(s) Oral every 6 hours PRN  fluPHENAZine 5 milliGRAM(s) Oral every 6 hours PRN  insulin glargine Injectable (LANTUS) 25 Unit(s) SubCutaneous at bedtime  insulin lispro Injectable (HumaLOG) 3 Unit(s) SubCutaneous three times a day before meals  losartan 50 milliGRAM(s) Oral daily  pioglitazone 30 milliGRAM(s) Oral daily      T(C): 36.5 (08-14-19 @ 06:18), Max: 36.9 (08-13-19 @ 11:44)  HR: 78 (08-14-19 @ 06:18) (78 - 106)  BP: 119/65 (08-14-19 @ 06:18) (119/65 - 141/86)  RR: 16 (08-14-19 @ 06:18) (16 - 20)  SpO2: 98% (08-13-19 @ 15:19) (95% - 98%)  Wt(kg): --Vital Signs Last 24 Hrs  T(C): 36.5 (14 Aug 2019 06:18), Max: 36.9 (13 Aug 2019 11:44)  T(F): 97.7 (14 Aug 2019 06:18), Max: 98.5 (13 Aug 2019 11:44)  HR: 78 (14 Aug 2019 06:18) (78 - 106)  BP: 119/65 (14 Aug 2019 06:18) (119/65 - 141/86)  BP(mean): --  RR: 16 (14 Aug 2019 06:18) (16 - 20)  SpO2: 98% (13 Aug 2019 15:19) (95% - 98%)    PHYSICAL EXAM:  GENERAL: NAD, well-groomed, well-developed  HEAD:  Atraumatic, Normocephalic  EYES: EOMI, PERRLA, conjunctiva and sclera clear  ENMT: No tonsillar erythema, exudates, or enlargement; Moist mucous membranes, Good dentition, No lesions  NECK: Supple, No JVD, Normal thyroid  NERVOUS SYSTEM:  Alert & Oriented X3, Good concentration; Motor Strength 5/5 B/L upper and lower extremities; DTRs 2+ intact and symmetric  CHEST/LUNG: Clear to percussion bilaterally; No rales, rhonchi, wheezing, or rubs  HEART: Regular rate and rhythm; No murmurs, rubs, or gallops  ABDOMEN: Soft, Nontender, Nondistended; Bowel sounds present  EXTREMITIES:  2+ Peripheral Pulses, No clubbing, cyanosis, or edema  LYMPH: No lymphadenopathy noted  SKIN: No rashes or lesions      LABS:  CBC   08-13-19 @ 15:21  Hematcorit 41.6  Hemoglobin 13.1  Mean Cell Hemoglobin 28.0  Platelet Count-Automated 387  RBC Count 4.68  Red Cell Distrib Width 13.4  Wbc Count 8.53      BMP  08-13-19 @ 15:21  Anion Gap. Serum 11  Blood Urea Nitrogen,Serm 11  Calcium, Total Serum 9.7  Carbon Dioxide, Serum 23  Chloride, Serum 106  Creatinine, Serum 0.8  eGFR in African American 100  eGFR in Non Afican American 87  Gloucose, serum 240  Potassium, Serum 4.8  Sodium, Serum 140                  CMP  08-13-19 @ 15:21  Yaa Aminotransferase(ALT/SGPT)--  Albumin, Serum --  Alkaline Phosphatase, Serum --  Anion Gap, Serum 11  Aspartate Aminotransferase (AST/SGOT)--  Bilirubin Total, Serum --  Blood Urea Nitrogen, Serum 11  Calcium,Total Serum 9.7  Carbon Dioxide, Serum 23  Chloride, Serum 106  Creatinine, Serum 0.8  eGFR if  100  eGFR if Non African American 87  Glucose, Serum 240  Potassium, Serum 4.8  Protein Total, Serum --  Sodium, Serum 140                          PT/INR      Amylase/Lipase            RADIOLOGY & ADDITIONAL TESTS:    Imaging Personally Reviewed:  [ ] YES  [ ] NO

## 2019-08-14 NOTE — PROGRESS NOTE BEHAVIORAL HEALTH - PROBLEM SELECTOR PLAN 1
-Start Clozapine 25 mg, and titrate daily by 25 mg   - ANC was 5.9 on 8.13.19   - ANC is every 4 weeks.  - PRN : Haldol 5 mg ( Agitation )  and Ativan 1-2 mg for anxiety

## 2019-08-14 NOTE — CHART NOTE - NSCHARTNOTEFT_GEN_A_CORE
Social Work Admit Note:    Patient is 49 years of age female who was admitted for evaluation of disorganized behavior.  At time of assessment in the emergency department patient endorsed not taking her prescribed medications.  She was not able to verbalize why she was not adherent to prescribed medications.  Suicidal / homicidal ideation denied.  Patient’s supportive housing program was contacted and per  patient was staring at the wall and the floor of the residence.  She was also more isolative than before.  Patient had not been adherent to Clozaril prescribed for a period of three to four days and she was also not agreeable to outpatient blood work.  Prior to this admission patient was also not taking her medical mediations as well.     In the community patient resides at [facility] on the [facility] in [place].  She is single marital status and unemployed.  Patient has history of multiple prior inpatient psychiatric admissions.  She has no known history of suicide attempts.  Patient has history of treatment for paranoid schizophrenia.   at [facility] is [facility] [phone].  Patient’s outpatient provider is ANA MARÍA Courtney [phone].       will continue to meet with patient 1:1 and with treatment team daily.  Discharge plan is for patient to return to [facility] when stable for discharge.      Please refer to Social Work Psychosocial for additional information.

## 2019-08-14 NOTE — PROGRESS NOTE BEHAVIORAL HEALTH - NSBHCHARTREVIEWVS_PSY_A_CORE FT
Vital Signs Last 24 Hrs  T(C): 36.7 (14 Aug 2019 10:00), Max: 36.8 (13 Aug 2019 23:28)  T(F): 98 (14 Aug 2019 10:00), Max: 98.3 (13 Aug 2019 23:28)  HR: 92 (14 Aug 2019 10:00) (78 - 92)  BP: 158/96 (14 Aug 2019 10:00) (119/65 - 158/96)  BP(mean): --  RR: 18 (14 Aug 2019 10:00) (16 - 18)  SpO2: 98% (13 Aug 2019 15:19) (98% - 98%)

## 2019-08-15 DIAGNOSIS — E11.9 TYPE 2 DIABETES MELLITUS WITHOUT COMPLICATIONS: ICD-10-CM

## 2019-08-15 DIAGNOSIS — I10 ESSENTIAL (PRIMARY) HYPERTENSION: ICD-10-CM

## 2019-08-15 LAB
CHOLEST SERPL-MCNC: 134 MG/DL — SIGNIFICANT CHANGE UP (ref 100–200)
ESTIMATED AVERAGE GLUCOSE: 214 MG/DL — HIGH (ref 68–114)
GLUCOSE BLDC GLUCOMTR-MCNC: 188 MG/DL — HIGH (ref 70–99)
GLUCOSE BLDC GLUCOMTR-MCNC: 229 MG/DL — HIGH (ref 70–99)
GLUCOSE BLDC GLUCOMTR-MCNC: 246 MG/DL — HIGH (ref 70–99)
GLUCOSE BLDC GLUCOMTR-MCNC: 290 MG/DL — HIGH (ref 70–99)
HBA1C BLD-MCNC: 9.1 % — HIGH (ref 4–5.6)
HDLC SERPL-MCNC: 37 MG/DL — LOW
LIPID PNL WITH DIRECT LDL SERPL: 88 MG/DL — SIGNIFICANT CHANGE UP (ref 4–129)
TOTAL CHOLESTEROL/HDL RATIO MEASUREMENT: 3.6 RATIO — LOW (ref 4–5.5)
TRIGL SERPL-MCNC: 146 MG/DL — SIGNIFICANT CHANGE UP (ref 10–149)

## 2019-08-15 RX ORDER — FLUPHENAZINE HYDROCHLORIDE 1 MG/1
37.5 TABLET, FILM COATED ORAL ONCE
Refills: 0 | Status: COMPLETED | OUTPATIENT
Start: 2019-08-16 | End: 2019-08-16

## 2019-08-15 RX ORDER — CLOZAPINE 150 MG/1
25 TABLET, ORALLY DISINTEGRATING ORAL AT BEDTIME
Refills: 0 | Status: COMPLETED | OUTPATIENT
Start: 2019-08-15 | End: 2019-08-15

## 2019-08-15 RX ORDER — CLOZAPINE 150 MG/1
25 TABLET, ORALLY DISINTEGRATING ORAL
Refills: 0 | Status: COMPLETED | OUTPATIENT
Start: 2019-08-16 | End: 2019-08-17

## 2019-08-15 RX ADMIN — INSULIN GLARGINE 25 UNIT(S): 100 INJECTION, SOLUTION SUBCUTANEOUS at 20:20

## 2019-08-15 RX ADMIN — Medication 3 UNIT(S): at 11:54

## 2019-08-15 RX ADMIN — ATORVASTATIN CALCIUM 40 MILLIGRAM(S): 80 TABLET, FILM COATED ORAL at 20:20

## 2019-08-15 RX ADMIN — CLOZAPINE 25 MILLIGRAM(S): 150 TABLET, ORALLY DISINTEGRATING ORAL at 20:20

## 2019-08-15 RX ADMIN — Medication 3 UNIT(S): at 09:50

## 2019-08-15 RX ADMIN — Medication 3 UNIT(S): at 16:45

## 2019-08-15 RX ADMIN — LOSARTAN POTASSIUM 50 MILLIGRAM(S): 100 TABLET, FILM COATED ORAL at 09:51

## 2019-08-15 RX ADMIN — PIOGLITAZONE HYDROCHLORIDE 30 MILLIGRAM(S): 15 TABLET ORAL at 09:52

## 2019-08-15 NOTE — PROGRESS NOTE BEHAVIORAL HEALTH - PROBLEM SELECTOR PLAN 1
- Ordered Prolixin Decanoate 37.5mg for 8/16.  - Start Clozapine 25 mg, and titrate daily by 25 mg.  - Today: Clozapine 25mg at bedtime, (tomorrow: 25mg BID),  - ANC was 5.9 on 8.13.19  - ANC is every 4 weeks.  - EKG (8/13): QTc - 469msec.  - Repeat EKG in morning.    - PRN : Haldol 5 mg ( Agitation )  and Ativan 2 mg for anxiety    - Consider restarting Mirtazepine 15mg PO at bedtime

## 2019-08-15 NOTE — PROGRESS NOTE BEHAVIORAL HEALTH - SUMMARY
Ms Robertson is a 50yo F, living at [facility], with a history of Paranoid Schizophrenia who presented to the ED after being referred by her residence for disorganized behavior. Patient appears to have been non compliant with her Clozaril, mirtazepine and her medical medication for the past 3 days. Patient has also been non compliant with outpatient psychiatric follow up which involved blood work to monitor her neutrophil count. It is unclear what the reason for her non compliance is, particularly Clozaril - side effect versus intentionally not wanting to take psychiatric medication. In addition to this , patient is said to be increasingly disorganised in behavior and is isolating her self and is also not compliant with her medical medication. patient's outpatient psychiatrist would feel more comfortable if the titration of her Clozaril is done in an inpatient psychiatric setting.    Patient is due for her Prolixin Decanote 37.5mg on August 16, 2019.

## 2019-08-15 NOTE — PROGRESS NOTE BEHAVIORAL HEALTH - RISK ASSESSMENT
At this time, patient is considered a danger to herself and others and needs involuntary inpatient hospitalization for medication management and due to increased disorganization.

## 2019-08-15 NOTE — PROGRESS NOTE BEHAVIORAL HEALTH - NSBHFUPINTERVALHXFT_PSY_A_CORE
Patient was seen and examined today, and her chart was reviewed. Patient has been calm and cooperative. Patient endorses adequate sleep and appetite, and she denies any side effects of the medications at this time. Patient denies SI/I/P at this time. She endorses hearing voices sometimes but they are mostly positive and talk to her about God and the Bible.

## 2019-08-15 NOTE — PROGRESS NOTE BEHAVIORAL HEALTH - NSBHCHARTREVIEWVS_PSY_A_CORE FT
Vital Signs Last 24 Hrs  T(C): 36.6 (15 Aug 2019 08:51), Max: 37 (15 Aug 2019 06:19)  T(F): 97.9 (15 Aug 2019 08:51), Max: 98.6 (15 Aug 2019 06:19)  HR: 88 (15 Aug 2019 08:51) (70 - 88)  BP: 147/87 (15 Aug 2019 08:51) (123/74 - 147/87)  BP(mean): --  RR: 18 (15 Aug 2019 08:51) (18 - 20)  SpO2: --

## 2019-08-16 LAB
GLUCOSE BLDC GLUCOMTR-MCNC: 125 MG/DL — HIGH (ref 70–99)
GLUCOSE BLDC GLUCOMTR-MCNC: 225 MG/DL — HIGH (ref 70–99)
GLUCOSE BLDC GLUCOMTR-MCNC: 266 MG/DL — HIGH (ref 70–99)
GLUCOSE BLDC GLUCOMTR-MCNC: 279 MG/DL — HIGH (ref 70–99)
GLUCOSE BLDC GLUCOMTR-MCNC: 323 MG/DL — HIGH (ref 70–99)

## 2019-08-16 RX ORDER — CLOZAPINE 150 MG/1
75 TABLET, ORALLY DISINTEGRATING ORAL AT BEDTIME
Refills: 0 | Status: COMPLETED | OUTPATIENT
Start: 2019-08-19 | End: 2019-08-19

## 2019-08-16 RX ORDER — SODIUM CHLORIDE 9 MG/ML
1000 INJECTION, SOLUTION INTRAVENOUS
Refills: 0 | Status: DISCONTINUED | OUTPATIENT
Start: 2019-08-16 | End: 2019-09-05

## 2019-08-16 RX ORDER — CLOZAPINE 150 MG/1
50 TABLET, ORALLY DISINTEGRATING ORAL
Refills: 0 | Status: COMPLETED | OUTPATIENT
Start: 2019-08-18 | End: 2019-08-19

## 2019-08-16 RX ORDER — INSULIN LISPRO 100/ML
VIAL (ML) SUBCUTANEOUS
Refills: 0 | Status: DISCONTINUED | OUTPATIENT
Start: 2019-08-16 | End: 2019-09-05

## 2019-08-16 RX ORDER — DEXTROSE 50 % IN WATER 50 %
15 SYRINGE (ML) INTRAVENOUS ONCE
Refills: 0 | Status: DISCONTINUED | OUTPATIENT
Start: 2019-08-16 | End: 2019-09-05

## 2019-08-16 RX ORDER — INSULIN LISPRO 100/ML
6 VIAL (ML) SUBCUTANEOUS
Refills: 0 | Status: DISCONTINUED | OUTPATIENT
Start: 2019-08-16 | End: 2019-09-05

## 2019-08-16 RX ORDER — CLOZAPINE 150 MG/1
50 TABLET, ORALLY DISINTEGRATING ORAL AT BEDTIME
Refills: 0 | Status: COMPLETED | OUTPATIENT
Start: 2019-08-17 | End: 2019-08-17

## 2019-08-16 RX ORDER — DEXTROSE 50 % IN WATER 50 %
12.5 SYRINGE (ML) INTRAVENOUS ONCE
Refills: 0 | Status: DISCONTINUED | OUTPATIENT
Start: 2019-08-16 | End: 2019-09-05

## 2019-08-16 RX ORDER — DEXTROSE 50 % IN WATER 50 %
25 SYRINGE (ML) INTRAVENOUS ONCE
Refills: 0 | Status: DISCONTINUED | OUTPATIENT
Start: 2019-08-16 | End: 2019-09-05

## 2019-08-16 RX ORDER — GLUCAGON INJECTION, SOLUTION 0.5 MG/.1ML
1 INJECTION, SOLUTION SUBCUTANEOUS ONCE
Refills: 0 | Status: DISCONTINUED | OUTPATIENT
Start: 2019-08-16 | End: 2019-09-05

## 2019-08-16 RX ORDER — INSULIN GLARGINE 100 [IU]/ML
25 INJECTION, SOLUTION SUBCUTANEOUS AT BEDTIME
Refills: 0 | Status: DISCONTINUED | OUTPATIENT
Start: 2019-08-16 | End: 2019-09-05

## 2019-08-16 RX ADMIN — PIOGLITAZONE HYDROCHLORIDE 30 MILLIGRAM(S): 15 TABLET ORAL at 08:51

## 2019-08-16 RX ADMIN — CLOZAPINE 25 MILLIGRAM(S): 150 TABLET, ORALLY DISINTEGRATING ORAL at 20:02

## 2019-08-16 RX ADMIN — LOSARTAN POTASSIUM 50 MILLIGRAM(S): 100 TABLET, FILM COATED ORAL at 08:49

## 2019-08-16 RX ADMIN — FLUPHENAZINE HYDROCHLORIDE 37.5 MILLIGRAM(S): 1 TABLET, FILM COATED ORAL at 08:50

## 2019-08-16 RX ADMIN — Medication 3 UNIT(S): at 11:07

## 2019-08-16 RX ADMIN — CLOZAPINE 25 MILLIGRAM(S): 150 TABLET, ORALLY DISINTEGRATING ORAL at 08:50

## 2019-08-16 RX ADMIN — Medication 4: at 16:35

## 2019-08-16 RX ADMIN — ATORVASTATIN CALCIUM 40 MILLIGRAM(S): 80 TABLET, FILM COATED ORAL at 20:02

## 2019-08-16 RX ADMIN — Medication 3 UNIT(S): at 07:31

## 2019-08-16 RX ADMIN — Medication 6 UNIT(S): at 16:36

## 2019-08-16 RX ADMIN — Medication 3 UNIT(S): at 16:26

## 2019-08-16 RX ADMIN — INSULIN GLARGINE 25 UNIT(S): 100 INJECTION, SOLUTION SUBCUTANEOUS at 22:11

## 2019-08-16 NOTE — CHART NOTE - NSCHARTNOTEFT_GEN_A_CORE
Social Work Note:    Treatment team meeting with patient this morning.  She was able to engage with team.  Discharge plan of return to [facility] discussed.  Patient is agreeable to same.      At this time patient is not psychiatrically stable for discharge.

## 2019-08-16 NOTE — PROGRESS NOTE BEHAVIORAL HEALTH - SUMMARY
Ms Robertson is a 50yo F, living at [facility], with a history of Paranoid Schizophrenia who presented to the ED after being referred by her residence for disorganized behavior. Patient appears to have been non compliant with her Clozaril, mirtazepine and her medical medication for the past 3 days. Patient has also been non compliant with outpatient psychiatric follow up which involved blood work to monitor her neutrophil count. It is unclear what the reason for her non compliance is, particularly Clozaril - side effect versus intentionally not wanting to take psychiatric medication. In addition to this , patient is said to be increasingly disorganised in behavior and is isolating her self and is also not compliant with her medical medication. patient's outpatient psychiatrist would feel more comfortable if the titration of her Clozaril is done in an inpatient psychiatric setting.    Patient is due for her Prolixin Decanote 37.5mg on August 16, 2019.

## 2019-08-16 NOTE — PROGRESS NOTE BEHAVIORAL HEALTH - PROBLEM SELECTOR PLAN 1
- Ordered Prolixin Decanoate 37.5mg for 8/16.  - Start Clozapine 25 mg, and titrate daily by 25 mg.  - Today: Clozapine 25mg at bedtime; 8/17: 25mg AM, 50mg PM; 8/18: 50mg BID; 8/19: 50mg AM, 75mg PM.  - ANC was 5.9 on 8.13.19  - ANC is every 4 weeks.  - EKG (8/13): QTc - 469msec.  - Repeat EKG in morning.    - PRN : Haldol 5 mg ( Agitation )  and Ativan 2 mg for anxiety    - Consider restarting Mirtazepine 15mg PO at bedtime

## 2019-08-16 NOTE — PROGRESS NOTE BEHAVIORAL HEALTH - NSBHFUPINTERVALHXFT_PSY_A_CORE
Patient was seen and examined today in treatment team meeting, and her chart was reviewed. Patient has been calm and cooperative. Patient endorses adequate sleep and appetite, and she complains of "restlessness" that she attributes to her anxiety. Patient received her Prolixin Decanoate IM this morning and reported some tiredness afterwards. Patient denies SI/I/P at this time.

## 2019-08-16 NOTE — PROGRESS NOTE BEHAVIORAL HEALTH - NSBHCHARTREVIEWVS_PSY_A_CORE FT
Vital Signs Last 24 Hrs  T(C): 36.1 (16 Aug 2019 10:00), Max: 36.7 (16 Aug 2019 06:03)  T(F): 96.9 (16 Aug 2019 10:00), Max: 98 (16 Aug 2019 06:03)  HR: 76 (16 Aug 2019 10:00) (75 - 76)  BP: 131/- (16 Aug 2019 10:00) (128/83 - 131/-)  BP(mean): 83 (16 Aug 2019 10:00) (83 - 83)  RR: 18 (16 Aug 2019 10:00) (18 - 18)  SpO2: --

## 2019-08-17 LAB
GLUCOSE BLDC GLUCOMTR-MCNC: 158 MG/DL — HIGH (ref 70–99)
GLUCOSE BLDC GLUCOMTR-MCNC: 181 MG/DL — HIGH (ref 70–99)
GLUCOSE BLDC GLUCOMTR-MCNC: 186 MG/DL — HIGH (ref 70–99)
GLUCOSE BLDC GLUCOMTR-MCNC: 220 MG/DL — HIGH (ref 70–99)

## 2019-08-17 RX ADMIN — Medication 1: at 11:34

## 2019-08-17 RX ADMIN — CLOZAPINE 25 MILLIGRAM(S): 150 TABLET, ORALLY DISINTEGRATING ORAL at 08:02

## 2019-08-17 RX ADMIN — Medication 6 UNIT(S): at 15:56

## 2019-08-17 RX ADMIN — LOSARTAN POTASSIUM 50 MILLIGRAM(S): 100 TABLET, FILM COATED ORAL at 08:02

## 2019-08-17 RX ADMIN — Medication 2: at 15:57

## 2019-08-17 RX ADMIN — Medication 6 UNIT(S): at 07:39

## 2019-08-17 RX ADMIN — Medication 6 UNIT(S): at 11:34

## 2019-08-17 RX ADMIN — CLOZAPINE 50 MILLIGRAM(S): 150 TABLET, ORALLY DISINTEGRATING ORAL at 20:00

## 2019-08-17 RX ADMIN — INSULIN GLARGINE 25 UNIT(S): 100 INJECTION, SOLUTION SUBCUTANEOUS at 20:25

## 2019-08-17 RX ADMIN — Medication 1: at 07:41

## 2019-08-17 RX ADMIN — PIOGLITAZONE HYDROCHLORIDE 30 MILLIGRAM(S): 15 TABLET ORAL at 08:03

## 2019-08-17 RX ADMIN — ATORVASTATIN CALCIUM 40 MILLIGRAM(S): 80 TABLET, FILM COATED ORAL at 20:00

## 2019-08-17 NOTE — PROGRESS NOTE BEHAVIORAL HEALTH - SUMMARY
Ms Robertson is a 48yo F, living at [facility], with a history of Paranoid Schizophrenia who presented to the ED after being referred by her residence for disorganized behavior. Patient appears to have been non compliant with her Clozaril, mirtazepine and her medical medication for the past 3 days. Patient has also been non compliant with outpatient psychiatric follow up which involved blood work to monitor her neutrophil count. It is unclear what the reason for her non compliance is, particularly Clozaril - side effect versus intentionally not wanting to take psychiatric medication. In addition to this , patient is said to be increasingly disorganised in behavior and is isolating her self and is also not compliant with her medical medication. patient's outpatient psychiatrist would feel more comfortable if the titration of her Clozaril is done in an inpatient psychiatric setting.    Patient is due for her Prolixin Decanote 37.5mg on August 16, 2019.

## 2019-08-17 NOTE — PROGRESS NOTE BEHAVIORAL HEALTH - NSBHCHARTREVIEWVS_PSY_A_CORE FT
T(C): 36.7 (08-17-19 @ 16:00), Max: 36.8 (08-17-19 @ 09:06)  HR: 95 (08-17-19 @ 16:00) (69 - 97)  BP: 128/98 (08-17-19 @ 16:00) (109/71 - 128/98)  RR: 18 (08-17-19 @ 16:00) (18 - 18)  SpO2: --

## 2019-08-17 NOTE — PROGRESS NOTE BEHAVIORAL HEALTH - NSBHFUPINTERVALHXFT_PSY_A_CORE
Pt was seen, and evaluated, and chart was reviewed. No acute events overnight.  Patient is alert, Ox3,  calm, cooperative, compliant with treatment. Patient is in good behavioral control.  Pts sitter is expected to visit today.   ***Pt denies and presents no evidence for suicidal or homicidal ideas plans or intentions.  Pt is not acutely psychotic and denies A/V H.  ***  Pt has no medication related complaints. Continues current medication regimen.

## 2019-08-18 LAB
GLUCOSE BLDC GLUCOMTR-MCNC: 158 MG/DL — HIGH (ref 70–99)
GLUCOSE BLDC GLUCOMTR-MCNC: 160 MG/DL — HIGH (ref 70–99)
GLUCOSE BLDC GLUCOMTR-MCNC: 280 MG/DL — HIGH (ref 70–99)
GLUCOSE BLDC GLUCOMTR-MCNC: 95 MG/DL — SIGNIFICANT CHANGE UP (ref 70–99)

## 2019-08-18 RX ADMIN — Medication 6 UNIT(S): at 11:27

## 2019-08-18 RX ADMIN — Medication 1: at 07:14

## 2019-08-18 RX ADMIN — CLOZAPINE 50 MILLIGRAM(S): 150 TABLET, ORALLY DISINTEGRATING ORAL at 20:03

## 2019-08-18 RX ADMIN — Medication 6 UNIT(S): at 07:14

## 2019-08-18 RX ADMIN — Medication 6 UNIT(S): at 16:22

## 2019-08-18 RX ADMIN — Medication 1: at 16:22

## 2019-08-18 RX ADMIN — ATORVASTATIN CALCIUM 40 MILLIGRAM(S): 80 TABLET, FILM COATED ORAL at 20:03

## 2019-08-18 RX ADMIN — LOSARTAN POTASSIUM 50 MILLIGRAM(S): 100 TABLET, FILM COATED ORAL at 09:05

## 2019-08-18 RX ADMIN — INSULIN GLARGINE 25 UNIT(S): 100 INJECTION, SOLUTION SUBCUTANEOUS at 20:47

## 2019-08-18 RX ADMIN — CLOZAPINE 50 MILLIGRAM(S): 150 TABLET, ORALLY DISINTEGRATING ORAL at 09:06

## 2019-08-18 RX ADMIN — PIOGLITAZONE HYDROCHLORIDE 30 MILLIGRAM(S): 15 TABLET ORAL at 09:06

## 2019-08-19 LAB
GLUCOSE BLDC GLUCOMTR-MCNC: 177 MG/DL — HIGH (ref 70–99)
GLUCOSE BLDC GLUCOMTR-MCNC: 273 MG/DL — HIGH (ref 70–99)
GLUCOSE BLDC GLUCOMTR-MCNC: 278 MG/DL — HIGH (ref 70–99)

## 2019-08-19 RX ORDER — CLOZAPINE 150 MG/1
75 TABLET, ORALLY DISINTEGRATING ORAL
Refills: 0 | Status: COMPLETED | OUTPATIENT
Start: 2019-08-20 | End: 2019-08-21

## 2019-08-19 RX ORDER — PROPRANOLOL HCL 160 MG
10 CAPSULE, EXTENDED RELEASE 24HR ORAL THREE TIMES A DAY
Refills: 0 | Status: DISCONTINUED | OUTPATIENT
Start: 2019-08-19 | End: 2019-08-20

## 2019-08-19 RX ADMIN — Medication 6 UNIT(S): at 07:43

## 2019-08-19 RX ADMIN — INSULIN GLARGINE 25 UNIT(S): 100 INJECTION, SOLUTION SUBCUTANEOUS at 22:11

## 2019-08-19 RX ADMIN — CLOZAPINE 50 MILLIGRAM(S): 150 TABLET, ORALLY DISINTEGRATING ORAL at 08:01

## 2019-08-19 RX ADMIN — PIOGLITAZONE HYDROCHLORIDE 30 MILLIGRAM(S): 15 TABLET ORAL at 08:02

## 2019-08-19 RX ADMIN — ATORVASTATIN CALCIUM 40 MILLIGRAM(S): 80 TABLET, FILM COATED ORAL at 20:05

## 2019-08-19 RX ADMIN — CLOZAPINE 75 MILLIGRAM(S): 150 TABLET, ORALLY DISINTEGRATING ORAL at 20:04

## 2019-08-19 RX ADMIN — Medication 1: at 07:43

## 2019-08-19 RX ADMIN — LOSARTAN POTASSIUM 50 MILLIGRAM(S): 100 TABLET, FILM COATED ORAL at 08:01

## 2019-08-19 RX ADMIN — Medication 3: at 16:38

## 2019-08-19 RX ADMIN — Medication 10 MILLIGRAM(S): at 20:02

## 2019-08-19 RX ADMIN — Medication 6 UNIT(S): at 16:38

## 2019-08-19 NOTE — CHART NOTE - NSCHARTNOTEFT_GEN_A_CORE
Social Work Note:    Today patient endorsed poor sleep.  On interview patient had poor eye contact.  During the day patient has been observed to be visible on the unit.  She endorses audio hallucinations.  They are voices that are speaking to her about being a better person and they are talking to her about the Bible.    No barriers to discharge identified at this time.  Plan is for patient to return to [facility].      At this time patient is not psychiatrically stable for discharge.

## 2019-08-19 NOTE — PROGRESS NOTE BEHAVIORAL HEALTH - NSBHFUPINTERVALHXFT_PSY_A_CORE
Patient was seen and examined today, and her chart was reviewed. Patient has been calm and cooperative. Patient was unable to sleep last night due to "feeling restless." She was sitting up in her bed this morning, appearing somewhat jittery and with poor eye contact. It is possible that she is feeling some akithisia from her recent injection of Prolixin. Patient denies SI/I/P at this time. She continues to endorse AH, which are mostly encouraging as they talk to her about the Bible and how to be a good person.

## 2019-08-19 NOTE — PROGRESS NOTE BEHAVIORAL HEALTH - NSBHCHARTREVIEWVS_PSY_A_CORE FT
Vital Signs Last 24 Hrs  T(C): 36.4 (18 Aug 2019 16:19), Max: 36.4 (18 Aug 2019 16:19)  T(F): 97.5 (18 Aug 2019 16:19), Max: 97.5 (18 Aug 2019 16:19)  HR: 94 (18 Aug 2019 16:19) (94 - 94)  BP: 110/78 (18 Aug 2019 16:19) (110/78 - 110/78)  BP(mean): --  RR: 18 (18 Aug 2019 16:19) (18 - 18)  SpO2: --

## 2019-08-19 NOTE — PROGRESS NOTE BEHAVIORAL HEALTH - PROBLEM SELECTOR PLAN 1
- Ordered Prolixin Decanoate 37.5mg for 8/16.  - Start Clozapine 25 mg, and titrate daily by 25 mg.  - Today: 50mg AM, 75mg PM; Tomorrow: 75mg BID.  - ANC was 5.9 on 8.13.19  - ANC is every 4 weeks.  - EKG (8/13): QTc - 469msec.  - Repeat EKG in morning.    - PRN : Haldol 5 mg ( Agitation )  and Ativan 2 mg for anxiety  - Start Propranolol 10mg TID for Akithisia.

## 2019-08-19 NOTE — PROGRESS NOTE BEHAVIORAL HEALTH - SUMMARY
Ms Robertson is a 48yo F, living at [facility], with a history of Paranoid Schizophrenia who presented to the ED after being referred by her residence for disorganized behavior. Patient appears to have been non compliant with her Clozaril, mirtazepine and her medical medication for the past 3 days. Patient has also been non compliant with outpatient psychiatric follow up which involved blood work to monitor her neutrophil count. It is unclear what the reason for her non compliance is, particularly Clozaril - side effect versus intentionally not wanting to take psychiatric medication. In addition to this , patient is said to be increasingly disorganised in behavior and is isolating her self and is also not compliant with her medical medication. patient's outpatient psychiatrist would feel more comfortable if the titration of her Clozaril is done in an inpatient psychiatric setting.    Patient is due for her Prolixin Decanote 37.5mg on August 16, 2019.

## 2019-08-20 LAB
GLUCOSE BLDC GLUCOMTR-MCNC: 152 MG/DL — HIGH (ref 70–99)
GLUCOSE BLDC GLUCOMTR-MCNC: 184 MG/DL — HIGH (ref 70–99)
GLUCOSE BLDC GLUCOMTR-MCNC: 285 MG/DL — HIGH (ref 70–99)
GLUCOSE BLDC GLUCOMTR-MCNC: 319 MG/DL — HIGH (ref 70–99)

## 2019-08-20 RX ORDER — CLOZAPINE 150 MG/1
100 TABLET, ORALLY DISINTEGRATING ORAL
Refills: 0 | Status: COMPLETED | OUTPATIENT
Start: 2019-08-22 | End: 2019-08-23

## 2019-08-20 RX ORDER — CLOZAPINE 150 MG/1
100 TABLET, ORALLY DISINTEGRATING ORAL AT BEDTIME
Refills: 0 | Status: COMPLETED | OUTPATIENT
Start: 2019-08-21 | End: 2019-08-21

## 2019-08-20 RX ORDER — PROPRANOLOL HCL 160 MG
20 CAPSULE, EXTENDED RELEASE 24HR ORAL THREE TIMES A DAY
Refills: 0 | Status: DISCONTINUED | OUTPATIENT
Start: 2019-08-20 | End: 2019-09-05

## 2019-08-20 RX ADMIN — INSULIN GLARGINE 25 UNIT(S): 100 INJECTION, SOLUTION SUBCUTANEOUS at 22:44

## 2019-08-20 RX ADMIN — LOSARTAN POTASSIUM 50 MILLIGRAM(S): 100 TABLET, FILM COATED ORAL at 11:28

## 2019-08-20 RX ADMIN — Medication 20 MILLIGRAM(S): at 19:50

## 2019-08-20 RX ADMIN — CLOZAPINE 75 MILLIGRAM(S): 150 TABLET, ORALLY DISINTEGRATING ORAL at 11:25

## 2019-08-20 RX ADMIN — Medication 3: at 07:44

## 2019-08-20 RX ADMIN — Medication 6 UNIT(S): at 11:29

## 2019-08-20 RX ADMIN — CLOZAPINE 75 MILLIGRAM(S): 150 TABLET, ORALLY DISINTEGRATING ORAL at 19:48

## 2019-08-20 RX ADMIN — Medication 10 MILLIGRAM(S): at 11:28

## 2019-08-20 RX ADMIN — Medication 1: at 16:14

## 2019-08-20 RX ADMIN — Medication 6 UNIT(S): at 16:14

## 2019-08-20 RX ADMIN — Medication 6 UNIT(S): at 07:42

## 2019-08-20 RX ADMIN — PIOGLITAZONE HYDROCHLORIDE 30 MILLIGRAM(S): 15 TABLET ORAL at 11:29

## 2019-08-20 RX ADMIN — Medication 20 MILLIGRAM(S): at 12:49

## 2019-08-20 RX ADMIN — ATORVASTATIN CALCIUM 40 MILLIGRAM(S): 80 TABLET, FILM COATED ORAL at 19:48

## 2019-08-20 RX ADMIN — Medication 1: at 11:29

## 2019-08-20 NOTE — PROGRESS NOTE BEHAVIORAL HEALTH - NSBHCHARTREVIEWVS_PSY_A_CORE FT
Vital Signs Last 24 Hrs  T(C): 36.1 (20 Aug 2019 10:52), Max: 37 (20 Aug 2019 06:26)  T(F): 96.9 (20 Aug 2019 10:52), Max: 98.6 (20 Aug 2019 06:26)  HR: 98 (20 Aug 2019 10:52) (89 - 101)  BP: 155/99 (20 Aug 2019 10:52) (126/79 - 155/99)  BP(mean): --  RR: 18 (20 Aug 2019 10:52) (16 - 18)  SpO2: --

## 2019-08-20 NOTE — PROGRESS NOTE BEHAVIORAL HEALTH - NSBHFUPINTERVALHXFT_PSY_A_CORE
Patient was seen and examined, and her chart was reviewed. As per nursing, no acute events occurred overnight, and patient has been seen out on the unit more and attending group sessions. Patient endorsed that again she was unable to sleep last night due to "feeling restless," as well as having thoughts of people she has lost in her life, most recently one of her friends who passed away over the summer. Patient denies SI/I/P at this time. She also mentions feeling "nervous" and misses her niece.  Last BM - 2 days ago.

## 2019-08-21 LAB
GLUCOSE BLDC GLUCOMTR-MCNC: 160 MG/DL — HIGH (ref 70–99)
GLUCOSE BLDC GLUCOMTR-MCNC: 167 MG/DL — HIGH (ref 70–99)
GLUCOSE BLDC GLUCOMTR-MCNC: 237 MG/DL — HIGH (ref 70–99)
GLUCOSE BLDC GLUCOMTR-MCNC: 237 MG/DL — HIGH (ref 70–99)

## 2019-08-21 RX ADMIN — Medication 2: at 11:53

## 2019-08-21 RX ADMIN — HALOPERIDOL DECANOATE 5 MILLIGRAM(S): 100 INJECTION INTRAMUSCULAR at 02:59

## 2019-08-21 RX ADMIN — Medication 1 MILLIGRAM(S): at 20:34

## 2019-08-21 RX ADMIN — INSULIN GLARGINE 25 UNIT(S): 100 INJECTION, SOLUTION SUBCUTANEOUS at 20:30

## 2019-08-21 RX ADMIN — Medication 6 UNIT(S): at 11:54

## 2019-08-21 RX ADMIN — Medication 20 MILLIGRAM(S): at 08:11

## 2019-08-21 RX ADMIN — Medication 1: at 07:51

## 2019-08-21 RX ADMIN — PIOGLITAZONE HYDROCHLORIDE 30 MILLIGRAM(S): 15 TABLET ORAL at 08:12

## 2019-08-21 RX ADMIN — ATORVASTATIN CALCIUM 40 MILLIGRAM(S): 80 TABLET, FILM COATED ORAL at 20:26

## 2019-08-21 RX ADMIN — Medication 6 UNIT(S): at 16:32

## 2019-08-21 RX ADMIN — CLOZAPINE 100 MILLIGRAM(S): 150 TABLET, ORALLY DISINTEGRATING ORAL at 20:26

## 2019-08-21 RX ADMIN — LOSARTAN POTASSIUM 50 MILLIGRAM(S): 100 TABLET, FILM COATED ORAL at 08:11

## 2019-08-21 RX ADMIN — Medication 20 MILLIGRAM(S): at 20:26

## 2019-08-21 RX ADMIN — Medication 20 MILLIGRAM(S): at 12:15

## 2019-08-21 RX ADMIN — Medication 6 UNIT(S): at 07:51

## 2019-08-21 RX ADMIN — CLOZAPINE 75 MILLIGRAM(S): 150 TABLET, ORALLY DISINTEGRATING ORAL at 08:12

## 2019-08-21 RX ADMIN — Medication 2: at 16:32

## 2019-08-21 NOTE — PROGRESS NOTE BEHAVIORAL HEALTH - SUMMARY
Ms Robertson is a 50yo F, living at [facility], with a history of Paranoid Schizophrenia who presented to the ED after being referred by her residence for disorganized behavior. Patient appears to have been non compliant with her Clozaril, mirtazepine and her medical medication for the past 3 days. Patient has also been non compliant with outpatient psychiatric follow up which involved blood work to monitor her neutrophil count. It is unclear what the reason for her non compliance is, particularly Clozaril - side effect versus intentionally not wanting to take psychiatric medication. In addition to this , patient is said to be increasingly disorganised in behavior and is isolating her self and is also not compliant with her medical medication. patient's outpatient psychiatrist would feel more comfortable if the titration of her Clozaril is done in an inpatient psychiatric setting.    Patient is due for her Prolixin Decanote 37.5mg on August 16, 2019.     Risk Assessment (consider static vs modifiable risk factors and protective factors; comment on level of risk for dangerous behavior): At this time, patient is considered a danger to herself and others and needs involuntary inpatient hospitalization for medication management and due to increased disorganization.

## 2019-08-21 NOTE — CHART NOTE - NSCHARTNOTEFT_GEN_A_CORE
Social Work Note:    Patient is observed to be isolative to her room.  Her appearance is disheveled.  During the day she is observed to be engaged in self dialogue.  Patient is encouraged to be visible on the unit and attend groups that are offered during the day.      No barriers to discharge identified at this time.  Plan is for patient to return to [facility].      At this time patient is not psychiatrically stable for discharge.

## 2019-08-21 NOTE — PROGRESS NOTE BEHAVIORAL HEALTH - NSBHCHARTREVIEWVS_PSY_A_CORE FT
Vital Signs Last 24 Hrs  T(C): 36.8 (21 Aug 2019 09:17), Max: 36.8 (21 Aug 2019 09:17)  T(F): 98.2 (21 Aug 2019 09:17), Max: 98.2 (21 Aug 2019 09:17)  HR: 88 (21 Aug 2019 09:17) (88 - 97)  BP: 126/63 (21 Aug 2019 09:17) (124/86 - 132/72)  BP(mean): --  RR: 18 (21 Aug 2019 09:17) (18 - 20)  SpO2: --

## 2019-08-21 NOTE — PROGRESS NOTE BEHAVIORAL HEALTH - NSBHFUPINTERVALHXFT_PSY_A_CORE
Patient was seen and examined, and her chart was reviewed. As per nursing, no acute events occurred overnight, and patient reported feeling better, less anxious. She denied any loss of interest, no SIIP or HIIP. she reported minimal voices.

## 2019-08-22 LAB
GLUCOSE BLDC GLUCOMTR-MCNC: 125 MG/DL — HIGH (ref 70–99)
GLUCOSE BLDC GLUCOMTR-MCNC: 148 MG/DL — HIGH (ref 70–99)
GLUCOSE BLDC GLUCOMTR-MCNC: 165 MG/DL — HIGH (ref 70–99)
GLUCOSE BLDC GLUCOMTR-MCNC: 262 MG/DL — HIGH (ref 70–99)

## 2019-08-22 RX ORDER — CLOZAPINE 150 MG/1
125 TABLET, ORALLY DISINTEGRATING ORAL AT BEDTIME
Refills: 0 | Status: COMPLETED | OUTPATIENT
Start: 2019-08-23 | End: 2019-08-23

## 2019-08-22 RX ADMIN — PIOGLITAZONE HYDROCHLORIDE 30 MILLIGRAM(S): 15 TABLET ORAL at 08:26

## 2019-08-22 RX ADMIN — Medication 1: at 16:30

## 2019-08-22 RX ADMIN — Medication 6 UNIT(S): at 07:39

## 2019-08-22 RX ADMIN — Medication 6 UNIT(S): at 11:15

## 2019-08-22 RX ADMIN — HALOPERIDOL DECANOATE 5 MILLIGRAM(S): 100 INJECTION INTRAMUSCULAR at 02:25

## 2019-08-22 RX ADMIN — Medication 20 MILLIGRAM(S): at 20:24

## 2019-08-22 RX ADMIN — CLOZAPINE 100 MILLIGRAM(S): 150 TABLET, ORALLY DISINTEGRATING ORAL at 08:25

## 2019-08-22 RX ADMIN — CLOZAPINE 100 MILLIGRAM(S): 150 TABLET, ORALLY DISINTEGRATING ORAL at 20:24

## 2019-08-22 RX ADMIN — Medication 6 UNIT(S): at 16:31

## 2019-08-22 RX ADMIN — ATORVASTATIN CALCIUM 40 MILLIGRAM(S): 80 TABLET, FILM COATED ORAL at 20:24

## 2019-08-22 RX ADMIN — INSULIN GLARGINE 25 UNIT(S): 100 INJECTION, SOLUTION SUBCUTANEOUS at 20:24

## 2019-08-22 RX ADMIN — Medication 3: at 11:16

## 2019-08-22 RX ADMIN — LOSARTAN POTASSIUM 50 MILLIGRAM(S): 100 TABLET, FILM COATED ORAL at 08:25

## 2019-08-22 RX ADMIN — Medication 20 MILLIGRAM(S): at 12:47

## 2019-08-22 RX ADMIN — Medication 25 MILLIGRAM(S): at 02:25

## 2019-08-22 RX ADMIN — Medication 20 MILLIGRAM(S): at 08:26

## 2019-08-22 NOTE — PROGRESS NOTE BEHAVIORAL HEALTH - NSBHCHARTREVIEWVS_PSY_A_CORE FT
Vital Signs Last 24 Hrs  T(C): 35.9 (21 Aug 2019 17:47), Max: 35.9 (21 Aug 2019 17:47)  T(F): 96.6 (21 Aug 2019 17:47), Max: 96.6 (21 Aug 2019 17:47)  HR: 80 (21 Aug 2019 17:47) (80 - 80)  BP: 132/96 (21 Aug 2019 17:47) (132/96 - 132/96)  BP(mean): --  RR: 18 (21 Aug 2019 17:47) (18 - 18)  SpO2: --

## 2019-08-22 NOTE — PROGRESS NOTE BEHAVIORAL HEALTH - PROBLEM SELECTOR PLAN 1
- Prolixin Decanoate 37.5mg given on 8/16.  - Clozapine: continue to increase by 25mg daily.  - Today: Clozapine 100mg BID; Tomorrow: Clozapine 100mg morning,   - ANC was 5.9 on 8.13.19  - ANC is every 4 weeks.  - EKG (8/13): QTc - 469msec.    - PRN : Haldol 5 mg ( Agitation )  and Ativan 2 mg for anxiety  - Increase Propranolol to 20mg TID for Akithisia, with the parameter to hold if BP is <120/80. - Prolixin Decanoate 37.5mg given on 8/16.  - Clozapine: continue to increase by 25mg daily.  - Today: Clozapine 100mg BID; Tomorrow: Clozapine 100mg morning, 125mg at night.  - ANC was 5.9 on 8.13.19  - ANC is every 4 weeks.  - EKG (8/13): QTc - 469msec.    - PRN : Haldol 5 mg ( Agitation )  and Ativan 2 mg for anxiety  - c/w Propranolol 20mg TID for Akithisia, with the parameter to hold if BP is <120/80.

## 2019-08-22 NOTE — PROGRESS NOTE BEHAVIORAL HEALTH - SUMMARY
Ms Robertson is a 48yo F, living at [facility], with a history of Paranoid Schizophrenia who presented to the ED after being referred by her residence for disorganized behavior. Patient appears to have been non compliant with her Clozaril, mirtazepine and her medical medication for the past 3 days. Patient has also been non compliant with outpatient psychiatric follow up which involved blood work to monitor her neutrophil count. It is unclear what the reason for her non compliance is, particularly Clozaril - side effect versus intentionally not wanting to take psychiatric medication. In addition to this , patient is said to be increasingly disorganised in behavior and is isolating herself and is also not compliant with her medical medication. patient's outpatient psychiatrist would feel more comfortable if the titration of her Clozaril is done in an inpatient psychiatric setting.    Patient is due for her Prolixin Decanote 37.5mg on August 16, 2019.     Risk Assessment (consider static vs modifiable risk factors and protective factors; comment on level of risk for dangerous behavior): At this time, patient is considered a danger to herself and others and needs involuntary inpatient hospitalization for medication management and due to increased disorganization.

## 2019-08-22 NOTE — PROGRESS NOTE BEHAVIORAL HEALTH - NSBHFUPINTERVALHXFT_PSY_A_CORE
Patient was seen and examined, and her chart was reviewed. As per nursing, patient did not sleep at all last night. Patient endorsed that again she was unable to sleep last night due to "feeling restless," as well as having thoughts of her neice. Patient denies SI/I/P at this time.

## 2019-08-23 LAB
GLUCOSE BLDC GLUCOMTR-MCNC: 145 MG/DL — HIGH (ref 70–99)
GLUCOSE BLDC GLUCOMTR-MCNC: 233 MG/DL — HIGH (ref 70–99)
GLUCOSE BLDC GLUCOMTR-MCNC: 235 MG/DL — HIGH (ref 70–99)

## 2019-08-23 RX ORDER — CLOZAPINE 150 MG/1
150 TABLET, ORALLY DISINTEGRATING ORAL
Refills: 0 | Status: DISCONTINUED | OUTPATIENT
Start: 2019-08-26 | End: 2019-08-26

## 2019-08-23 RX ORDER — CLOZAPINE 150 MG/1
150 TABLET, ORALLY DISINTEGRATING ORAL AT BEDTIME
Refills: 0 | Status: COMPLETED | OUTPATIENT
Start: 2019-08-25 | End: 2019-08-25

## 2019-08-23 RX ORDER — CLOZAPINE 150 MG/1
125 TABLET, ORALLY DISINTEGRATING ORAL
Refills: 0 | Status: COMPLETED | OUTPATIENT
Start: 2019-08-24 | End: 2019-08-25

## 2019-08-23 RX ADMIN — CLOZAPINE 125 MILLIGRAM(S): 150 TABLET, ORALLY DISINTEGRATING ORAL at 20:27

## 2019-08-23 RX ADMIN — Medication 20 MILLIGRAM(S): at 08:03

## 2019-08-23 RX ADMIN — Medication 6 UNIT(S): at 07:36

## 2019-08-23 RX ADMIN — Medication 20 MILLIGRAM(S): at 20:27

## 2019-08-23 RX ADMIN — Medication 2: at 16:25

## 2019-08-23 RX ADMIN — PIOGLITAZONE HYDROCHLORIDE 30 MILLIGRAM(S): 15 TABLET ORAL at 08:03

## 2019-08-23 RX ADMIN — INSULIN GLARGINE 25 UNIT(S): 100 INJECTION, SOLUTION SUBCUTANEOUS at 20:36

## 2019-08-23 RX ADMIN — Medication 20 MILLIGRAM(S): at 12:31

## 2019-08-23 RX ADMIN — Medication 6 UNIT(S): at 16:24

## 2019-08-23 RX ADMIN — LOSARTAN POTASSIUM 50 MILLIGRAM(S): 100 TABLET, FILM COATED ORAL at 08:03

## 2019-08-23 RX ADMIN — CLOZAPINE 100 MILLIGRAM(S): 150 TABLET, ORALLY DISINTEGRATING ORAL at 08:03

## 2019-08-23 RX ADMIN — ATORVASTATIN CALCIUM 40 MILLIGRAM(S): 80 TABLET, FILM COATED ORAL at 20:27

## 2019-08-23 NOTE — PROGRESS NOTE BEHAVIORAL HEALTH - PRN MEDS
1mg Ativan was given at 8pm last night for anxiety. 5mg Haldol was given at 2am.
1mg Ativan was given at 8pm last night for anxiety. 5mg Haldol was given at 2am.

## 2019-08-23 NOTE — PROGRESS NOTE BEHAVIORAL HEALTH - PROBLEM SELECTOR PLAN 3
- As per IM recs, continue Losartan 50mg PO Daily.  - And for Hypercholesterolemia, continue Atorvastatin 40mg PO at bedtime.
- As per IM recs, continue Losartan 50mg PO Daily.  - And for Hypercholesterolemia, continue Atorvastatin 40mg PO at bedtime.
- As per IM recs, continue Losartan 50mg PO Daily  - And for Hypercholesterolemia, continue Atorvastatin 40mg PO at bedtime
- As per IM recs, continue Losartan 50mg PO Daily.  - And for Hypercholesterolemia, continue Atorvastatin 40mg PO at bedtime.
- As per IM recs, continue Losartan 50mg PO Daily  - And for Hypercholesterolemia, continue Atorvastatin 40mg PO at bedtime

## 2019-08-23 NOTE — PROGRESS NOTE BEHAVIORAL HEALTH - PROBLEM SELECTOR PLAN 1
- Prolixin Decanoate 37.5mg given on 8/16.  - Clozapine: continue to increase by 25mg daily.  - Today: Clozapine 100mg BID; Tomorrow: Clozapine 100mg morning, 125mg at night.  - ANC was 5.9 on 8.13.19  - ANC is every 4 weeks.  - EKG (8/13): QTc - 469msec.    - PRN : Haldol 5 mg ( Agitation )  and Ativan 2 mg for anxiety  - c/w Propranolol 20mg TID for Akithisia, with the parameter to hold if BP is <120/80. - Prolixin Decanoate 37.5mg given on 8/16.  - Clozapine: continue to increase by 25mg daily.  - Today: Clozapine 100mg morning, 125mg at night; Tomorrow (8/24): 125mg BID; Sunday (8/25): 125mg morning, 150mg at night; Monday (8/26): 150mg BID.  - ANC was 5.9 on 8.13.19  - ANC is every 4 weeks.  - EKG (8/13): QTc - 469msec. (this is around her baseline).    - PRN : Haldol 5 mg for agitation.  - c/w Propranolol 20mg TID for Akithisia, with the parameter to hold if BP is <120/80.  - Ativan 1mg PRN Q12h for anxiety.

## 2019-08-23 NOTE — PROGRESS NOTE BEHAVIORAL HEALTH - NSBHFUPINTERVALHXFT_PSY_A_CORE
Patient was seen and examined, and her chart was reviewed. As per nursing, patient did not sleep at all last night. Patient endorsed that again she was unable to sleep last night due to "feeling restless," as well as having thoughts of her neice. Patient denies SI/I/P at this time. Patient was seen and examined, and her chart was reviewed. As per nursing, patient showered yesterday and has been more present out on the unit. Patient endorses sleeping a little better last night, as well as some improvement in the restlessness. Patient denies SI/I/P at this time.

## 2019-08-23 NOTE — PROGRESS NOTE BEHAVIORAL HEALTH - PROBLEM SELECTOR PROBLEM 1
Paranoid schizophrenia

## 2019-08-23 NOTE — CHART NOTE - NSCHARTNOTEFT_GEN_A_CORE
Social Work Note:    Treatment team meeting held with patient.  She was encouraged to have better ADLs.  Treatment plan, medications and discharge plan discussed.  During the day patient is observed to be isolative to her room.  She is encouraged to be visible on the unit.      No barriers to discharge identified at this time. Plan is for patient to return to [facility] on the grounds of [place] and also to resume treatment with her outpatient mental health team.  Patient is aware and agreeable to same.     At this time patient is not psychiatrically stable for discharge.

## 2019-08-23 NOTE — PROGRESS NOTE BEHAVIORAL HEALTH - PROBLEM SELECTOR PLAN 2
As per IM recommendations:  - c/w Lantus insulin 25 IU at bedtime, Pioglitazone 30mg PO daily, Humalog 3U subQ 3x/day before meals.
As per IM recommendations:  - c/w Lantus insulin 25 IU at bedtime, Pioglitazone 30mg PO daily, Humalog 3U subQ 3x/day before meals.
- As per IM recommendations:  - c/w Lantus insulin 25 IU at bedtime, Pioglitazone 30mg PO daily, Humalog 3U subQ 3x/day before meals
As per IM recommendations:  - c/w Lantus insulin 25 IU at bedtime, Pioglitazone 30mg PO daily, Humalog 3U subQ 3x/day before meals.
- As per IM recommendations:  - c/w Lantus insulin 25 IU at bedtime, Pioglitazone 30mg PO daily, Humalog 3U subQ 3x/day before meals

## 2019-08-23 NOTE — PROGRESS NOTE BEHAVIORAL HEALTH - SUMMARY
Ms Robertson is a 48yo F, living at [facility], with a history of Paranoid Schizophrenia who presented to the ED after being referred by her residence for disorganized behavior. Patient appears to have been non compliant with her Clozaril, mirtazepine and her medical medication for the past 3 days. Patient has also been non compliant with outpatient psychiatric follow up which involved blood work to monitor her neutrophil count. It is unclear what the reason for her non compliance is, particularly Clozaril - side effect versus intentionally not wanting to take psychiatric medication. In addition to this , patient is said to be increasingly disorganised in behavior and is isolating herself and is also not compliant with her medical medication. patient's outpatient psychiatrist would feel more comfortable if the titration of her Clozaril is done in an inpatient psychiatric setting.    Patient is due for her Prolixin Decanote 37.5mg on August 16, 2019.     Risk Assessment (consider static vs modifiable risk factors and protective factors; comment on level of risk for dangerous behavior): At this time, patient is considered a danger to herself and others and needs involuntary inpatient hospitalization for medication management and due to increased disorganization. Ms Robertson is a 50yo F, living at [facility], with a history of Paranoid Schizophrenia who presented to the ED after being referred by her residence for disorganized behavior. Patient appears to have been non compliant with her Clozaril, mirtazepine and her medical medication for the past 3 days. Patient has also been non compliant with outpatient psychiatric follow up which involved blood work to monitor her neutrophil count. It is unclear what the reason for her non compliance is, particularly Clozaril - side effect versus intentionally not wanting to take psychiatric medication. In addition to this, patient is said to be increasingly disorganised in behavior and is isolating herself and is also not compliant with her medical medication. patient's outpatient psychiatrist would feel more comfortable if the titration of her Clozaril is done in an inpatient psychiatric setting.    Patient was given her Prolixin Decanoate 37.5mg on August 16, 2019. Team is reconsidering the need for continued Prolixin Decanoate moving forward. Following her injection on 8/17/19, she exhibit Akithisia, for which she has been started on Propranolol 20mg TID, and this has seemed to help with her symptoms.

## 2019-08-24 LAB
GLUCOSE BLDC GLUCOMTR-MCNC: 108 MG/DL — HIGH (ref 70–99)
GLUCOSE BLDC GLUCOMTR-MCNC: 111 MG/DL — HIGH (ref 70–99)
GLUCOSE BLDC GLUCOMTR-MCNC: 118 MG/DL — HIGH (ref 70–99)
GLUCOSE BLDC GLUCOMTR-MCNC: 141 MG/DL — HIGH (ref 70–99)

## 2019-08-24 RX ADMIN — Medication 20 MILLIGRAM(S): at 20:13

## 2019-08-24 RX ADMIN — CLOZAPINE 125 MILLIGRAM(S): 150 TABLET, ORALLY DISINTEGRATING ORAL at 20:12

## 2019-08-24 RX ADMIN — Medication 20 MILLIGRAM(S): at 08:21

## 2019-08-24 RX ADMIN — ATORVASTATIN CALCIUM 40 MILLIGRAM(S): 80 TABLET, FILM COATED ORAL at 20:12

## 2019-08-24 RX ADMIN — PIOGLITAZONE HYDROCHLORIDE 30 MILLIGRAM(S): 15 TABLET ORAL at 08:21

## 2019-08-24 RX ADMIN — Medication 20 MILLIGRAM(S): at 13:04

## 2019-08-24 RX ADMIN — Medication 1 MILLIGRAM(S): at 03:54

## 2019-08-24 RX ADMIN — LOSARTAN POTASSIUM 50 MILLIGRAM(S): 100 TABLET, FILM COATED ORAL at 08:20

## 2019-08-24 RX ADMIN — Medication 6 UNIT(S): at 17:11

## 2019-08-25 LAB
GLUCOSE BLDC GLUCOMTR-MCNC: 109 MG/DL — HIGH (ref 70–99)
GLUCOSE BLDC GLUCOMTR-MCNC: 128 MG/DL — HIGH (ref 70–99)
GLUCOSE BLDC GLUCOMTR-MCNC: 162 MG/DL — HIGH (ref 70–99)
GLUCOSE BLDC GLUCOMTR-MCNC: 229 MG/DL — HIGH (ref 70–99)
GLUCOSE BLDC GLUCOMTR-MCNC: 265 MG/DL — HIGH (ref 70–99)

## 2019-08-25 RX ADMIN — Medication 6 UNIT(S): at 11:59

## 2019-08-25 RX ADMIN — LOSARTAN POTASSIUM 50 MILLIGRAM(S): 100 TABLET, FILM COATED ORAL at 08:13

## 2019-08-25 RX ADMIN — Medication 1: at 12:00

## 2019-08-25 RX ADMIN — INSULIN GLARGINE 25 UNIT(S): 100 INJECTION, SOLUTION SUBCUTANEOUS at 21:00

## 2019-08-25 RX ADMIN — PIOGLITAZONE HYDROCHLORIDE 30 MILLIGRAM(S): 15 TABLET ORAL at 08:14

## 2019-08-25 RX ADMIN — Medication 20 MILLIGRAM(S): at 12:33

## 2019-08-25 RX ADMIN — ATORVASTATIN CALCIUM 40 MILLIGRAM(S): 80 TABLET, FILM COATED ORAL at 21:00

## 2019-08-25 RX ADMIN — CLOZAPINE 125 MILLIGRAM(S): 150 TABLET, ORALLY DISINTEGRATING ORAL at 08:14

## 2019-08-25 RX ADMIN — Medication 20 MILLIGRAM(S): at 08:13

## 2019-08-25 RX ADMIN — CLOZAPINE 150 MILLIGRAM(S): 150 TABLET, ORALLY DISINTEGRATING ORAL at 20:59

## 2019-08-26 LAB
GLUCOSE BLDC GLUCOMTR-MCNC: 134 MG/DL — HIGH (ref 70–99)
GLUCOSE BLDC GLUCOMTR-MCNC: 159 MG/DL — HIGH (ref 70–99)
GLUCOSE BLDC GLUCOMTR-MCNC: 210 MG/DL — HIGH (ref 70–99)
GLUCOSE BLDC GLUCOMTR-MCNC: 215 MG/DL — HIGH (ref 70–99)
GLUCOSE BLDC GLUCOMTR-MCNC: 253 MG/DL — HIGH (ref 70–99)

## 2019-08-26 RX ORDER — CLOZAPINE 150 MG/1
175 TABLET, ORALLY DISINTEGRATING ORAL
Refills: 0 | Status: DISCONTINUED | OUTPATIENT
Start: 2019-08-26 | End: 2019-08-27

## 2019-08-26 RX ADMIN — Medication 2: at 11:40

## 2019-08-26 RX ADMIN — Medication 3: at 16:26

## 2019-08-26 RX ADMIN — Medication 20 MILLIGRAM(S): at 13:17

## 2019-08-26 RX ADMIN — ATORVASTATIN CALCIUM 40 MILLIGRAM(S): 80 TABLET, FILM COATED ORAL at 20:25

## 2019-08-26 RX ADMIN — Medication 6 UNIT(S): at 08:10

## 2019-08-26 RX ADMIN — Medication 6 UNIT(S): at 11:40

## 2019-08-26 RX ADMIN — Medication 20 MILLIGRAM(S): at 08:11

## 2019-08-26 RX ADMIN — LOSARTAN POTASSIUM 50 MILLIGRAM(S): 100 TABLET, FILM COATED ORAL at 08:11

## 2019-08-26 RX ADMIN — Medication 6 UNIT(S): at 16:26

## 2019-08-26 RX ADMIN — PIOGLITAZONE HYDROCHLORIDE 30 MILLIGRAM(S): 15 TABLET ORAL at 08:13

## 2019-08-26 RX ADMIN — CLOZAPINE 150 MILLIGRAM(S): 150 TABLET, ORALLY DISINTEGRATING ORAL at 08:11

## 2019-08-26 RX ADMIN — CLOZAPINE 175 MILLIGRAM(S): 150 TABLET, ORALLY DISINTEGRATING ORAL at 20:25

## 2019-08-26 RX ADMIN — Medication 20 MILLIGRAM(S): at 20:25

## 2019-08-26 RX ADMIN — INSULIN GLARGINE 25 UNIT(S): 100 INJECTION, SOLUTION SUBCUTANEOUS at 20:26

## 2019-08-26 NOTE — PROGRESS NOTE BEHAVIORAL HEALTH - NS ED BHA MED ROS GASTROINTESTINAL
No complaints I will SWITCH the dose or number of times a day I take the medications listed below when I get home from the hospital:  None

## 2019-08-26 NOTE — PROGRESS NOTE BEHAVIORAL HEALTH - NSBHFUPINTERVALHXFT_PSY_A_CORE
Chart reviewed, pt seen and examined at bedside. Per nursing no behavioral issues, patient medication compliant, PM dose of inderal held /57. Upon approach, patient seen laying in bed, endorses lethargy. Patient encouraged to attend to ADLs and attend group. Patient endorses AH, patient however does not appear internally occupied or RTIS. She states she is not bothered by the AH that tell her to "be calm." Patient complains of constipation with last BM on Thursday. Otherwise she denies medication side effectsand has no other complaints. She denies SI/HI/VH. Chart reviewed, pt seen and examined at bedside. Per nursing no behavioral issues, patient medication compliant, PM dose of inderal held /57. Upon approach, patient seen laying in bed, endorses lethargy. Patient encouraged to attend to ADLs and attend group. Patient endorses AH, patient however does not appear internally occupied or RTIS. She states she is not bothered by the AH that tell her to "be calm." Patient complains of constipation with last BM on Thursday. Otherwise she denies medication side effects and has no other complaints. She denies SI/HI/VH.

## 2019-08-26 NOTE — CHART NOTE - NSCHARTNOTEFT_GEN_A_CORE
Social Work Note:    Treatment team met with patient.  ADLs are fair.  Treatment plan, medications and discharge plan discussed.  During the day patient is observed to be isolative to her room but she is visible with encouragement.  She is encouraged to be visible on the unit, sit in the day room and attend groups that are offered.      No barriers to discharge identified at this time. Plan is for patient to return to [facility] on the grounds of [place] and also to resume treatment with her outpatient mental health team.  Patient is aware and agreeable to same.     At this time patient is not psychiatrically stable for discharge.

## 2019-08-26 NOTE — PROGRESS NOTE BEHAVIORAL HEALTH - SUMMARY
Ms Robertson is a 50yo F, living at [facility], with a history of Paranoid Schizophrenia who presented to the ED after being referred by her residence for disorganized behavior. Patient appears to have been non compliant with her Clozaril, mirtazepine and her medical medication for the past 3 days. Patient has also been non compliant with outpatient psychiatric follow up which involved blood work to monitor her neutrophil count. It is unclear what the reason for her non compliance is, particularly Clozaril - side effect versus intentionally not wanting to take psychiatric medication. In addition to this, patient is said to be increasingly disorganised in behavior and is isolating herself and is also not compliant with her medical medication. patient's outpatient psychiatrist would feel more comfortable if the titration of her Clozaril is done in an inpatient psychiatric setting.    Upon reassessment, patient continues to endorse AH, presents withdrawn, and not attending to ADLS. Ms Robertson is a 48yo F, living at [facility], with a history of Paranoid Schizophrenia who presented to the ED after being referred by her residence for disorganized behavior. Patient appears to have been non compliant with her Clozaril, mirtazepine and her medical medication for the past 3 days. Patient has also been non compliant with outpatient psychiatric follow up which involved blood work to monitor her neutrophil count. It is unclear what the reason for her non compliance is, particularly Clozaril - side effect versus intentionally not wanting to take psychiatric medication. In addition to this, patient is said to be increasingly disorganised in behavior and is isolating herself and is also not compliant with her medical medication. patient's outpatient psychiatrist would feel more comfortable if the titration of her Clozaril is done in an inpatient psychiatric setting.    Upon reassessment, patient continues to endorse AH, presents withdrawn, concrete, and not attending to ADLS. Patient is currently danger to self and unable to care for self. She thus requires further psychiatric stabilization, medication management/ treatement requiring further IPP hospitalization. Will continue to optimize patient's current medication regimen.     #Paranoid Schizophrenia  -continue with clozapine titration, increased to 175 po BID  -Prolixin dec given on 8/16  -ast ANC 8/13 5900, next due 9/10/19  -EKG on 8/13 WNL qtc 469 msec which is patient's baseline  - c/w Propranolol 20mg TID for Akithisia  - c/w Ativan 1mg PRN Q12h for anxiety.    #severe agitaiton  -for severe agitation no amenable to verbal redirection give haldol 5 mg po PRN q6 with escalation to IM if patient danger to self/others Ms Robertson is a 50yo F, living at [facility], with a history of Paranoid Schizophrenia who presented to the ED after being referred by her residence for disorganized behavior. Patient appears to have been non compliant with her Clozaril, mirtazepine and her medical medication for the past 3 days. Patient has also been non compliant with outpatient psychiatric follow up which involved blood work to monitor her neutrophil count. It is unclear what the reason for her non compliance is, particularly Clozaril - side effect versus intentionally not wanting to take psychiatric medication. In addition to this, patient is said to be increasingly disorganised in behavior and is isolating herself and is also not compliant with her medical medication. patient's outpatient psychiatrist would feel more comfortable if the titration of her Clozaril is done in an inpatient psychiatric setting.    Upon reassessment, patient continues to endorse AH, presents withdrawn, concrete, and not attending to ADLS. Patient is currently danger to self and unable to care for self. She thus requires further psychiatric stabilization, medication management/ treatement requiring further IPP hospitalization. Will continue to optimize patient's current medication regimen.     #Paranoid Schizophrenia  -continue with clozapine titration, increased to 175 po BID  -Prolixin dec 37.5mg given on 8/16  -last ANC 8/13 5900, next due 9/10/19  -EKG on 8/13 WNL qtc 469 msec which is patient's baseline  - c/w Propranolol 20mg TID for Akithisia  - c/w Ativan 1mg PRN Q12h for anxiety.    #severe agitaiton  -for severe agitation no amenable to verbal redirection give haldol 5 mg po PRN q6 with escalation to IM if patient danger to self/others    #DM  -as per IM recs, c/w lantus insulin 25 IU at bedtime, Pioglitazone 30mg PO daily, Humalog 3U subQ 3x/day before meals.       #HTN  -as per IM recs, c/w losartan 50mg PO Daily      #HLD  -as per IM recs, c/w atorvastatin 40mg po qhs Ms Robertson is a 50yo F, living at [facility], with a history of Paranoid Schizophrenia who presented to the ED after being referred by her residence for disorganized behavior. Patient appears to have been non compliant with her Clozaril, mirtazepine and her medical medication for the past 3 days. Patient has also been non compliant with outpatient psychiatric follow up which involved blood work to monitor her neutrophil count. It is unclear what the reason for her non compliance is, particularly Clozaril - side effect versus intentionally not wanting to take psychiatric medication. In addition to this, patient is said to be increasingly disorganised in behavior and is isolating herself and is also not compliant with her medical medication. patient's outpatient psychiatrist would feel more comfortable if the titration of her Clozaril is done in an inpatient psychiatric setting.    Upon reassessment, patient continues to endorse AH, presents withdrawn, concrete, and not attending to ADLS. Patient is currently danger to self and unable to care for self. She thus requires further psychiatric stabilization, medication management/ treatement requiring further IPP hospitalization. Will continue to optimize patient's current medication regimen.     #Paranoid Schizophrenia  -continue with clozapine titration, increased to 175 po BID  -Prolixin dec 37.5mg given on 8/16  -last ANC 8/13 5900, next due 9/10/19  -EKG on 8/13 WNL qtc 469 msec which is patient's baseline  - c/w Propranolol 20mg TID for Akithisia  - c/w Ativan 1mg PRN Q12h for anxiety.    #severe agitaiton  -for severe agitation no amenable to verbal redirection give prolixin 5 mg po PRN q6 with escalation to IM if patient danger to self/others    #DM  -as per IM recs, c/w lantus insulin 25 IU at bedtime, Pioglitazone 30mg PO daily, Humalog 3U subQ 3x/day before meals.       #HTN  -as per IM recs, c/w losartan 50mg PO Daily      #HLD  -as per IM recs, c/w atorvastatin 40mg po qhs

## 2019-08-26 NOTE — PROGRESS NOTE BEHAVIORAL HEALTH - RISK ASSESSMENT
Patient cannot contract for safety at this time and warrants IPP hospitalization for safety, stabilization and medication management. Pt. has chronic risk , however currently she is adherent to meds, no sub abuse, she attends groups. she denied any SI or plans.

## 2019-08-26 NOTE — PROGRESS NOTE BEHAVIORAL HEALTH - NSBHCHARTREVIEWVS_PSY_A_CORE FT
ICU Vital Signs Last 24 Hrs  T(C): 36.2 (26 Aug 2019 05:48), Max: 36.2 (26 Aug 2019 05:48)  T(F): 97.1 (26 Aug 2019 05:48), Max: 97.1 (26 Aug 2019 05:48)  HR: 84 (26 Aug 2019 05:48) (84 - 95)  BP: 117/74 (26 Aug 2019 05:48) (111/57 - 117/74)  BP(mean): --  ABP: --  ABP(mean): --  RR: 20 (26 Aug 2019 05:48) (20 - 20)  SpO2: --

## 2019-08-26 NOTE — PROGRESS NOTE ADULT - PROBLEM SELECTOR PROBLEM 3
MEDICINE, PROGRESS NOTE [phone]    FRAN ALEXANDRA 70y [id number]    Patient seen and examined.  Patient is a 70y old  Female who presents with a chief complaint of shortness of breath and difficulty eating due to fullness (01 Mar 2018 19:30)      PAST MEDICAL & SURGICAL HISTORY:  Hyperthyroidism  JOSE (obstructive sleep apnea)  Epistaxis  GIB (gastrointestinal bleeding)  CAD S/P percutaneous coronary angioplasty  Afib  Pulmonary HTN  GERD (gastroesophageal reflux disease)  Obesity  Cardiomegaly  Valvular heart disease  COPD (chronic obstructive pulmonary disease): Home O2  Sleep Apnea: by criteria  Loose, teeth: 3 bottom front loose teeth  Female stress incontinence  Shoulder pain, right  Constipation  Arthritis of Knee  H/O heartburn  History of lung cancer  Obese  Hx of hyperlipidemia  Asthma  Diabetes mellitus: type 2  dx about 4-5 years ago   no daily fingerstick  H/O: HTN (hypertension)  Enlarged lymph nodes  Lymph nodes enlarged: s/p biopsy mediastinal  benign  H/O endoscopy  left upper lobectomy    MEDICATIONS  (STANDING):  ALBUTerol/ipratropium for Nebulization 3 milliLiter(s) Nebulizer every 6 hours  aspirin enteric coated 81 milliGRAM(s) Oral daily  atorvastatin 20 milliGRAM(s) Oral at bedtime  BACItracin   Ointment 1 Application(s) Topical two times a day  buDESOnide   0.5 milliGRAM(s) Respule 0.5 milliGRAM(s) Inhalation every 12 hours  clopidogrel Tablet 75 milliGRAM(s) Oral daily  digoxin     Tablet 0.125 milliGRAM(s) Oral every other day  diltiazem    milliGRAM(s) Oral daily  heparin  Injectable 5000 Unit(s) SubCutaneous every 8 hours  methimazole 5 milliGRAM(s) Oral daily  metoprolol     tartrate 25 milliGRAM(s) Oral every 8 hours  predniSONE   Tablet 10 milliGRAM(s) Oral daily  tiotropium 2.5 MICROgram(s)/olodaterol 2.5 MICROgram(s) Inhaler 2 Puff(s) Inhalation daily    MEDICATIONS  (PRN):  acetaminophen   Tablet. 650 milliGRAM(s) Oral every 6 hours PRN Mild Pain (1 - 3)  docusate sodium 100 milliGRAM(s) Oral two times a day PRN Constipation  Gas-X extra strength (simethicone 125 mg 1 Tablet(s) 1 Tablet(s) Chew three times a day PRN bloating  ondansetron Injectable 4 milliGRAM(s) IV Push every 8 hours PRN Nausea and/or Vomiting  sodium chloride 0.65% Nasal 1 Spray(s) Both Nostrils three times a day PRN Nasal Congestion  sucralfate 1 Gram(s) Oral three times a day PRN stomach discomfort    Allergies    No Known Allergies    Intolerances    albuterol (Unknown)      PHYSICAL EXAM:  Constitutional: NAD  HEENT: Normocephalic, EOMI  Neck:  No JVD  Respiratory: CTA B/L, No wheezes  Cardiovascular: S1, S2, RRR, + systolic murmur  Gastrointestinal: BS+, soft, NT/ND  Extremities: No peripheral edema  Neurological: AAOX3, no focal deficits  Psychiatric: Normal mood, normal affect  : No Gottlieb    Vital Signs Last 24 Hrs  T(C): 36.7 (19 Mar 2018 13:56), Max: 36.7 (19 Mar 2018 13:56)  T(F): 98 (19 Mar 2018 13:56), Max: 98 (19 Mar 2018 13:56)  HR: 80 (19 Mar 2018 13:56) (68 - 124)  BP: 88/51 (19 Mar 2018 13:56) (88/51 - 111/74)  BP(mean): --  RR: 17 (19 Mar 2018 13:56) (16 - 18)  SpO2: 94% (19 Mar 2018 13:56) (94% - 95%)  I&O's Summary    18 Mar 2018 07:01  -  19 Mar 2018 07:00  --------------------------------------------------------  IN: 600 mL / OUT: 650 mL / NET: -50 mL        LABS:                        9.5    7.7   )-----------( 193      ( 19 Mar 2018 10:37 )             29.6     03-19    133<L>  |  94<L>  |  118<H>  ----------------------------<  195<H>  4.9   |  26  |  3.24<H>    Ca    8.4      19 Mar 2018 10:04 Paranoid schizophrenia

## 2019-08-27 LAB
GLUCOSE BLDC GLUCOMTR-MCNC: 122 MG/DL — HIGH (ref 70–99)
GLUCOSE BLDC GLUCOMTR-MCNC: 153 MG/DL — HIGH (ref 70–99)
GLUCOSE BLDC GLUCOMTR-MCNC: 179 MG/DL — HIGH (ref 70–99)
GLUCOSE BLDC GLUCOMTR-MCNC: 222 MG/DL — HIGH (ref 70–99)

## 2019-08-27 RX ORDER — SENNA PLUS 8.6 MG/1
2 TABLET ORAL AT BEDTIME
Refills: 0 | Status: DISCONTINUED | OUTPATIENT
Start: 2019-08-27 | End: 2019-09-05

## 2019-08-27 RX ORDER — CLOZAPINE 150 MG/1
200 TABLET, ORALLY DISINTEGRATING ORAL
Refills: 0 | Status: DISCONTINUED | OUTPATIENT
Start: 2019-08-27 | End: 2019-08-29

## 2019-08-27 RX ORDER — DOCUSATE SODIUM 100 MG
100 CAPSULE ORAL THREE TIMES A DAY
Refills: 0 | Status: DISCONTINUED | OUTPATIENT
Start: 2019-08-27 | End: 2019-09-05

## 2019-08-27 RX ADMIN — INSULIN GLARGINE 25 UNIT(S): 100 INJECTION, SOLUTION SUBCUTANEOUS at 20:10

## 2019-08-27 RX ADMIN — Medication 100 MILLIGRAM(S): at 11:37

## 2019-08-27 RX ADMIN — Medication 20 MILLIGRAM(S): at 20:09

## 2019-08-27 RX ADMIN — Medication 100 MILLIGRAM(S): at 20:09

## 2019-08-27 RX ADMIN — ATORVASTATIN CALCIUM 40 MILLIGRAM(S): 80 TABLET, FILM COATED ORAL at 20:09

## 2019-08-27 RX ADMIN — CLOZAPINE 200 MILLIGRAM(S): 150 TABLET, ORALLY DISINTEGRATING ORAL at 20:09

## 2019-08-27 RX ADMIN — Medication 20 MILLIGRAM(S): at 09:29

## 2019-08-27 RX ADMIN — PIOGLITAZONE HYDROCHLORIDE 30 MILLIGRAM(S): 15 TABLET ORAL at 09:29

## 2019-08-27 RX ADMIN — Medication 1: at 11:27

## 2019-08-27 RX ADMIN — Medication 1: at 16:52

## 2019-08-27 RX ADMIN — Medication 6 UNIT(S): at 11:26

## 2019-08-27 RX ADMIN — SENNA PLUS 2 TABLET(S): 8.6 TABLET ORAL at 20:10

## 2019-08-27 RX ADMIN — LOSARTAN POTASSIUM 50 MILLIGRAM(S): 100 TABLET, FILM COATED ORAL at 09:29

## 2019-08-27 RX ADMIN — Medication 6 UNIT(S): at 16:51

## 2019-08-27 RX ADMIN — CLOZAPINE 175 MILLIGRAM(S): 150 TABLET, ORALLY DISINTEGRATING ORAL at 09:29

## 2019-08-27 NOTE — PROGRESS NOTE BEHAVIORAL HEALTH - NSBHCHARTREVIEWVS_PSY_A_CORE FT
ICU Vital Signs Last 24 Hrs  T(C): 36.8 (27 Aug 2019 08:34), Max: 36.8 (27 Aug 2019 08:34)  T(F): 98.2 (27 Aug 2019 08:34), Max: 98.2 (27 Aug 2019 08:34)  HR: 69 (27 Aug 2019 08:34) (69 - 91)  BP: 143/67 (27 Aug 2019 08:34) (114/73 - 143/67)  BP(mean): --  ABP: --  ABP(mean): --  RR: 18 (27 Aug 2019 08:34) (18 - 20)  SpO2: --

## 2019-08-27 NOTE — PROGRESS NOTE BEHAVIORAL HEALTH - RISK ASSESSMENT
Pt. has chronic risk , however currently she is adherent to meds, no sub abuse, she attends groups. she denied any SI or plans.

## 2019-08-27 NOTE — PROGRESS NOTE BEHAVIORAL HEALTH - NSBHFUPINTERVALHXFT_PSY_A_CORE
Chart reviewed, pt seen and examined at bedside. Per nursing staff patient improving less disorganized, sleeping well. Upon approach, patient laying in bed, appear disheveled. Patient denies AH and states she does not remember the last time she heard voices. She states her last BM was 3 days ago. Patient is encouraged to attend to ADLs and attend groups/activities and encouraged to leave her room more often. She denies SI/HI/AVH. Otherwise patient has no complaints and denies any adverse medication side effects.

## 2019-08-27 NOTE — PROGRESS NOTE BEHAVIORAL HEALTH - SUMMARY
Ms Robertson is a 48yo F, living at [facility], with a history of Paranoid Schizophrenia who presented to the ED after being referred by her residence for disorganized behavior. Patient appears to have been non compliant with her Clozaril, mirtazepine and her medical medication for the past 3 days. Patient has also been non compliant with outpatient psychiatric follow up which involved blood work to monitor her neutrophil count. It is unclear what the reason for her non compliance is, particularly Clozaril - side effect versus intentionally not wanting to take psychiatric medication. In addition to this, patient is said to be increasingly disorganised in behavior and is isolating herself and is also not compliant with her medical medication. patient's outpatient psychiatrist would feel more comfortable if the titration of her Clozaril is done in an inpatient psychiatric setting.    Upon reassessment, patient denies AH and does not appear to be RTIS or internally preoccupied, however she continues to present withdrawn with blunted affect, concrete thought process, not attending to ADLS. Patient is currently danger to self and unable to care for self. She thus requires further psychiatric stabilization, medication management/ treatement requiring further IPP hospitalization. Will continue to optimize patient's current medication regimen.     #Paranoid Schizophrenia  -continue with clozapine titration, increased to 200 po BID  -Prolixin dec 37.5mg given on 8/16  -last ANC 8/13 5900, next due 9/10/19  -EKG on 8/13 WNL qtc 469 msec which is patient's baseline  - c/w Propranolol 20mg TID for Akithisia  - c/w Ativan 1mg PRN Q12h for anxiety.    #severe agitation  -for severe agitation no amenable to verbal redirection give prolixin 5 mg po PRN q6, benadryl 25mg po PRN q6 with escalation to IM if patient danger to self/others    #DM  -as per IM recs, c/w lantus insulin 25 IU at bedtime, Pioglitazone 30mg PO daily, Humalog 3U subQ 3x/day before meals.       #HTN  -as per IM recs, c/w losartan 50mg PO Daily      #HLD  -as per IM recs, c/w atorvastatin 40mg po qhs Ms Robertson is a 50yo F, living at [facility], with a history of Paranoid Schizophrenia who presented to the ED after being referred by her residence for disorganized behavior. Patient appears to have been non compliant with her Clozaril, mirtazepine and her medical medication for the past 3 days. Patient has also been non compliant with outpatient psychiatric follow up which involved blood work to monitor her neutrophil count. It is unclear what the reason for her non compliance is, particularly Clozaril - side effect versus intentionally not wanting to take psychiatric medication. In addition to this, patient is said to be increasingly disorganised in behavior and is isolating herself and is also not compliant with her medical medication. patient's outpatient psychiatrist would feel more comfortable if the titration of her Clozaril is done in an inpatient psychiatric setting.    Upon reassessment, patient denies AH and does not appear to be RTIS or internally preoccupied, however she continues to present withdrawn with blunted affect, concrete thought process, not attending to ADLS. Patient is currently danger to self and unable to care for self. She thus requires further psychiatric stabilization, medication management/ treatement requiring further IPP hospitalization. Will continue to optimize patient's current medication regimen.     #Paranoid Schizophrenia  -continue with clozapine titration, increased to 200 po BID  -Prolixin dec 37.5mg given on 8/16  -last ANC 8/13 5900, next due 9/10/19  -EKG on 8/13 WNL qtc 469 msec which is patient's baseline  - c/w Propranolol 20mg TID for Akithisia  - c/w Ativan 1mg PRN Q12h for anxiety.    #Constipation  -start colace 100mg po TID  -start senna 2 tablets po QHS    #severe agitation  -for severe agitation no amenable to verbal redirection give prolixin 5 mg po PRN q6, benadryl 25mg po PRN q6 with escalation to IM if patient danger to self/others    #DM  -as per IM recs, c/w lantus insulin 25 IU at bedtime, Pioglitazone 30mg PO daily, Humalog 3U subQ 3x/day before meals.       #HTN  -as per IM recs, c/w losartan 50mg PO Daily      #HLD  -as per IM recs, c/w atorvastatin 40mg po qhs

## 2019-08-27 NOTE — PROGRESS NOTE BEHAVIORAL HEALTH - NSBHCHARTREVIEWLAB_PSY_A_CORE FT
Complete Blood Count + Automated Diff (08.13.19 @ 15:21)    WBC Count: 8.53 K/uL    RBC Count: 4.68 M/uL    Hemoglobin: 13.1 g/dL    Hematocrit: 41.6 %    Mean Cell Volume: 88.9 fL    Mean Cell Hemoglobin: 28.0 pg    Mean Cell Hemoglobin Conc: 31.5 g/dL    Red Cell Distrib Width: 13.4 %    Platelet Count - Automated: 387 K/uL    Auto Neutrophil #: 5.98 K/uL    Auto Lymphocyte #: 1.67 K/uL    Auto Monocyte #: 0.55 K/uL    Auto Eosinophil #: 0.23 K/uL    Auto Basophil #: 0.06 K/uL    Auto Neutrophil %: 70.1: Differential percentages must be correlated with absolute numbers for  clinical significance. %    Auto Lymphocyte %: 19.6 %    Auto Monocyte %: 6.4 %    Auto Eosinophil %: 2.7 %    Auto Basophil %: 0.7 %    Auto Immature Granulocyte %: 0.5 %    Nucleated RBC: 0 /100 WBCs
Complete Blood Count + Automated Diff (08.13.19 @ 15:21)    WBC Count: 8.53 K/uL    RBC Count: 4.68 M/uL    Hemoglobin: 13.1 g/dL    Hematocrit: 41.6 %    Mean Cell Volume: 88.9 fL    Mean Cell Hemoglobin: 28.0 pg    Mean Cell Hemoglobin Conc: 31.5 g/dL    Red Cell Distrib Width: 13.4 %    Platelet Count - Automated: 387 K/uL    Auto Neutrophil #: 5.98 K/uL    Auto Lymphocyte #: 1.67 K/uL    Auto Monocyte #: 0.55 K/uL    Auto Eosinophil #: 0.23 K/uL    Auto Basophil #: 0.06 K/uL    Auto Neutrophil %: 70.1: Differential percentages must be correlated with absolute numbers for  clinical significance. %    Auto Lymphocyte %: 19.6 %    Auto Monocyte %: 6.4 %    Auto Eosinophil %: 2.7 %    Auto Basophil %: 0.7 %    Auto Immature Granulocyte %: 0.5 %    Nucleated RBC: 0 /100 WBCs  Comprehensive Metabolic Panel (06.01.19 @ 07:27)    Sodium, Serum: 141 mmol/L    Potassium, Serum: 4.1 mmol/L    Chloride, Serum: 107 mmol/L    Carbon Dioxide, Serum: 20 mmol/L    Anion Gap, Serum: 14 mmol/L    Blood Urea Nitrogen, Serum: 11 mg/dL    Creatinine, Serum: 0.8 mg/dL    Glucose, Serum: 87 mg/dL    Calcium, Total Serum: 9.6 mg/dL    Protein Total, Serum: 6.1 g/dL    Albumin, Serum: 3.4 g/dL    Bilirubin Total, Serum: 0.4 mg/dL    Alkaline Phosphatase, Serum: 93 U/L    Aspartate Aminotransferase (AST/SGOT): 23 U/L    Alanine Aminotransferase (ALT/SGPT): 16 U/L    eGFR if Non : 87  Lipid Profile in AM (08.15.19 @ 07:50)    Total Cholesterol/HDL Ratio Measurement: 3.6 Ratio    Cholesterol, Serum: 134 mg/dL    Triglycerides, Serum: 146 mg/dL    HDL Cholesterol, Serum: 37    Direct LDL: 88  Thyroid Stimulating Hormone, Serum in AM (06.01.19 @ 07:27)    Thyroid Stimulating Hormone, Serum: 1.23 uIU/mL
Complete Blood Count + Automated Diff (08.13.19 @ 15:21)    WBC Count: 8.53 K/uL    RBC Count: 4.68 M/uL    Hemoglobin: 13.1 g/dL    Hematocrit: 41.6 %    Mean Cell Volume: 88.9 fL    Mean Cell Hemoglobin: 28.0 pg    Mean Cell Hemoglobin Conc: 31.5 g/dL    Red Cell Distrib Width: 13.4 %    Platelet Count - Automated: 387 K/uL    Auto Neutrophil #: 5.98 K/uL    Auto Lymphocyte #: 1.67 K/uL    Auto Monocyte #: 0.55 K/uL    Auto Eosinophil #: 0.23 K/uL    Auto Basophil #: 0.06 K/uL    Auto Neutrophil %: 70.1: Differential percentages must be correlated with absolute numbers for  clinical significance. %    Auto Lymphocyte %: 19.6 %    Auto Monocyte %: 6.4 %    Auto Eosinophil %: 2.7 %    Auto Basophil %: 0.7 %    Auto Immature Granulocyte %: 0.5 %    Nucleated RBC: 0 /100 WBCs  Comprehensive Metabolic Panel (06.01.19 @ 07:27)    Sodium, Serum: 141 mmol/L    Potassium, Serum: 4.1 mmol/L    Chloride, Serum: 107 mmol/L    Carbon Dioxide, Serum: 20 mmol/L    Anion Gap, Serum: 14 mmol/L    Blood Urea Nitrogen, Serum: 11 mg/dL    Creatinine, Serum: 0.8 mg/dL    Glucose, Serum: 87 mg/dL    Calcium, Total Serum: 9.6 mg/dL    Protein Total, Serum: 6.1 g/dL    Albumin, Serum: 3.4 g/dL    Bilirubin Total, Serum: 0.4 mg/dL    Alkaline Phosphatase, Serum: 93 U/L    Aspartate Aminotransferase (AST/SGOT): 23 U/L    Alanine Aminotransferase (ALT/SGPT): 16 U/L    eGFR if Non : 87  Lipid Profile in AM (08.15.19 @ 07:50)    Total Cholesterol/HDL Ratio Measurement: 3.6 Ratio    Cholesterol, Serum: 134 mg/dL    Triglycerides, Serum: 146 mg/dL    HDL Cholesterol, Serum: 37    Direct LDL: 88  Thyroid Stimulating Hormone, Serum in AM (06.01.19 @ 07:27)    Thyroid Stimulating Hormone, Serum: 1.23 uIU/mL
Complete Blood Count + Automated Diff (08.13.19 @ 15:21)    WBC Count: 8.53 K/uL    RBC Count: 4.68 M/uL    Hemoglobin: 13.1 g/dL    Hematocrit: 41.6 %    Mean Cell Volume: 88.9 fL    Mean Cell Hemoglobin: 28.0 pg    Mean Cell Hemoglobin Conc: 31.5 g/dL    Red Cell Distrib Width: 13.4 %    Platelet Count - Automated: 387 K/uL    Auto Neutrophil #: 5.98 K/uL    Auto Lymphocyte #: 1.67 K/uL    Auto Monocyte #: 0.55 K/uL    Auto Eosinophil #: 0.23 K/uL    Auto Basophil #: 0.06 K/uL    Auto Neutrophil %: 70.1: Differential percentages must be correlated with absolute numbers for  clinical significance. %    Auto Lymphocyte %: 19.6 %    Auto Monocyte %: 6.4 %    Auto Eosinophil %: 2.7 %    Auto Basophil %: 0.7 %    Auto Immature Granulocyte %: 0.5 %    Nucleated RBC: 0 /100 WBCs
Complete Blood Count + Automated Diff (08.13.19 @ 15:21)    WBC Count: 8.53 K/uL    RBC Count: 4.68 M/uL    Hemoglobin: 13.1 g/dL    Hematocrit: 41.6 %    Mean Cell Volume: 88.9 fL    Mean Cell Hemoglobin: 28.0 pg    Mean Cell Hemoglobin Conc: 31.5 g/dL    Red Cell Distrib Width: 13.4 %    Platelet Count - Automated: 387 K/uL    Auto Neutrophil #: 5.98 K/uL    Auto Lymphocyte #: 1.67 K/uL    Auto Monocyte #: 0.55 K/uL    Auto Eosinophil #: 0.23 K/uL    Auto Basophil #: 0.06 K/uL    Auto Neutrophil %: 70.1: Differential percentages must be correlated with absolute numbers for  clinical significance. %    Auto Lymphocyte %: 19.6 %    Auto Monocyte %: 6.4 %    Auto Eosinophil %: 2.7 %    Auto Basophil %: 0.7 %    Auto Immature Granulocyte %: 0.5 %    Nucleated RBC: 0 /100 WBCs  Comprehensive Metabolic Panel (06.01.19 @ 07:27)    Sodium, Serum: 141 mmol/L    Potassium, Serum: 4.1 mmol/L    Chloride, Serum: 107 mmol/L    Carbon Dioxide, Serum: 20 mmol/L    Anion Gap, Serum: 14 mmol/L    Blood Urea Nitrogen, Serum: 11 mg/dL    Creatinine, Serum: 0.8 mg/dL    Glucose, Serum: 87 mg/dL    Calcium, Total Serum: 9.6 mg/dL    Protein Total, Serum: 6.1 g/dL    Albumin, Serum: 3.4 g/dL    Bilirubin Total, Serum: 0.4 mg/dL    Alkaline Phosphatase, Serum: 93 U/L    Aspartate Aminotransferase (AST/SGOT): 23 U/L    Alanine Aminotransferase (ALT/SGPT): 16 U/L    eGFR if Non : 87  Lipid Profile in AM (08.15.19 @ 07:50)    Total Cholesterol/HDL Ratio Measurement: 3.6 Ratio    Cholesterol, Serum: 134 mg/dL    Triglycerides, Serum: 146 mg/dL    HDL Cholesterol, Serum: 37    Direct LDL: 88  Thyroid Stimulating Hormone, Serum in AM (06.01.19 @ 07:27)    Thyroid Stimulating Hormone, Serum: 1.23 uIU/mL
Complete Blood Count + Automated Diff (08.13.19 @ 15:21)    WBC Count: 8.53 K/uL    RBC Count: 4.68 M/uL    Hemoglobin: 13.1 g/dL    Hematocrit: 41.6 %    Mean Cell Volume: 88.9 fL    Mean Cell Hemoglobin: 28.0 pg    Mean Cell Hemoglobin Conc: 31.5 g/dL    Red Cell Distrib Width: 13.4 %    Platelet Count - Automated: 387 K/uL    Auto Neutrophil #: 5.98 K/uL    Auto Lymphocyte #: 1.67 K/uL    Auto Monocyte #: 0.55 K/uL    Auto Eosinophil #: 0.23 K/uL    Auto Basophil #: 0.06 K/uL    Auto Neutrophil %: 70.1: Differential percentages must be correlated with absolute numbers for  clinical significance. %    Auto Lymphocyte %: 19.6 %    Auto Monocyte %: 6.4 %    Auto Eosinophil %: 2.7 %    Auto Basophil %: 0.7 %    Auto Immature Granulocyte %: 0.5 %    Nucleated RBC: 0 /100 WBCs  Comprehensive Metabolic Panel (06.01.19 @ 07:27)    Sodium, Serum: 141 mmol/L    Potassium, Serum: 4.1 mmol/L    Chloride, Serum: 107 mmol/L    Carbon Dioxide, Serum: 20 mmol/L    Anion Gap, Serum: 14 mmol/L    Blood Urea Nitrogen, Serum: 11 mg/dL    Creatinine, Serum: 0.8 mg/dL    Glucose, Serum: 87 mg/dL    Calcium, Total Serum: 9.6 mg/dL    Protein Total, Serum: 6.1 g/dL    Albumin, Serum: 3.4 g/dL    Bilirubin Total, Serum: 0.4 mg/dL    Alkaline Phosphatase, Serum: 93 U/L    Aspartate Aminotransferase (AST/SGOT): 23 U/L    Alanine Aminotransferase (ALT/SGPT): 16 U/L    eGFR if Non : 87  Lipid Profile in AM (08.15.19 @ 07:50)    Total Cholesterol/HDL Ratio Measurement: 3.6 Ratio    Cholesterol, Serum: 134 mg/dL    Triglycerides, Serum: 146 mg/dL    HDL Cholesterol, Serum: 37    Direct LDL: 88  Thyroid Stimulating Hormone, Serum in AM (06.01.19 @ 07:27)    Thyroid Stimulating Hormone, Serum: 1.23 uIU/mL

## 2019-08-28 DIAGNOSIS — I10 ESSENTIAL (PRIMARY) HYPERTENSION: ICD-10-CM

## 2019-08-28 LAB
GLUCOSE BLDC GLUCOMTR-MCNC: 112 MG/DL — HIGH (ref 70–99)
GLUCOSE BLDC GLUCOMTR-MCNC: 144 MG/DL — HIGH (ref 70–99)
GLUCOSE BLDC GLUCOMTR-MCNC: 165 MG/DL — HIGH (ref 70–99)
GLUCOSE BLDC GLUCOMTR-MCNC: 217 MG/DL — HIGH (ref 70–99)

## 2019-08-28 RX ORDER — MAGNESIUM HYDROXIDE 400 MG/1
30 TABLET, CHEWABLE ORAL DAILY
Refills: 0 | Status: DISCONTINUED | OUTPATIENT
Start: 2019-08-28 | End: 2019-09-05

## 2019-08-28 RX ADMIN — Medication 6 UNIT(S): at 17:30

## 2019-08-28 RX ADMIN — Medication 20 MILLIGRAM(S): at 08:23

## 2019-08-28 RX ADMIN — MAGNESIUM HYDROXIDE 30 MILLILITER(S): 400 TABLET, CHEWABLE ORAL at 12:20

## 2019-08-28 RX ADMIN — CLOZAPINE 200 MILLIGRAM(S): 150 TABLET, ORALLY DISINTEGRATING ORAL at 08:21

## 2019-08-28 RX ADMIN — Medication 100 MILLIGRAM(S): at 08:22

## 2019-08-28 RX ADMIN — LOSARTAN POTASSIUM 50 MILLIGRAM(S): 100 TABLET, FILM COATED ORAL at 08:22

## 2019-08-28 RX ADMIN — ATORVASTATIN CALCIUM 40 MILLIGRAM(S): 80 TABLET, FILM COATED ORAL at 20:08

## 2019-08-28 RX ADMIN — Medication 6 UNIT(S): at 11:36

## 2019-08-28 RX ADMIN — Medication 20 MILLIGRAM(S): at 20:08

## 2019-08-28 RX ADMIN — CLOZAPINE 200 MILLIGRAM(S): 150 TABLET, ORALLY DISINTEGRATING ORAL at 20:08

## 2019-08-28 RX ADMIN — Medication 100 MILLIGRAM(S): at 12:20

## 2019-08-28 RX ADMIN — PIOGLITAZONE HYDROCHLORIDE 30 MILLIGRAM(S): 15 TABLET ORAL at 08:22

## 2019-08-28 RX ADMIN — Medication 2: at 11:36

## 2019-08-28 RX ADMIN — INSULIN GLARGINE 25 UNIT(S): 100 INJECTION, SOLUTION SUBCUTANEOUS at 20:07

## 2019-08-28 RX ADMIN — Medication 100 MILLIGRAM(S): at 20:08

## 2019-08-28 RX ADMIN — Medication 20 MILLIGRAM(S): at 12:20

## 2019-08-28 RX ADMIN — Medication 6 UNIT(S): at 08:20

## 2019-08-28 RX ADMIN — SENNA PLUS 2 TABLET(S): 8.6 TABLET ORAL at 20:08

## 2019-08-28 NOTE — PROGRESS NOTE BEHAVIORAL HEALTH - SUMMARY
Ms Robertson is a 50yo F, living at [facility], with a history of Paranoid Schizophrenia who presented to the ED after being referred by her residence for disorganized behavior. Patient appears to have been non compliant with her Clozaril, mirtazepine and her medical medication for the past 3 days. Patient has also been non compliant with outpatient psychiatric follow up which involved blood work to monitor her neutrophil count. It is unclear what the reason for her non compliance is, particularly Clozaril - side effect versus intentionally not wanting to take psychiatric medication. In addition to this, patient is said to be increasingly disorganised in behavior and is isolating herself and is also not compliant with her medical medication. patient's outpatient psychiatrist would feel more comfortable if the titration of her Clozaril is done in an inpatient psychiatric setting.    Upon reassessment, patient denies AH and does not appear to be RTIS or internally preoccupied, however she continues to present withdrawn with blunted affect, concrete thought process, not attending to ADLS. Patient is currently danger to self and unable to care for self. She thus requires further psychiatric stabilization, medication management/ treatement requiring further IPP hospitalization. Will continue to optimize patient's current medication regimen.     #Paranoid Schizophrenia  -continue with clozapine titration, increased to 200 po BID  -Prolixin dec 37.5mg given on 8/16  -last ANC 8/13 5900, next due 9/10/19  -EKG on 8/13 WNL qtc 469 msec which is patient's baseline  - c/w Propranolol 20mg TID for Akithisia  - c/w Ativan 1mg PRN Q12h for anxiety.    #Constipation  -start colace 100mg po TID  -start senna 2 tablets po QHS  - Pt. has positive Bowel sounds     #severe agitation  -for severe agitation no amenable to verbal redirection give prolixin 5 mg po PRN q6, benadryl 25mg po PRN q6 with escalation to IM if patient danger to self/others    #DM  -as per IM recs, c/w lantus insulin 25 IU at bedtime, Pioglitazone 30mg PO daily, Humalog 3U subQ 3x/day before meals.       #HTN  -as per IM recs, c/w losartan 50mg PO Daily      #HLD  -as per IM recs, c/w atorvastatin 40mg po qhs

## 2019-08-28 NOTE — PROGRESS NOTE BEHAVIORAL HEALTH - NSBHFUPINTERVALHXFT_PSY_A_CORE
Chart reviewed, pt seen and examined at bedside. She reported her sleeps is better, she is less anxious, less restless. She reported her mood is better. She reported last she had BM was last Wednesday, Pt. has normal Intestinal sounds, she does not have obstruction on physical exam, She was encouraged to have normal activities and less times in her room . She denies SI/HI/AVH. Otherwise patient has no complaints and denies any adverse medication side effects.

## 2019-08-28 NOTE — CHART NOTE - NSCHARTNOTEFT_GEN_A_CORE
Social Work Note:    Treatment team met with patient to discuss treatment plan, medications and discharge plan.  During the day patient is observed to be isolative to her room.  She is encouraged to be visible on the unit and attend groups that are offered on the unit.  During interview patient was calm, cooperative and appropriate.  ADLs are fair.     No barriers to discharge identified at this time. Plan is for patient to return to [place] [facility] and resume outpatient mental health treatment at [facility].  Patient is aware and agreeable to same.     At this time patient is not psychiatrically stable for discharge.

## 2019-08-28 NOTE — PROGRESS NOTE BEHAVIORAL HEALTH - NS ED B[facility] REVIEW OF ED CHART AVAILABLE LABS REVIEWED
None available
Yes
None available
Yes
None available

## 2019-08-28 NOTE — PROGRESS NOTE BEHAVIORAL HEALTH - NSBHCHARTREVIEWVS_PSY_A_CORE FT
Vital Signs Last 24 Hrs  T(C): 36.2 (28 Aug 2019 09:54), Max: 36.7 (28 Aug 2019 06:14)  T(F): 97.1 (28 Aug 2019 09:54), Max: 98 (28 Aug 2019 06:14)  HR: 86 (28 Aug 2019 09:54) (81 - 104)  BP: 120/68 (28 Aug 2019 09:54) (120/68 - 133/83)  BP(mean): --  RR: 16 (28 Aug 2019 09:54) (16 - 20)  SpO2: --

## 2019-08-29 LAB
GLUCOSE BLDC GLUCOMTR-MCNC: 158 MG/DL — HIGH (ref 70–99)
GLUCOSE BLDC GLUCOMTR-MCNC: 175 MG/DL — HIGH (ref 70–99)
GLUCOSE BLDC GLUCOMTR-MCNC: 80 MG/DL — SIGNIFICANT CHANGE UP (ref 70–99)

## 2019-08-29 RX ORDER — CLOZAPINE 150 MG/1
150 TABLET, ORALLY DISINTEGRATING ORAL DAILY
Refills: 0 | Status: COMPLETED | OUTPATIENT
Start: 2019-08-30 | End: 2019-08-30

## 2019-08-29 RX ORDER — CLOZAPINE 150 MG/1
225 TABLET, ORALLY DISINTEGRATING ORAL AT BEDTIME
Refills: 0 | Status: COMPLETED | OUTPATIENT
Start: 2019-08-29 | End: 2019-08-29

## 2019-08-29 RX ORDER — FLUPHENAZINE HYDROCHLORIDE 1 MG/1
37.5 TABLET, FILM COATED ORAL ONCE
Refills: 0 | Status: DISCONTINUED | OUTPATIENT
Start: 2019-08-30 | End: 2019-08-30

## 2019-08-29 RX ORDER — CLOZAPINE 150 MG/1
300 TABLET, ORALLY DISINTEGRATING ORAL AT BEDTIME
Refills: 0 | Status: COMPLETED | OUTPATIENT
Start: 2019-08-30 | End: 2019-08-30

## 2019-08-29 RX ADMIN — CLOZAPINE 200 MILLIGRAM(S): 150 TABLET, ORALLY DISINTEGRATING ORAL at 08:09

## 2019-08-29 RX ADMIN — Medication 100 MILLIGRAM(S): at 20:22

## 2019-08-29 RX ADMIN — LOSARTAN POTASSIUM 50 MILLIGRAM(S): 100 TABLET, FILM COATED ORAL at 08:09

## 2019-08-29 RX ADMIN — SENNA PLUS 2 TABLET(S): 8.6 TABLET ORAL at 20:22

## 2019-08-29 RX ADMIN — Medication 20 MILLIGRAM(S): at 20:22

## 2019-08-29 RX ADMIN — Medication 20 MILLIGRAM(S): at 12:23

## 2019-08-29 RX ADMIN — Medication 6 UNIT(S): at 08:07

## 2019-08-29 RX ADMIN — Medication 20 MILLIGRAM(S): at 08:09

## 2019-08-29 RX ADMIN — Medication 100 MILLIGRAM(S): at 12:23

## 2019-08-29 RX ADMIN — Medication 1: at 16:55

## 2019-08-29 RX ADMIN — Medication 6 UNIT(S): at 16:55

## 2019-08-29 RX ADMIN — Medication 1: at 12:15

## 2019-08-29 RX ADMIN — ATORVASTATIN CALCIUM 40 MILLIGRAM(S): 80 TABLET, FILM COATED ORAL at 20:22

## 2019-08-29 RX ADMIN — CLOZAPINE 225 MILLIGRAM(S): 150 TABLET, ORALLY DISINTEGRATING ORAL at 20:22

## 2019-08-29 RX ADMIN — PIOGLITAZONE HYDROCHLORIDE 30 MILLIGRAM(S): 15 TABLET ORAL at 08:09

## 2019-08-29 RX ADMIN — Medication 6 UNIT(S): at 12:15

## 2019-08-29 RX ADMIN — Medication 100 MILLIGRAM(S): at 08:09

## 2019-08-29 NOTE — PROGRESS NOTE BEHAVIORAL HEALTH - NSBHFUPINTERVALHXFT_PSY_A_CORE
Chart reviewed, pt seen and examined at bedside. per nursing staff no behavioral issues, patient less disorganized, attending to ADLs. Pt is medication adherent. Upon approach, pt is laying in bed and reports increased morning lethargy. Patient is encouraged to attend groups and leave room more frequently. She admits to  however states that they are not bothersome "they sing to me." Last bowel movement was yesterday. Patient denies SI/HI/VH.

## 2019-08-29 NOTE — PROGRESS NOTE BEHAVIORAL HEALTH - SUMMARY
Ms Robertson is a 48yo F, living at [facility], with a history of Paranoid Schizophrenia who presented to the ED after being referred by her residence for disorganized behavior. Patient appears to have been non compliant with her Clozaril, mirtazepine and her medical medication for the past 3 days. Patient has also been non compliant with outpatient psychiatric follow up which involved blood work to monitor her neutrophil count. It is unclear what the reason for her non compliance is, particularly Clozaril - side effect versus intentionally not wanting to take psychiatric medication. In addition to this, patient is said to be increasingly disorganised in behavior and is isolating herself and is also not compliant with her medical medication. patient's outpatient psychiatrist would feel more comfortable if the titration of her Clozaril is done in an inpatient psychiatric setting.    Upon reassessment, patient reports decreased AH, does not appear to be RTIS or internally preoccupied, however she continues to present withdrawn with blunted affect, concrete thought process. Patient is currently danger to self and unable to care for self. She thus requires further psychiatric stabilization, medication management/ treatement requiring further IPP hospitalization. Will continue to optimize patient's current medication regimen.     #Paranoid Schizophrenia  -continue with clozapine titration, increased to 225mg po bid, with plan to switch to 150mg/300mg tomorrow  -Prolixin dec 37.5mg given on 8/16, next dose 37.5 mg due gaurav 8/30  -last ANC 8/13 5900, next due 9/10/19  -EKG on 8/13 WNL qtc 469 msec which is patient's baseline  - c/w Propranolol 20mg TID for Akithisia  - c/w Ativan 1mg PRN Q12h for anxiety.    #Constipation  -c/w colace 100mg po TID  -c/w senna 2 tablets po QHS  -c/w magensium hydroxide 30mL po PRN  -last BM yesterday       #severe agitation  -for severe agitation no amenable to verbal redirection give prolixin 5 mg po PRN q6, benadryl 25mg po PRN q6 with escalation to IM if patient danger to self/others    #DM  -as per IM recs, c/w lantus insulin 25 IU at bedtime, Pioglitazone 30mg PO daily, Humalog 3U subQ 3x/day before meals.       #HTN  -as per IM recs, c/w losartan 50mg PO Daily      #HLD  -as per IM recs, c/w atorvastatin 40mg po qhs Ms Robertson is a 50yo F, living at [facility], with a history of Paranoid Schizophrenia who presented to the ED after being referred by her residence for disorganized behavior. Patient appears to have been non compliant with her Clozaril, mirtazepine and her medical medication for the past 3 days. Patient has also been non compliant with outpatient psychiatric follow up which involved blood work to monitor her neutrophil count. It is unclear what the reason for her non compliance is, particularly Clozaril - side effect versus intentionally not wanting to take psychiatric medication. In addition to this, patient is said to be increasingly disorganised in behavior and is isolating herself and is also not compliant with her medical medication. patient's outpatient psychiatrist would feel more comfortable if the titration of her Clozaril is done in an inpatient psychiatric setting.    Upon reassessment, patient reports decreased AH, does not appear to be RTIS or internally preoccupied, however she continues to present withdrawn with blunted affect, concrete thought process. Patient is currently danger to self and unable to care for self. She thus requires further psychiatric stabilization, medication management/ treatement requiring further IPP hospitalization. Will continue to optimize patient's current medication regimen.     #Paranoid Schizophrenia  -continue with clozapine titration, increased to 200mg/225mg, with plan to increase to 150mg/300mg tomorrow, decreasing morning dose due to lethargy  -Prolixin dec 37.5mg given on 8/16, next dose 37.5 mg due gaurav 8/30  -last ANC 8/13 5900, next due 9/10/19  -EKG on 8/13 WNL qtc 469 msec which is patient's baseline  - c/w Propranolol 20mg TID for Akithisia  - c/w Ativan 1mg PRN Q12h for anxiety.    #Constipation  -c/w colace 100mg po TID  -c/w senna 2 tablets po QHS  -c/w magensium hydroxide 30mL po PRN  -last BM yesterday       #severe agitation  -for severe agitation no amenable to verbal redirection give prolixin 5 mg po PRN q6, benadryl 25mg po PRN q6 with escalation to IM if patient danger to self/others    #DM  -as per IM recs, c/w lantus insulin 25 IU at bedtime, Pioglitazone 30mg PO daily, Humalog 3U subQ 3x/day before meals.       #HTN  -as per IM recs, c/w losartan 50mg PO Daily      #HLD  -as per IM recs, c/w atorvastatin 40mg po qhs

## 2019-08-29 NOTE — PROGRESS NOTE BEHAVIORAL HEALTH - NSBHCHARTREVIEWVS_PSY_A_CORE FT
ICU Vital Signs Last 24 Hrs  T(C): 36.6 (29 Aug 2019 08:52), Max: 37.1 (29 Aug 2019 06:01)  T(F): 97.9 (29 Aug 2019 08:52), Max: 98.7 (29 Aug 2019 06:01)  HR: 101 (29 Aug 2019 08:52) (79 - 101)  BP: 121/74 (29 Aug 2019 08:52) (111/77 - 121/74)  BP(mean): --  ABP: --  ABP(mean): --  RR: 18 (29 Aug 2019 08:52) (16 - 18)  SpO2: --

## 2019-08-30 LAB
GLUCOSE BLDC GLUCOMTR-MCNC: 206 MG/DL — HIGH (ref 70–99)
GLUCOSE BLDC GLUCOMTR-MCNC: 248 MG/DL — HIGH (ref 70–99)
GLUCOSE BLDC GLUCOMTR-MCNC: 315 MG/DL — HIGH (ref 70–99)

## 2019-08-30 RX ORDER — CLOZAPINE 150 MG/1
150 TABLET, ORALLY DISINTEGRATING ORAL DAILY
Refills: 0 | Status: DISCONTINUED | OUTPATIENT
Start: 2019-08-31 | End: 2019-09-05

## 2019-08-30 RX ORDER — CLOZAPINE 150 MG/1
325 TABLET, ORALLY DISINTEGRATING ORAL AT BEDTIME
Refills: 0 | Status: DISCONTINUED | OUTPATIENT
Start: 2019-08-31 | End: 2019-09-03

## 2019-08-30 RX ORDER — FLUPHENAZINE HYDROCHLORIDE 1 MG/1
37.5 TABLET, FILM COATED ORAL ONCE
Refills: 0 | Status: COMPLETED | OUTPATIENT
Start: 2019-08-30 | End: 2019-08-30

## 2019-08-30 RX ADMIN — Medication 2: at 11:33

## 2019-08-30 RX ADMIN — LOSARTAN POTASSIUM 50 MILLIGRAM(S): 100 TABLET, FILM COATED ORAL at 08:01

## 2019-08-30 RX ADMIN — Medication 100 MILLIGRAM(S): at 20:29

## 2019-08-30 RX ADMIN — INSULIN GLARGINE 25 UNIT(S): 100 INJECTION, SOLUTION SUBCUTANEOUS at 20:28

## 2019-08-30 RX ADMIN — SENNA PLUS 2 TABLET(S): 8.6 TABLET ORAL at 20:29

## 2019-08-30 RX ADMIN — Medication 20 MILLIGRAM(S): at 20:29

## 2019-08-30 RX ADMIN — PIOGLITAZONE HYDROCHLORIDE 30 MILLIGRAM(S): 15 TABLET ORAL at 08:04

## 2019-08-30 RX ADMIN — FLUPHENAZINE HYDROCHLORIDE 37.5 MILLIGRAM(S): 1 TABLET, FILM COATED ORAL at 12:21

## 2019-08-30 RX ADMIN — ATORVASTATIN CALCIUM 40 MILLIGRAM(S): 80 TABLET, FILM COATED ORAL at 20:29

## 2019-08-30 RX ADMIN — CLOZAPINE 300 MILLIGRAM(S): 150 TABLET, ORALLY DISINTEGRATING ORAL at 20:29

## 2019-08-30 RX ADMIN — Medication 6 UNIT(S): at 07:49

## 2019-08-30 RX ADMIN — CLOZAPINE 150 MILLIGRAM(S): 150 TABLET, ORALLY DISINTEGRATING ORAL at 08:01

## 2019-08-30 RX ADMIN — Medication 6 UNIT(S): at 11:33

## 2019-08-30 RX ADMIN — Medication 2: at 07:50

## 2019-08-30 RX ADMIN — Medication 100 MILLIGRAM(S): at 12:21

## 2019-08-30 RX ADMIN — Medication 20 MILLIGRAM(S): at 08:01

## 2019-08-30 RX ADMIN — Medication 100 MILLIGRAM(S): at 08:01

## 2019-08-30 NOTE — PROGRESS NOTE BEHAVIORAL HEALTH - NSBHFUPINTERVALHXFT_PSY_A_CORE
Chart reviewed, pt seen and examined during team meeting. Per nursing staff no behavioral issues, patient is medication compliant. Patient states shes ready to go back to [facility]. Pt continues to be in hospital gown, encouraged patient to change into outside clothes and continue to attend to ADLs. Patient stated she attended group activities yesterday. Pt was previously on Clozaril 100mg po qam, 400 mg po qhs. Pt endorses AH however states that they "say positive things." She denies SI/HI/AVH.

## 2019-08-30 NOTE — PROGRESS NOTE BEHAVIORAL HEALTH - NSBHCHARTREVIEWVS_PSY_A_CORE FT
ICU Vital Signs Last 24 Hrs  T(C): 36.2 (30 Aug 2019 09:01), Max: 37.2 (30 Aug 2019 06:14)  T(F): 97.1 (30 Aug 2019 09:01), Max: 98.9 (30 Aug 2019 06:14)  HR: 104 (30 Aug 2019 09:01) (80 - 104)  BP: 129/76 (30 Aug 2019 09:01) (100/52 - 131/88)  BP(mean): --  ABP: --  ABP(mean): --  RR: 18 (30 Aug 2019 09:01) (16 - 18)  SpO2: --

## 2019-08-30 NOTE — PROGRESS NOTE BEHAVIORAL HEALTH - SUMMARY
Ms Robertson is a 50yo F, living at [facility], with a history of Paranoid Schizophrenia who presented to the ED after being referred by her residence for disorganized behavior. Patient appears to have been non compliant with her Clozaril, mirtazepine and her medical medication for the past 3 days. Patient has also been non compliant with outpatient psychiatric follow up which involved blood work to monitor her neutrophil count. It is unclear what the reason for her non compliance is, particularly Clozaril - side effect versus intentionally not wanting to take psychiatric medication. In addition to this, patient is said to be increasingly disorganised in behavior and is isolating herself and is also not compliant with her medical medication. patient's outpatient psychiatrist would feel more comfortable if the titration of her Clozaril is done in an inpatient psychiatric setting.    Upon reassessment, patient reports decreased AH, does not appear to be RTIS or internally preoccupied, however she continues to present withdrawn with blunted affect, concrete thought process. Patient is currently danger to self and unable to care for self. She thus requires further psychiatric stabilization, medication management/ treatement requiring further IPP hospitalization. Will continue to optimize patient's current medication regimen.     #Paranoid Schizophrenia  -continue with clozapine titration, increased to 200mg/225mg, with plan to increase to 150mg/300mg tomorrow, decreasing morning dose due to lethargy  -Prolixin dec 37.5mg given on 8/16, next dose 37.5 mg due gaurav 8/30  -last ANC 8/13 5900, next due 9/10/19  -EKG on 8/13 WNL qtc 469 msec which is patient's baseline  - c/w Propranolol 20mg TID for Akithisia  - c/w Ativan 1mg PRN Q12h for anxiety.    #Constipation  -c/w colace 100mg po TID  -c/w senna 2 tablets po QHS  -c/w magensium hydroxide 30mL po PRN  -last BM yesterday       #severe agitation  -for severe agitation no amenable to verbal redirection give prolixin 5 mg po PRN q6, benadryl 25mg po PRN q6 with escalation to IM if patient danger to self/others    #DM  -as per IM recs, c/w lantus insulin 25 IU at bedtime, Pioglitazone 30mg PO daily, Humalog 3U subQ 3x/day before meals.       #HTN  -as per IM recs, c/w losartan 50mg PO Daily      #HLD  -as per IM recs, c/w atorvastatin 40mg po qhs Ms Robertson is a 48yo F, living at [facility], with a history of Paranoid Schizophrenia who presented to the ED after being referred by her residence for disorganized behavior. Patient appears to have been non compliant with her Clozaril, mirtazepine and her medical medication for the past 3 days. Patient has also been non compliant with outpatient psychiatric follow up which involved blood work to monitor her neutrophil count. It is unclear what the reason for her non compliance is, particularly Clozaril - side effect versus intentionally not wanting to take psychiatric medication. In addition to this, patient is said to be increasingly disorganised in behavior and is isolating herself and is also not compliant with her medical medication. patient's outpatient psychiatrist would feel more comfortable if the titration of her Clozaril is done in an inpatient psychiatric setting.    Upon reassessment, patient reports decreased AH, does not appear to be RTIS or internally preoccupied, however she continues to present with blunted affect, concrete thought process. Patient is currently danger to self and unable to care for self. She thus requires further psychiatric stabilization, medication management/ treatement requiring further IPP hospitalization. Will continue to optimize patient's current medication regimen.     #Paranoid Schizophrenia  -continue with clozapine titration, 150/300mg today, with increase to 150/325mg tomorrow   -Prolixin dec 37.5mg given today 8/30  -last ANC 8/13 5900, next due 9/10/19  -EKG on 8/29 WNL qtc   - c/w Propranolol 20mg TID for Akithisia  - c/w Ativan 1mg PRN Q12h for anxiety.    #Constipation  -c/w colace 100mg po TID  -c/w senna 2 tablets po QHS  -c/w magensium hydroxide 30mL po PRN  -last BM yesterday       #severe agitation  -for severe agitation no amenable to verbal redirection give Prolixin 5 mg po PRN q6, benadryl 25mg po PRN q6 with escalation to IM if patient danger to self/others    #DM  -as per IM recs, c/w lantus insulin 25 IU at bedtime, Pioglitazone 30mg PO daily, Humalog 3U subQ 3x/day before meals.       #HTN  -as per IM recs, c/w losartan 50mg PO Daily      #HLD  -as per IM recs, c/w atorvastatin 40mg po qhs Ms Robertson is a 50yo F, living at [facility], with a history of Paranoid Schizophrenia who presented to the ED after being referred by her residence for disorganized behavior. Patient appears to have been non compliant with her Clozaril, mirtazepine and her medical medication for the past 3 days. Patient has also been non compliant with outpatient psychiatric follow up which involved blood work to monitor her neutrophil count. It is unclear what the reason for her non compliance is, particularly Clozaril - side effect versus intentionally not wanting to take psychiatric medication. In addition to this, patient is said to be increasingly disorganised in behavior and is isolating herself and is also not compliant with her medical medication. patient's outpatient psychiatrist would feel more comfortable if the titration of her Clozaril is done in an inpatient psychiatric setting.    Upon reassessment, patient reports decreased AH, does not appear to be RTIS or internally preoccupied, however she continues to present with blunted affect, concrete thought process. Patient is currently danger to self and unable to care for self. She thus requires further psychiatric stabilization, medication management/ treatement requiring further IPP hospitalization. Will continue to optimize patient's current medication regimen.     #Paranoid Schizophrenia  -continue with clozapine titration, 150/300mg today, with increase to 150/325mg tomorrow   -Prolixin dec 37.5mg given today 8/30  -last ANC 8/13 5900, next due 9/10/19  -EKG on 8/29 WNL qtc 451ms  - c/w Propranolol 20mg TID for Akithisia  - c/w Ativan 1mg PRN Q12h for anxiety.    #Constipation  -c/w colace 100mg po TID  -c/w senna 2 tablets po QHS  -c/w magensium hydroxide 30mL po PRN  -last BM yesterday       #severe agitation  -for severe agitation no amenable to verbal redirection give Prolixin 5 mg po PRN q6, benadryl 25mg po PRN q6 with escalation to IM if patient danger to self/others    #DM  -as per IM recs, c/w lantus insulin 25 IU at bedtime, Pioglitazone 30mg PO daily, Humalog 3U subQ 3x/day before meals.       #HTN  -as per IM recs, c/w losartan 50mg PO Daily      #HLD  -as per IM recs, c/w atorvastatin 40mg po qhs

## 2019-08-30 NOTE — CHART NOTE - NSCHARTNOTEFT_GEN_A_CORE
Social Work Note:    Treatment team meets daily with patient to discuss treatment plan, medications and discharge plan.  During the day patient continues to be isolative to her room.  She is encouraged to be visible on the unit and attend groups that are offered.  ADLs are fair.   On interview patient is cooperative.     No barriers to discharge identified at this time. Plan is for patient to return to [facility] and resume outpatient mental health treatment at [facility].  Patient is aware and agreeable to same.     At this time patient is not psychiatrically stable for discharge.

## 2019-08-30 NOTE — PROGRESS NOTE BEHAVIORAL HEALTH - RISK ASSESSMENT
Pt. has chronic risk , however currently she is adherent to meds, no sub abuse, she attends groups. she denied any SI or plans. Pt has chronically elevated risk for self harm. However currently she is medication adherent, no substance abuse, attends groups, and denies Suicidal ideation, plan, or intent.

## 2019-08-31 LAB
GLUCOSE BLDC GLUCOMTR-MCNC: 149 MG/DL — HIGH (ref 70–99)
GLUCOSE BLDC GLUCOMTR-MCNC: 169 MG/DL — HIGH (ref 70–99)
GLUCOSE BLDC GLUCOMTR-MCNC: 179 MG/DL — HIGH (ref 70–99)
GLUCOSE BLDC GLUCOMTR-MCNC: 300 MG/DL — HIGH (ref 70–99)

## 2019-08-31 RX ADMIN — Medication 100 MILLIGRAM(S): at 08:02

## 2019-08-31 RX ADMIN — Medication 20 MILLIGRAM(S): at 08:02

## 2019-08-31 RX ADMIN — Medication 6 UNIT(S): at 11:37

## 2019-08-31 RX ADMIN — CLOZAPINE 325 MILLIGRAM(S): 150 TABLET, ORALLY DISINTEGRATING ORAL at 20:12

## 2019-08-31 RX ADMIN — PIOGLITAZONE HYDROCHLORIDE 30 MILLIGRAM(S): 15 TABLET ORAL at 08:05

## 2019-08-31 RX ADMIN — LOSARTAN POTASSIUM 50 MILLIGRAM(S): 100 TABLET, FILM COATED ORAL at 08:02

## 2019-08-31 RX ADMIN — Medication 100 MILLIGRAM(S): at 12:03

## 2019-08-31 RX ADMIN — SENNA PLUS 2 TABLET(S): 8.6 TABLET ORAL at 20:12

## 2019-08-31 RX ADMIN — ATORVASTATIN CALCIUM 40 MILLIGRAM(S): 80 TABLET, FILM COATED ORAL at 20:12

## 2019-08-31 RX ADMIN — INSULIN GLARGINE 25 UNIT(S): 100 INJECTION, SOLUTION SUBCUTANEOUS at 20:12

## 2019-08-31 RX ADMIN — Medication 1: at 11:37

## 2019-08-31 RX ADMIN — Medication 100 MILLIGRAM(S): at 20:12

## 2019-08-31 RX ADMIN — CLOZAPINE 150 MILLIGRAM(S): 150 TABLET, ORALLY DISINTEGRATING ORAL at 08:02

## 2019-09-01 LAB
GLUCOSE BLDC GLUCOMTR-MCNC: 180 MG/DL — HIGH (ref 70–99)
GLUCOSE BLDC GLUCOMTR-MCNC: 240 MG/DL — HIGH (ref 70–99)
GLUCOSE BLDC GLUCOMTR-MCNC: 241 MG/DL — HIGH (ref 70–99)
GLUCOSE BLDC GLUCOMTR-MCNC: 262 MG/DL — HIGH (ref 70–99)

## 2019-09-01 RX ADMIN — Medication 100 MILLIGRAM(S): at 09:14

## 2019-09-01 RX ADMIN — Medication 6 UNIT(S): at 11:29

## 2019-09-01 RX ADMIN — PIOGLITAZONE HYDROCHLORIDE 30 MILLIGRAM(S): 15 TABLET ORAL at 09:14

## 2019-09-01 RX ADMIN — LOSARTAN POTASSIUM 50 MILLIGRAM(S): 100 TABLET, FILM COATED ORAL at 09:14

## 2019-09-01 RX ADMIN — Medication 20 MILLIGRAM(S): at 20:16

## 2019-09-01 RX ADMIN — CLOZAPINE 325 MILLIGRAM(S): 150 TABLET, ORALLY DISINTEGRATING ORAL at 20:15

## 2019-09-01 RX ADMIN — ATORVASTATIN CALCIUM 40 MILLIGRAM(S): 80 TABLET, FILM COATED ORAL at 20:15

## 2019-09-01 RX ADMIN — Medication 2: at 16:43

## 2019-09-01 RX ADMIN — Medication 100 MILLIGRAM(S): at 12:36

## 2019-09-01 RX ADMIN — Medication 6 UNIT(S): at 07:40

## 2019-09-01 RX ADMIN — SENNA PLUS 2 TABLET(S): 8.6 TABLET ORAL at 20:16

## 2019-09-01 RX ADMIN — Medication 6 UNIT(S): at 16:43

## 2019-09-01 RX ADMIN — Medication 1: at 07:41

## 2019-09-01 RX ADMIN — CLOZAPINE 150 MILLIGRAM(S): 150 TABLET, ORALLY DISINTEGRATING ORAL at 09:14

## 2019-09-01 RX ADMIN — INSULIN GLARGINE 25 UNIT(S): 100 INJECTION, SOLUTION SUBCUTANEOUS at 20:16

## 2019-09-01 RX ADMIN — Medication 100 MILLIGRAM(S): at 20:15

## 2019-09-01 RX ADMIN — Medication 2: at 11:29

## 2019-09-02 LAB
GLUCOSE BLDC GLUCOMTR-MCNC: 124 MG/DL — HIGH (ref 70–99)
GLUCOSE BLDC GLUCOMTR-MCNC: 151 MG/DL — HIGH (ref 70–99)
GLUCOSE BLDC GLUCOMTR-MCNC: 186 MG/DL — HIGH (ref 70–99)
GLUCOSE BLDC GLUCOMTR-MCNC: 269 MG/DL — HIGH (ref 70–99)

## 2019-09-02 RX ADMIN — Medication 1: at 11:59

## 2019-09-02 RX ADMIN — CLOZAPINE 150 MILLIGRAM(S): 150 TABLET, ORALLY DISINTEGRATING ORAL at 09:30

## 2019-09-02 RX ADMIN — Medication 100 MILLIGRAM(S): at 14:22

## 2019-09-02 RX ADMIN — ATORVASTATIN CALCIUM 40 MILLIGRAM(S): 80 TABLET, FILM COATED ORAL at 20:08

## 2019-09-02 RX ADMIN — LOSARTAN POTASSIUM 50 MILLIGRAM(S): 100 TABLET, FILM COATED ORAL at 09:31

## 2019-09-02 RX ADMIN — Medication 1: at 16:45

## 2019-09-02 RX ADMIN — INSULIN GLARGINE 25 UNIT(S): 100 INJECTION, SOLUTION SUBCUTANEOUS at 20:09

## 2019-09-02 RX ADMIN — Medication 100 MILLIGRAM(S): at 20:09

## 2019-09-02 RX ADMIN — PIOGLITAZONE HYDROCHLORIDE 30 MILLIGRAM(S): 15 TABLET ORAL at 09:31

## 2019-09-02 RX ADMIN — Medication 20 MILLIGRAM(S): at 14:22

## 2019-09-02 RX ADMIN — Medication 6 UNIT(S): at 11:58

## 2019-09-02 RX ADMIN — Medication 100 MILLIGRAM(S): at 09:31

## 2019-09-02 RX ADMIN — SENNA PLUS 2 TABLET(S): 8.6 TABLET ORAL at 20:09

## 2019-09-02 RX ADMIN — Medication 6 UNIT(S): at 16:44

## 2019-09-02 RX ADMIN — CLOZAPINE 325 MILLIGRAM(S): 150 TABLET, ORALLY DISINTEGRATING ORAL at 20:09

## 2019-09-02 RX ADMIN — Medication 20 MILLIGRAM(S): at 20:09

## 2019-09-02 RX ADMIN — Medication 20 MILLIGRAM(S): at 09:31

## 2019-09-03 LAB
GLUCOSE BLDC GLUCOMTR-MCNC: 141 MG/DL — HIGH (ref 70–99)
GLUCOSE BLDC GLUCOMTR-MCNC: 152 MG/DL — HIGH (ref 70–99)
GLUCOSE BLDC GLUCOMTR-MCNC: 158 MG/DL — HIGH (ref 70–99)
GLUCOSE BLDC GLUCOMTR-MCNC: 181 MG/DL — HIGH (ref 70–99)

## 2019-09-03 PROCEDURE — 99231 SBSQ HOSP IP/OBS SF/LOW 25: CPT

## 2019-09-03 RX ORDER — CLOZAPINE 150 MG/1
350 TABLET, ORALLY DISINTEGRATING ORAL AT BEDTIME
Refills: 0 | Status: DISCONTINUED | OUTPATIENT
Start: 2019-09-03 | End: 2019-09-05

## 2019-09-03 RX ADMIN — PIOGLITAZONE HYDROCHLORIDE 30 MILLIGRAM(S): 15 TABLET ORAL at 08:31

## 2019-09-03 RX ADMIN — CLOZAPINE 150 MILLIGRAM(S): 150 TABLET, ORALLY DISINTEGRATING ORAL at 08:30

## 2019-09-03 RX ADMIN — CLOZAPINE 350 MILLIGRAM(S): 150 TABLET, ORALLY DISINTEGRATING ORAL at 20:05

## 2019-09-03 RX ADMIN — Medication 6 UNIT(S): at 07:43

## 2019-09-03 RX ADMIN — Medication 20 MILLIGRAM(S): at 08:30

## 2019-09-03 RX ADMIN — Medication 1: at 07:43

## 2019-09-03 RX ADMIN — LOSARTAN POTASSIUM 50 MILLIGRAM(S): 100 TABLET, FILM COATED ORAL at 08:30

## 2019-09-03 RX ADMIN — SENNA PLUS 2 TABLET(S): 8.6 TABLET ORAL at 20:05

## 2019-09-03 RX ADMIN — Medication 1: at 16:28

## 2019-09-03 RX ADMIN — Medication 6 UNIT(S): at 11:37

## 2019-09-03 RX ADMIN — Medication 20 MILLIGRAM(S): at 13:14

## 2019-09-03 RX ADMIN — Medication 20 MILLIGRAM(S): at 20:05

## 2019-09-03 RX ADMIN — Medication 100 MILLIGRAM(S): at 13:14

## 2019-09-03 RX ADMIN — Medication 100 MILLIGRAM(S): at 08:30

## 2019-09-03 RX ADMIN — Medication 6 UNIT(S): at 16:28

## 2019-09-03 RX ADMIN — INSULIN GLARGINE 25 UNIT(S): 100 INJECTION, SOLUTION SUBCUTANEOUS at 20:18

## 2019-09-03 RX ADMIN — Medication 100 MILLIGRAM(S): at 20:05

## 2019-09-03 RX ADMIN — ATORVASTATIN CALCIUM 40 MILLIGRAM(S): 80 TABLET, FILM COATED ORAL at 20:04

## 2019-09-03 NOTE — PROGRESS NOTE BEHAVIORAL HEALTH - NSBHCHARTREVIEWVS_PSY_A_CORE FT
ICU Vital Signs Last 24 Hrs  T(C): 36.2 (30 Aug 2019 09:01), Max: 37.2 (30 Aug 2019 06:14)  T(F): 97.1 (30 Aug 2019 09:01), Max: 98.9 (30 Aug 2019 06:14)  HR: 104 (30 Aug 2019 09:01) (80 - 104)  BP: 129/76 (30 Aug 2019 09:01) (100/52 - 131/88)  BP(mean): --  ABP: --  ABP(mean): --  RR: 18 (30 Aug 2019 09:01) (16 - 18)  SpO2: -- ICU Vital Signs Last 24 Hrs  T(C): 35.7 (03 Sep 2019 09:04), Max: 36.8 (03 Sep 2019 05:54)  T(F): 96.2 (03 Sep 2019 09:04), Max: 98.3 (03 Sep 2019 05:54)  HR: 96 (03 Sep 2019 09:04) (85 - 96)  BP: 98/57 (03 Sep 2019 09:04) (98/57 - 115/64)  BP(mean): --  ABP: --  ABP(mean): --  RR: 18 (03 Sep 2019 09:04) (16 - 18)  SpO2: --

## 2019-09-03 NOTE — PROGRESS NOTE BEHAVIORAL HEALTH - RISK ASSESSMENT
Pt has chronically elevated risk for self harm. However currently she is medication adherent, no substance abuse, attends groups, and denies Suicidal ideation, plan, or intent.

## 2019-09-03 NOTE — PROGRESS NOTE BEHAVIORAL HEALTH - SUMMARY
Ms Robertson is a 48yo F, living at [facility], with a history of Paranoid Schizophrenia who presented to the ED after being referred by her residence for disorganized behavior. Patient appears to have been non compliant with her Clozaril, mirtazepine and her medical medication for the past 3 days. Patient has also been non compliant with outpatient psychiatric follow up which involved blood work to monitor her neutrophil count. It is unclear what the reason for her non compliance is, particularly Clozaril - side effect versus intentionally not wanting to take psychiatric medication. In addition to this, patient is said to be increasingly disorganised in behavior and is isolating herself and is also not compliant with her medical medication. patient's outpatient psychiatrist would feel more comfortable if the titration of her Clozaril is done in an inpatient psychiatric setting.    Upon reassessment, patient reports decreased AH, does not appear to be RTIS or internally preoccupied, however she continues to present with blunted affect, concrete thought process. Patient is currently danger to self and unable to care for self. She thus requires further psychiatric stabilization, medication management/ treatement requiring further IPP hospitalization. Will continue to optimize patient's current medication regimen.     #Paranoid Schizophrenia  -continue with clozapine titration, 150/300mg today, with increase to 150/325mg tomorrow   -Prolixin dec 37.5mg given today 8/30  -last ANC 8/13 5900, next due 9/10/19  -EKG on 8/29 WNL qtc 451ms  - c/w Propranolol 20mg TID for Akithisia  - c/w Ativan 1mg PRN Q12h for anxiety.    #Constipation  -c/w colace 100mg po TID  -c/w senna 2 tablets po QHS  -c/w magensium hydroxide 30mL po PRN  -last BM yesterday       #severe agitation  -for severe agitation no amenable to verbal redirection give Prolixin 5 mg po PRN q6, benadryl 25mg po PRN q6 with escalation to IM if patient danger to self/others    #DM  -as per IM recs, c/w lantus insulin 25 IU at bedtime, Pioglitazone 30mg PO daily, Humalog 3U subQ 3x/day before meals.       #HTN  -as per IM recs, c/w losartan 50mg PO Daily      #HLD  -as per IM recs, c/w atorvastatin 40mg po qhs Ms Robertson is a 48yo F, living at [facility], with a history of Paranoid Schizophrenia who presented to the ED after being referred by her residence for disorganized behavior. Patient appears to have been non compliant with her Clozaril, mirtazepine and her medical medication for the past 3 days. Patient has also been non compliant with outpatient psychiatric follow up which involved blood work to monitor her neutrophil count. It is unclear what the reason for her non compliance is, particularly Clozaril - side effect versus intentionally not wanting to take psychiatric medication. In addition to this, patient is said to be increasingly disorganised in behavior and is isolating herself and is also not compliant with her medical medication. Patient's outpatient psychiatrist would feel more comfortable if the titration of her Clozaril is done in an inpatient psychiatric setting.    Upon reassessment, patient reports decreased AH, does not appear to be RTIS or internally preoccupied, however she continues to present with constricted affect, concrete thought process. Patient has been medication compliant and is agreeable with discharge back to [facility]. Patient is currently danger to self and unable to care for self. She thus requires further psychiatric stabilization, medication management/ treatement requiring further IPP hospitalization. Will continue to optimize patient's current medication regimen.     #Paranoid Schizophrenia  -continue with clozapine titration, increased to 150 mg po QAM and 350mg po QHS  -Prolixin dec 37.5mg given 8/30, next dose due 9/13  -last ANC 8/13 5900, next due 9/10/19  -EKG on 8/30 WNL qtc 462ms  - c/w Propranolol 20mg TID for Akithisia  - c/w Ativan 1mg PRN Q12h for anxiety.    #Constipation  -c/w colace 100mg po TID  -c/w senna 2 tablets po QHS  -c/w magensium hydroxide 30mL po PRN  -last BM yesterday       #severe agitation  -for severe agitation no amenable to verbal redirection give Prolixin 5 mg po PRN q6, benadryl 25mg po PRN q6 with escalation to IM if patient danger to self/others    #DM  -as per IM recs, c/w lantus insulin 25 IU at bedtime, Pioglitazone 30mg PO daily, Humalog 3U subQ 3x/day before meals.       #HTN  -as per IM recs, c/w losartan 50mg PO Daily      #HLD  -as per IM recs, c/w atorvastatin 40mg po qhs

## 2019-09-03 NOTE — PROGRESS NOTE BEHAVIORAL HEALTH - NSBHFUPINTERVALHXFT_PSY_A_CORE
Chart reviewed, pt seen and examined at bedside. Per nursing staff patient is quiet, isolative, no behavioral issues over the weekend, patient is medication compliant, not requiring PRN. Pt seen this AM in her room, states her last BM was yesterday. Patient is informed that she has a meeting with [facility] today and is encourage to shower and change into outside clothes. Patient is agreeable to plan. She reports AH, however states they are not bothersome stating that they say "good things about my family." Patient is encourage to maintain medication compliance after hospital discharge. Chart reviewed, pt seen and examined at bedside. Per nursing staff patient is quiet, isolative, no behavioral issues over the weekend, patient is medication compliant, not requiring PRN. Pt seen this AM in her room, states her last BM was yesterday. Patient is informed that she has a meeting with [facility] today and is encourage to shower and change into outside clothes. Patient is agreeable to plan. She reports AH, however states they are not bothersome stating that they say "good things about my family." Patient is encourage to maintain medication compliance after hospital discharge. She denies SI/HI/VH.

## 2019-09-04 LAB
GLUCOSE BLDC GLUCOMTR-MCNC: 149 MG/DL — HIGH (ref 70–99)
GLUCOSE BLDC GLUCOMTR-MCNC: 262 MG/DL — HIGH (ref 70–99)
GLUCOSE BLDC GLUCOMTR-MCNC: 97 MG/DL — SIGNIFICANT CHANGE UP (ref 70–99)

## 2019-09-04 PROCEDURE — 99231 SBSQ HOSP IP/OBS SF/LOW 25: CPT

## 2019-09-04 RX ADMIN — PIOGLITAZONE HYDROCHLORIDE 30 MILLIGRAM(S): 15 TABLET ORAL at 08:35

## 2019-09-04 RX ADMIN — Medication 6 UNIT(S): at 17:11

## 2019-09-04 RX ADMIN — SENNA PLUS 2 TABLET(S): 8.6 TABLET ORAL at 20:01

## 2019-09-04 RX ADMIN — Medication 3: at 12:03

## 2019-09-04 RX ADMIN — Medication 6 UNIT(S): at 08:38

## 2019-09-04 RX ADMIN — LOSARTAN POTASSIUM 50 MILLIGRAM(S): 100 TABLET, FILM COATED ORAL at 08:34

## 2019-09-04 RX ADMIN — Medication 20 MILLIGRAM(S): at 08:35

## 2019-09-04 RX ADMIN — Medication 100 MILLIGRAM(S): at 13:07

## 2019-09-04 RX ADMIN — Medication 100 MILLIGRAM(S): at 20:01

## 2019-09-04 RX ADMIN — Medication 100 MILLIGRAM(S): at 08:34

## 2019-09-04 RX ADMIN — Medication 20 MILLIGRAM(S): at 13:07

## 2019-09-04 RX ADMIN — Medication 6 UNIT(S): at 12:03

## 2019-09-04 RX ADMIN — CLOZAPINE 150 MILLIGRAM(S): 150 TABLET, ORALLY DISINTEGRATING ORAL at 08:34

## 2019-09-04 RX ADMIN — ATORVASTATIN CALCIUM 40 MILLIGRAM(S): 80 TABLET, FILM COATED ORAL at 20:01

## 2019-09-04 RX ADMIN — Medication 20 MILLIGRAM(S): at 20:01

## 2019-09-04 RX ADMIN — CLOZAPINE 350 MILLIGRAM(S): 150 TABLET, ORALLY DISINTEGRATING ORAL at 20:01

## 2019-09-04 NOTE — PROGRESS NOTE BEHAVIORAL HEALTH - MUSCLE TONE / STRENGTH
Normal muscle tone/strength

## 2019-09-04 NOTE — PROGRESS NOTE BEHAVIORAL HEALTH - THOUGHT CONTENT
Unremarkable

## 2019-09-04 NOTE — DISCHARGE NOTE BEHAVIORAL HEALTH - NSBHDCMEDSFT_PSY_A_CORE
Patient was restarted on outpatient medication Clozaril at 25 mg po and over admission the dose was titrated up to 500 mg PO qd. Patient also given her Prolixin dec 37.5 mg every two weeks since being in the hospital, last dose given on 8/30. Patient had strong response to medications.

## 2019-09-04 NOTE — PROGRESS NOTE BEHAVIORAL HEALTH - NSBHFUPTYPE_PSY_A_CORE
Inpatient
Inpatient-On Service Note
Inpatient

## 2019-09-04 NOTE — DISCHARGE NOTE BEHAVIORAL HEALTH - PAST PSYCHIATRIC HISTORY
1st psych admission in early 20s, long hx of schizophrenia, pt has been in and out of [facility] for the past 15 years. Pt's sister reports that pt was transferred to independent living in Jan 2019. Current OP psych Dr Courtney.

## 2019-09-04 NOTE — PROGRESS NOTE BEHAVIORAL HEALTH - NS ED BHA MED ROS PSYCHIATRIC
See HPI

## 2019-09-04 NOTE — PROGRESS NOTE BEHAVIORAL HEALTH - NSBHFUPINTERVALHXFT_PSY_A_CORE
Chart reviewed, pt seen and examined at bedside. Per nursing staff patient is quiet, isolative, no behavioral issues over the weekend, patient is medication compliant, not requiring PRN. Pt seen this AM in her room, states her last BM was yesterday. Patient is informed that she has a meeting with [facility] today and is encourage to shower and change into outside clothes. Patient is agreeable to plan. She reports AH, however states they are not bothersome stating that they say "good things about my family." Patient is encourage to maintain medication compliance after hospital discharge. She denies SI/HI/VH. Chart reviewed, pt seen and examined at bedside. Per nursing staff patient is medication compliant, not requiring PRN. Pt seen this AM in her room, states her last BM was today. Patient is encourage to shower and change into outside clothes. Patient is agreeable to plan. She reports AH, however states they are not bothersome. Patient expresses feeling sad. When asked about why she is sad she said it was because she was thinking about people who have passed away, specifically her mother. Patient states that therapy has helped alleviate the sadness.  Patient is encourage to maintain medication compliance after hospital discharge. She denies SI/HI/VH. Chart reviewed, pt seen and examined at bedside. Per nursing staff patient is medication compliant, not requiring PRN. Pt seen this AM in her room, states her last BM was today. Patient is encourage to shower and change into outside clothes. Patient is agreeable to plan. She reports AH, however states they are not bothersome. Patient expresses feeling sad. When asked about why she is sad she said it was because she was thinking about people who have passed away, specifically her mother. Patient states that therapy has helped alleviate the sadness.  Patient is encouraged to maintain medication compliance after hospital discharge. She denies SI/HI/VH.

## 2019-09-04 NOTE — CHART NOTE - NSCHARTNOTEFT_GEN_A_CORE
Social Work Note:    Patient is anticipated for discharger tomorrow - Thursday September 5th with plan of return to [facility] and to resume outpatient mental health treatment at [facility].  Patient is aware and agreeable to same.

## 2019-09-04 NOTE — PROGRESS NOTE BEHAVIORAL HEALTH - AFFECT RANGE
Blunted

## 2019-09-04 NOTE — PROGRESS NOTE BEHAVIORAL HEALTH - NSBHCHARTREVIEWVS_PSY_A_CORE FT
ICU Vital Signs Last 24 Hrs  T(C): 36.4 (04 Sep 2019 05:56), Max: 36.4 (03 Sep 2019 17:23)  T(F): 97.5 (04 Sep 2019 05:56), Max: 97.6 (03 Sep 2019 17:23)  HR: 85 (04 Sep 2019 05:56) (85 - 87)  BP: 110/70 (04 Sep 2019 05:56) (104/74 - 110/70)  BP(mean): --  ABP: --  ABP(mean): --  RR: 20 (04 Sep 2019 05:56) (16 - 20)  SpO2: --

## 2019-09-04 NOTE — PROGRESS NOTE BEHAVIORAL HEALTH - NSBHATTESTSEENBY_PSY_A_CORE
Attending Psychiatrist supervising NP/Trainee, meeting pt...
attending Psychiatrist without NP/Trainee
Attending Psychiatrist supervising NP/Trainee, meeting pt...
Attending Psychiatrist supervising NP/Trainee, meeting pt...
attending Psychiatrist without NP/Trainee
Attending Psychiatrist supervising NP/Trainee, meeting pt...
attending Psychiatrist without NP/Trainee
attending Psychiatrist without NP/Trainee

## 2019-09-04 NOTE — PROGRESS NOTE BEHAVIORAL HEALTH - BEHAVIOR
Cooperative

## 2019-09-04 NOTE — PROGRESS NOTE BEHAVIORAL HEALTH - PRIMARY DX
Paranoid schizophrenia

## 2019-09-04 NOTE — PROGRESS NOTE BEHAVIORAL HEALTH - NS ED BHA MED ROS CARDIOVASCULAR
No complaints
Unable to assess
No complaints
Unable to assess
No complaints
Unable to assess
No complaints
Unable to assess

## 2019-09-04 NOTE — PROGRESS NOTE BEHAVIORAL HEALTH - NSBHCHARTREVIEWINVESTIGATE_PSY_A_CORE FT
< from: 12 Lead ECG (08.30.19 @ 09:46) >    QTC Calculation(Bezet) 462 ms    < end of copied text >
< from: 12 Lead ECG (08.13.19 @ 16:12) >      QTC Calculation(Bezet) 469 ms    < end of copied text >    < from: 12 Lead ECG (08.13.19 @ 16:12) >
< from: 12 Lead ECG (08.13.19 @ 16:12) >      Ventricular Rate 100 BPM    Atrial Rate 100 BPM    P-R Interval 144 ms    QRS Duration 100 ms    Q-T Interval 364 ms    QTC Calculation(Bezet) 469 ms    P Axis 42 degrees    R Axis 210 degrees    T Axis 32 degrees    Diagnosis Line Normal sinus rhythm  Right superior axis deviation  Incomplete right bundle branch block  Abnormal ECG    Confirmed by Pavel Boo ([id number]) on 8/13/2019 7:45:20 PM    < end of copied text >
< from: 12 Lead ECG (08.13.19 @ 16:12) >      QTC Calculation(Bezet) 469 ms    < end of copied text >    < from: 12 Lead ECG (08.13.19 @ 16:12) >      QTC Calculation(Bezet) 469 ms    < end of copied text >    < from: 12 Lead ECG (08.13.19 @ 16:12) >        < end of copied text >
< from: 12 Lead ECG (08.13.19 @ 16:12) >      Ventricular Rate 100 BPM    Atrial Rate 100 BPM    P-R Interval 144 ms    QRS Duration 100 ms    Q-T Interval 364 ms    QTC Calculation(Bezet) 469 ms    P Axis 42 degrees    R Axis 210 degrees    T Axis 32 degrees    Diagnosis Line Normal sinus rhythm  Right superior axis deviation  Incomplete right bundle branch block  Abnormal ECG    Confirmed by Pavel Boo ([id number]) on 8/13/2019 7:45:20 PM    < end of copied text >
< from: 12 Lead ECG (08.13.19 @ 16:12) >      Ventricular Rate 100 BPM    Atrial Rate 100 BPM    P-R Interval 144 ms    QRS Duration 100 ms    Q-T Interval 364 ms    QTC Calculation(Bezet) 469 ms    P Axis 42 degrees    R Axis 210 degrees    T Axis 32 degrees    Diagnosis Line Normal sinus rhythm  Right superior axis deviation  Incomplete right bundle branch block  Abnormal ECG    Confirmed by Pavel Boo ([id number]) on 8/13/2019 7:45:20 PM    < end of copied text >
< from: 12 Lead ECG (08.30.19 @ 09:46) >    QTC Calculation(Bezet) 462 ms    < end of copied text >

## 2019-09-04 NOTE — DISCHARGE NOTE BEHAVIORAL HEALTH - NSTOBACCOREFERRAL_GEN_A_NCS
Yes Patient declined information Yes/Patient refused referral and registration to [facility]

## 2019-09-04 NOTE — DISCHARGE NOTE BEHAVIORAL HEALTH - NSBHDCTESTSFT_PSY_A_CORE
Complete Blood Count + Automated Diff (08.13.19 @ 15:21)    WBC Count: 8.53 K/uL    RBC Count: 4.68 M/uL    Hemoglobin: 13.1 g/dL    Hematocrit: 41.6 %    Mean Cell Volume: 88.9 fL    Mean Cell Hemoglobin: 28.0 pg    Mean Cell Hemoglobin Conc: 31.5 g/dL    Red Cell Distrib Width: 13.4 %    Platelet Count - Automated: 387 K/uL    Auto Neutrophil #: 5.98 K/uL    Auto Lymphocyte #: 1.67 K/uL    Auto Monocyte #: 0.55 K/uL    Auto Eosinophil #: 0.23 K/uL    Auto Basophil #: 0.06 K/uL    Auto Neutrophil %: 70.1: Differential percentages must be correlated with absolute numbers for  clinical significance. %    Auto Lymphocyte %: 19.6 %    Auto Monocyte %: 6.4 %    Auto Eosinophil %: 2.7 %    Auto Basophil %: 0.7 %    Auto Immature Granulocyte %: 0.5 %    Nucleated RBC: 0 /100 WBCs

## 2019-09-04 NOTE — PROGRESS NOTE BEHAVIORAL HEALTH - SUMMARY
Ms Robertson is a 48yo F, living at [facility], with a history of Paranoid Schizophrenia who presented to the ED after being referred by her residence for disorganized behavior. Patient appears to have been non compliant with her Clozaril, mirtazepine and her medical medication for the past 3 days. Patient has also been non compliant with outpatient psychiatric follow up which involved blood work to monitor her neutrophil count. It is unclear what the reason for her non compliance is, particularly Clozaril - side effect versus intentionally not wanting to take psychiatric medication. In addition to this, patient is said to be increasingly disorganised in behavior and is isolating herself and is also not compliant with her medical medication. Patient's outpatient psychiatrist would feel more comfortable if the titration of her Clozaril is done in an inpatient psychiatric setting.    Upon reassessment, patient reports decreased AH, does not appear to be RTIS or internally preoccupied, however she continues to present with constricted affect, concrete thought process. Patient has been medication compliant and is agreeable with discharge back to [facility]. Patient is currently danger to self and unable to care for self. She thus requires further psychiatric stabilization, medication management/ treatement requiring further IPP hospitalization. Will continue to optimize patient's current medication regimen.     #Paranoid Schizophrenia  -continue with clozapine titration, increased to 150 mg po QAM and 350mg po QHS  -Prolixin dec 37.5mg given 8/30, next dose due 9/13  -last ANC 8/13 5900, next due 9/10/19  -EKG on 8/30 WNL qtc 462ms  - c/w Propranolol 20mg TID for Akithisia  - c/w Ativan 1mg PRN Q12h for anxiety.    #Constipation  -c/w colace 100mg po TID  -c/w senna 2 tablets po QHS  -c/w magensium hydroxide 30mL po PRN  -last BM yesterday       #severe agitation  -for severe agitation no amenable to verbal redirection give Prolixin 5 mg po PRN q6, benadryl 25mg po PRN q6 with escalation to IM if patient danger to self/others    #DM  -as per IM recs, c/w lantus insulin 25 IU at bedtime, Pioglitazone 30mg PO daily, Humalog 3U subQ 3x/day before meals.       #HTN  -as per IM recs, c/w losartan 50mg PO Daily      #HLD  -as per IM recs, c/w atorvastatin 40mg po qhs Ms Robertson is a 48yo F, living at [facility], with a history of Paranoid Schizophrenia who presented to the ED after being referred by her residence for disorganized behavior. Patient appears to have been non compliant with her Clozaril, mirtazepine and her medical medication for the past 3 days. Patient has also been non compliant with outpatient psychiatric follow up which involved blood work to monitor her neutrophil count. It is unclear what the reason for her non compliance is, particularly Clozaril - side effect versus intentionally not wanting to take psychiatric medication. In addition to this, patient is said to be increasingly disorganised in behavior and is isolating herself and is also not compliant with her medical medication. Patient's outpatient psychiatrist would feel more comfortable if the titration of her Clozaril is done in an inpatient psychiatric setting.    Upon reassessment, patient reports decreased AH, does not appear to be RTIS or internally preoccupied, however she continues to present with constricted affect, concrete thought process. Patient has been medication compliant and is agreeable with discharge back to [facility]. Patient is currently danger to self and unable to care for self. She thus requires further psychiatric stabilization, medication management/ treatement requiring further IPP hospitalization. Will continue to optimize patient's current medication regimen.     #Paranoid Schizophrenia  -c/w clozapine 150 mg po QAM and 350mg po QHS  -Prolixin dec 37.5mg given 8/30, next dose due 9/13  -last ANC 8/13 5900, next due 9/10/19  -EKG on 8/30 WNL qtc 462ms  - c/w Propranolol 20mg TID for Akithisia  - c/w Ativan 1mg PRN Q12h for anxiety.    #Constipation  -c/w colace 100mg po TID  -c/w senna 2 tablets po QHS  -c/w magensium hydroxide 30mL po PRN  -last BM yesterday       #severe agitation  -for severe agitation no amenable to verbal redirection give Prolixin 5 mg po PRN q6, benadryl 25mg po PRN q6 with escalation to IM if patient danger to self/others    #DM  -as per IM recs, c/w lantus insulin 25 IU at bedtime, Pioglitazone 30mg PO daily, Humalog 3U subQ 3x/day before meals.       #HTN  -as per IM recs, c/w losartan 50mg PO Daily      #HLD  -as per IM recs, c/w atorvastatin 40mg po qhs

## 2019-09-04 NOTE — PROGRESS NOTE BEHAVIORAL HEALTH - AFFECT QUALITY
Euthymic
Depressed/Other
Other/Depressed
Depressed/Other
Euthymic
Other/Depressed
Depressed/Other
Euthymic
Euthymic
Other
Depressed
Euthymic
Depressed
Depressed/Other

## 2019-09-04 NOTE — PROGRESS NOTE BEHAVIORAL HEALTH - PERCEPTIONS
Auditory hallucinations/Other
Auditory hallucinations
Auditory hallucinations/No abnormalities
Auditory hallucinations
No abnormalities/Auditory hallucinations
No abnormalities/Auditory hallucinations
Auditory hallucinations/No abnormalities
No abnormalities/Auditory hallucinations
No abnormalities/Auditory hallucinations
Auditory hallucinations
Auditory hallucinations
No abnormalities

## 2019-09-04 NOTE — PROGRESS NOTE BEHAVIORAL HEALTH - NSBHADMITIPOBSFT_PSY_A_CORE
per unit protocol
routine.
routine.
per unit protocol
per unit protocol
routine
per unit protocol
routine
per unit protocol
routine.
routine

## 2019-09-04 NOTE — PROGRESS NOTE BEHAVIORAL HEALTH - NSBHADMITIPOBS_PSY_A_CORE
Routine observation

## 2019-09-04 NOTE — DISCHARGE NOTE BEHAVIORAL HEALTH - NSTOBACCOHOTLINE_GEN_A_NCS
[facility] Smokers Quitline ([id number]) [facility] Smokers Quitline ([id number])

## 2019-09-04 NOTE — PROGRESS NOTE BEHAVIORAL HEALTH - GAIT / STATION
Normal gait / station
Other
Normal gait / station
Other
Normal gait / station

## 2019-09-04 NOTE — PROGRESS NOTE BEHAVIORAL HEALTH - MODIFICATIONS
pt seen; discussed with resident.  Reports periodic aud hallucinations, but is without c/o or distress.  Pt to be assessed for return to her residence
see below
seen; discussed with student. No s/h ideation. Proceeding with discharge plan
see below

## 2019-09-04 NOTE — PROGRESS NOTE BEHAVIORAL HEALTH - BODY HABITUS
Obese
Well nourished/Average build
Obese

## 2019-09-04 NOTE — DISCHARGE NOTE BEHAVIORAL HEALTH - MEDICATION SUMMARY - MEDICATIONS TO STOP TAKING
I will STOP taking the medications listed below when I get home from the hospital:    mirtazapine 7.5 mg oral tablet  -- 1 tab(s) by mouth once a day (at bedtime) until told by MD to stop

## 2019-09-04 NOTE — PROGRESS NOTE BEHAVIORAL HEALTH - NSBHADMITDANGERSELF_PSY_A_CORE
unable to care for self
unable to care for self/got more isolated, disorganized
got more isolated, disorganized/unable to care for self
unable to care for self
unable to care for self/got more isolated, disorganized
unable to care for self
unable to care for self/got more isolated, disorganized
got more isolated, disorganized/unable to care for self
unable to care for self/got more isolated, disorganized

## 2019-09-04 NOTE — DISCHARGE NOTE BEHAVIORAL HEALTH - MEDICATION SUMMARY - MEDICATIONS TO TAKE
I will START or STAY ON the medications listed below when I get home from the hospital:    losartan 50 mg oral tablet  -- 1 tab(s) by mouth once a day x 14 days  until told by MD to stop   -- Indication: For Essential hypertension    propranoloL 20 mg oral tablet  -- 1 tab(s) by mouth 3 times a day x 14 days   Take daily until othewise instructed.   -- Indication: For Akasthesia     Lantus 100 units/mL subcutaneous solution  -- 25 unit(s) subcutaneous once a day (at bedtime) x 14 days  until told by MD to stop   -- Do not drink alcoholic beverages when taking this medication.  It is very important that you take or use this exactly as directed.  Do not skip doses or discontinue unless directed by your doctor.  Keep in refrigerator.  Do not freeze.    -- Indication: For Type 2 diabetes mellitus without complication, with long-term current use of insulin    insulin lispro (concentrated) 200 units/mL subcutaneous solution  -- 3 unit(s) subcutaneous 3 times a day x 14 days  until told by MD to stop   -- Indication: For Type 2 diabetes mellitus without complication, with long-term current use of insulin    pioglitazone 30 mg oral tablet  -- 1 tab(s) by mouth once a day x 14 days  until told by MD to stop   -- Indication: For Type 2 diabetes mellitus without complication, with long-term current use of insulin    atorvastatin 40 mg oral tablet  -- 1 tab(s) by mouth once a day (at bedtime) x 14 days  until told by MD to stop   -- Indication: For Hyperlipidemia    cloZAPine 100 mg oral tablet  -- 1 tab(s) by mouth once a day x 14 days  until told by MD to stop   -- Indication: For Paranoid schizophrenia    cloZAPine 200 mg oral tablet  -- 2 tab(s) by mouth once a day (at bedtime) x 14 days  until told by MD to stop   -- Indication: For Paranoid schizophrenia    docusate sodium 100 mg oral capsule  -- 1 cap(s) by mouth 3 times a day x 14 days   Take daily until otherwise instructed.   -- Indication: For Constipation

## 2019-09-04 NOTE — DISCHARGE NOTE BEHAVIORAL HEALTH - HPI (INCLUDE ILLNESS QUALITY, SEVERITY, DURATION, TIMING, CONTEXT, MODIFYING FACTORS, ASSOCIATED SIGNS AND SYMPTOMS)
Ms Robertson is a 49 year old  woman , single , has no children, unemployed, resides at [facility] , with a history of Paranoid Schizophrenia who presented  to the ED after being referred by her residence for on of disorganized behavior . Psychiatry consult was called for a mental health evaluation.    On approach, patient is sitting on a chair, calm and cooperative, not agitated, occasional lip smacking observed . She reports that she is here because she is not taking her medication. Patient reports that she is always feeling weak and tired but reports that she does not know if this is because of the medication or not. Patient was also asked why she is not taking her medical medications and patient states " I don't know". She denies acute symptoms of psychosis , param or depression. She denies current suicidal thoughts , intent or plan.       Collateral information was obtained from Landy , the Senior case manger of [facility]( [phone]). She reports that the staff referred patient to the ED today because patient had not been compliant with Clozaril and her medical medications , has been increasingly isolating , is observed to stare  at a wall or the  floor for long periods of time . She reports that patient has not been doing well since she was discharged from the inpatient psychiatric floor in July.   Collateral information was also  obtained from patient's outpatient psychiatrist Ms Land Kendelljaquelinefabiola ( [phone]) . She reports that she thinks patient should be admitted to the inpatient psychiatric floor because she has not taken her Clozaril in about 3-4 days (missed a total of 7 doses) , is not compliant with outpatient blood work  to check her ANC ( last was 5.82 on 7/9/19) . She reports that after patient's last ED visit , she received her prolixin Decanoate on August 2/2019 as scheduled and is due August 16, 2019 . She reports that she thinks patient's Clozaril should be re-started in an inpatient setting and also is concerned about patient blood sugar control as she does not appear to be taking her medical medication as well.

## 2019-09-04 NOTE — PROGRESS NOTE BEHAVIORAL HEALTH - AXIS III
Hypertension, Diabetes , Hypercholesterolemia

## 2019-09-04 NOTE — PROGRESS NOTE BEHAVIORAL HEALTH - CASE SUMMARY
as noted
Ms Robertson is a 48yo F, living at [facility], with a history of Paranoid Schizophrenia who presented to the ED after being referred by her residence for disorganized behavior. Patient appears to have been non compliant with her Clozaril, mirtazepine and her medical medication for the past 3 days. Patient has also been non compliant with outpatient psychiatric follow up which involved blood work to monitor her neutrophil count. It is unclear what the reason for her non compliance is, particularly Clozaril - side effect versus intentionally not wanting to take psychiatric medication. In addition to this, patient is said to be increasingly disorganised in behavior and is isolating herself and is also not compliant with her medical medication. patient's outpatient psychiatrist would feel more comfortable if the titration of her Clozaril is done in an inpatient psychiatric setting.  SW called [facility] to have meeting with team to assess if pt is at her baseline.
as noted
Pt. is 49 years CF with hx of Schizophrenia, PMH HTN, DM. She is adherent currently to meds. No SE. She reported she has sporadic BM, however she reported that she has 2-3 times weekly of BM, Will ovserve for Constipation.

## 2019-09-04 NOTE — PROGRESS NOTE BEHAVIORAL HEALTH - AFFECT CONGRUENCE
Congruent
Not congruent
Congruent
Not congruent
Congruent
Not congruent
Not congruent
Congruent
Congruent
Not congruent
Congruent

## 2019-09-04 NOTE — PROGRESS NOTE BEHAVIORAL HEALTH - NS ED BHA MSE GENERAL APPEARANCE
No deformities present/Well developed
No deformities present/Well developed
No deformities present
No deformities present
Well developed/No deformities present
No deformities present
No deformities present
No deformities present/Well developed
Well developed/No deformities present
No deformities present
Well developed/No deformities present
Well developed/No deformities present
No deformities present

## 2019-09-04 NOTE — PROGRESS NOTE BEHAVIORAL HEALTH - THOUGHT PROCESS
Other/Linear
Other/Linear
Linear/Other
Other/Linear
Linear/Other
Other/Linear
Linear/Other
Linear/Other
Other/Linear
Linear/Other

## 2019-09-05 VITALS
RESPIRATION RATE: 18 BRPM | TEMPERATURE: 97 F | DIASTOLIC BLOOD PRESSURE: 89 MMHG | HEART RATE: 87 BPM | SYSTOLIC BLOOD PRESSURE: 137 MMHG

## 2019-09-05 LAB
GLUCOSE BLDC GLUCOMTR-MCNC: 202 MG/DL — HIGH (ref 70–99)
GLUCOSE BLDC GLUCOMTR-MCNC: 221 MG/DL — HIGH (ref 70–99)

## 2019-09-05 PROCEDURE — 99231 SBSQ HOSP IP/OBS SF/LOW 25: CPT

## 2019-09-05 RX ORDER — CLOZAPINE 150 MG/1
1 TABLET, ORALLY DISINTEGRATING ORAL
Qty: 14 | Refills: 0
Start: 2019-09-05 | End: 2019-09-18

## 2019-09-05 RX ORDER — CLOZAPINE 150 MG/1
2 TABLET, ORALLY DISINTEGRATING ORAL
Qty: 14 | Refills: 0
Start: 2019-09-05 | End: 2019-09-11

## 2019-09-05 RX ORDER — LOSARTAN POTASSIUM 100 MG/1
1 TABLET, FILM COATED ORAL
Qty: 14 | Refills: 0
Start: 2019-09-05 | End: 2019-09-18

## 2019-09-05 RX ORDER — ATORVASTATIN CALCIUM 80 MG/1
1 TABLET, FILM COATED ORAL
Qty: 14 | Refills: 0
Start: 2019-09-05 | End: 2019-09-18

## 2019-09-05 RX ORDER — INSULIN GLARGINE 100 [IU]/ML
25 INJECTION, SOLUTION SUBCUTANEOUS
Qty: 1 | Refills: 0
Start: 2019-09-05 | End: 2019-09-18

## 2019-09-05 RX ORDER — INSULIN LISPRO 100/ML
3 VIAL (ML) SUBCUTANEOUS
Qty: 1 | Refills: 0
Start: 2019-09-05 | End: 2019-09-18

## 2019-09-05 RX ORDER — CLOZAPINE 150 MG/1
1 TABLET, ORALLY DISINTEGRATING ORAL
Qty: 7 | Refills: 0
Start: 2019-09-05

## 2019-09-05 RX ORDER — PIOGLITAZONE HYDROCHLORIDE 15 MG/1
1 TABLET ORAL
Qty: 14 | Refills: 0
Start: 2019-09-05 | End: 2019-09-18

## 2019-09-05 RX ORDER — DOCUSATE SODIUM 100 MG
1 CAPSULE ORAL
Qty: 42 | Refills: 0
Start: 2019-09-05 | End: 2019-09-18

## 2019-09-05 RX ORDER — CLOZAPINE 150 MG/1
2 TABLET, ORALLY DISINTEGRATING ORAL
Qty: 28 | Refills: 0
Start: 2019-09-05 | End: 2019-09-18

## 2019-09-05 RX ADMIN — Medication 2: at 11:39

## 2019-09-05 RX ADMIN — Medication 2: at 07:52

## 2019-09-05 RX ADMIN — CLOZAPINE 150 MILLIGRAM(S): 150 TABLET, ORALLY DISINTEGRATING ORAL at 09:31

## 2019-09-05 RX ADMIN — Medication 6 UNIT(S): at 07:52

## 2019-09-05 RX ADMIN — LOSARTAN POTASSIUM 50 MILLIGRAM(S): 100 TABLET, FILM COATED ORAL at 09:31

## 2019-09-05 RX ADMIN — Medication 20 MILLIGRAM(S): at 09:31

## 2019-09-05 RX ADMIN — Medication 6 UNIT(S): at 11:39

## 2019-09-05 RX ADMIN — PIOGLITAZONE HYDROCHLORIDE 30 MILLIGRAM(S): 15 TABLET ORAL at 09:32

## 2019-09-05 RX ADMIN — Medication 100 MILLIGRAM(S): at 09:31

## 2019-09-05 NOTE — PROGRESS NOTE ADULT - PROBLEM SELECTOR PROBLEM 1
Paranoid schizophrenia
Type 2 diabetes mellitus without complication, with long-term current use of insulin

## 2019-09-05 NOTE — PROGRESS NOTE ADULT - PROBLEM SELECTOR PLAN 1
continue present treatment as per psych plan as reviewed  Medically stable with no new changes in treatment  will continue to monitor medical status while being treated on psych
bs labile ntoed one abovce 300  rest ok contiune curren tx and encourage complaicne with tx paln
cont tx and cover at meals  encourage complaicne
cont tx as oreded bs stable vaaraible based on pt complaince
cotn tx and fsbs as ntoed  pt with compliance isseus with meds and diet
fsbbs varaible atr tiems as when does note eat or take nemds   overall stasble no new changes except encourage compliance
improved and stable on current tx
rosette eastman as oreded for daibetes and will monitor

## 2019-09-05 NOTE — CHART NOTE - NSCHARTNOTEFT_GEN_A_CORE
Social Work Discharge Note:    Patient is for discharge today.   She is alert and oriented x3.  Mood is improved.  Anxiety has decreased.  Insight and judgment have improved.  Suicidal / homicidal ideation denied.      Patient will be discharged to [facility] supportive housing program on the grounds of [place].  Plan is for referral to [facility] where patient will resume outpatient mental health treatment there.  She is aware and agreeable to same.      Patient is aware and agreeable to discharge today.

## 2019-09-05 NOTE — PROGRESS NOTE ADULT - SUBJECTIVE AND OBJECTIVE BOX
pt stable alert in NAD  no new complaints  lying in bed in nad  medical conuslt reviewd  known to us from previosu admits      PARANOID SCHIZOPHRENIA ;NONCOMPLIANCE WITH MEDICATION REGIMEN  ^MED EVAL  No pertinent family history in first degree relatives  Handoff  MEWS Score  Diabetes mellitus, type 2  Depression  Paranoid schizophrenia  Noncompliance with medication regimen  Paranoid schizophrenia  No significant past surgical history  MED EVAL  20  Noncompliance with medication regimen    HEALTH ISSUES - PROBLEM Dx:  Noncompliance with medication regimen: Noncompliance with medication regimen  Paranoid schizophrenia        PAST MEDICAL & SURGICAL HISTORY:  Diabetes mellitus, type 2  Depression  No significant past surgical history    penicillins (Unknown)      FAMILY HISTORY:  No pertinent family history in first degree relatives      atorvastatin 40 milliGRAM(s) Oral at bedtime  cloZAPine 25 milliGRAM(s) Oral at bedtime  diphenhydrAMINE 25 milliGRAM(s) Oral every 6 hours PRN  fluPHENAZine 5 milliGRAM(s) Oral every 6 hours PRN  haloperidol     Tablet 5 milliGRAM(s) Oral every 8 hours PRN  insulin glargine Injectable (LANTUS) 25 Unit(s) SubCutaneous at bedtime  insulin lispro Injectable (HumaLOG) 3 Unit(s) SubCutaneous three times a day before meals  LORazepam     Tablet 1 milliGRAM(s) Oral every 8 hours PRN  losartan 50 milliGRAM(s) Oral daily  pioglitazone 30 milliGRAM(s) Oral daily      T(C): 37 (08-15-19 @ 06:19), Max: 37 (08-15-19 @ 06:19)  HR: 70 (08-15-19 @ 06:19) (70 - 92)  BP: 123/74 (08-15-19 @ 06:19) (123/74 - 158/96)  RR: 20 (08-15-19 @ 06:19) (17 - 20)  SpO2: --    PE;  general:  stasble in nad    Lungs:    Heart:    EXT: from    Neuro: alert nod eficits      13.1  41.6  8.53  11  0.8  4.8  240      08-13    140  |  106  |  11  ----------------------------<  240<H>  4.8   |  23  |  0.8    Ca    9.7      13 Aug 2019 15:21                                  13.1   8.53  )-----------( 387      ( 13 Aug 2019 15:21 )             41.6       CAPILLARY BLOOD GLUCOSE      POCT Blood Glucose.: 188 mg/dL (15 Aug 2019 06:35)  POCT Blood Glucose.: 302 mg/dL (14 Aug 2019 20:10)  POCT Blood Glucose.: 245 mg/dL (14 Aug 2019 16:45)  POCT Blood Glucose.: 329 mg/dL (14 Aug 2019 11:28)
pt stable alert in NAD  no new complaints    PARANOID SCHIZOPHRENIA;NONCOMPLIANCE WITH MEDICATION REGIMEN  ^MED EVAL  No pertinent family history in first degree relatives  Handoff  MEWS Score  Diabetes mellitus, type 2  Depression  Paranoid schizophrenia  Essential hypertension  Hypertension  Type 2 diabetes mellitus without complication, with long-term current use of insulin  Noncompliance with medication regimen  Paranoid schizophrenia  No significant past surgical history  MED EVAL  20  Noncompliance with medication regimen    HEALTH ISSUES - PROBLEM Dx:  Essential hypertension: Essential hypertension  Hypertension: Hypertension  Type 2 diabetes mellitus without complication, with long-term current use of insulin: Type 2 diabetes mellitus without complication, with long-term current use of insulin  Noncompliance with medication regimen: Noncompliance with medication regimen  Paranoid schizophrenia        PAST MEDICAL & SURGICAL HISTORY:  Diabetes mellitus, type 2  Depression  No significant past surgical history    penicillins (Unknown)      FAMILY HISTORY:  No pertinent family history in first degree relatives      atorvastatin 40 milliGRAM(s) Oral at bedtime  cloZAPine 150 milliGRAM(s) Oral daily  cloZAPine 350 milliGRAM(s) Oral at bedtime  dextrose 40% Gel 15 Gram(s) Oral once PRN  dextrose 5%. 1000 milliLiter(s) IV Continuous <Continuous>  dextrose 50% Injectable 12.5 Gram(s) IV Push once  dextrose 50% Injectable 25 Gram(s) IV Push once  dextrose 50% Injectable 25 Gram(s) IV Push once  diphenhydrAMINE 25 milliGRAM(s) Oral every 6 hours PRN  docusate sodium 100 milliGRAM(s) Oral three times a day  fluPHENAZine 5 milliGRAM(s) Oral every 6 hours PRN  glucagon  Injectable 1 milliGRAM(s) IntraMuscular once PRN  insulin glargine Injectable (LANTUS) 25 Unit(s) SubCutaneous at bedtime  insulin lispro (HumaLOG) corrective regimen sliding scale   SubCutaneous three times a day before meals  insulin lispro Injectable (HumaLOG) 6 Unit(s) SubCutaneous three times a day before meals  losartan 50 milliGRAM(s) Oral daily  magnesium hydroxide Suspension 30 milliLiter(s) Oral daily PRN  pioglitazone 30 milliGRAM(s) Oral daily  propranolol 20 milliGRAM(s) Oral three times a day  senna 2 Tablet(s) Oral at bedtime      T(C): 36.4 (09-04-19 @ 05:56), Max: 36.4 (09-03-19 @ 17:23)  HR: 85 (09-04-19 @ 05:56) (85 - 96)  BP: 110/70 (09-04-19 @ 05:56) (98/57 - 110/70)  RR: 20 (09-04-19 @ 05:56) (16 - 20)  SpO2: --    PE;  general:  no cahnges noted  lying in bed    Lungs:    Heart:    EXT:    Neuro:  alert no defciits                          CAPILLARY BLOOD GLUCOSE      POCT Blood Glucose.: 97 mg/dL (04 Sep 2019 06:40)  POCT Blood Glucose.: 158 mg/dL (03 Sep 2019 20:10)  POCT Blood Glucose.: 181 mg/dL (03 Sep 2019 16:19)  POCT Blood Glucose.: 141 mg/dL (03 Sep 2019 11:25)
pt stable alert in NAD  no new complaints    PARANOID SCHIZOPHRENIA;NONCOMPLIANCE WITH MEDICATION REGIMEN  ^MED EVAL  No pertinent family history in first degree relatives  Handoff  MEWS Score  Diabetes mellitus, type 2  Depression  Paranoid schizophrenia  Essential hypertension  Hypertension  Type 2 diabetes mellitus without complication, with long-term current use of insulin  Noncompliance with medication regimen  Paranoid schizophrenia  No significant past surgical history  MED EVAL  20  Noncompliance with medication regimen    HEALTH ISSUES - PROBLEM Dx:  Essential hypertension: Essential hypertension  Hypertension: Hypertension  Type 2 diabetes mellitus without complication, with long-term current use of insulin: Type 2 diabetes mellitus without complication, with long-term current use of insulin  Noncompliance with medication regimen: Noncompliance with medication regimen  Paranoid schizophrenia        PAST MEDICAL & SURGICAL HISTORY:  Diabetes mellitus, type 2  Depression  No significant past surgical history    penicillins (Unknown)      FAMILY HISTORY:  No pertinent family history in first degree relatives      atorvastatin 40 milliGRAM(s) Oral at bedtime  cloZAPine 150 milliGRAM(s) Oral daily  cloZAPine 350 milliGRAM(s) Oral at bedtime  dextrose 40% Gel 15 Gram(s) Oral once PRN  dextrose 5%. 1000 milliLiter(s) IV Continuous <Continuous>  dextrose 50% Injectable 12.5 Gram(s) IV Push once  dextrose 50% Injectable 25 Gram(s) IV Push once  dextrose 50% Injectable 25 Gram(s) IV Push once  diphenhydrAMINE 25 milliGRAM(s) Oral every 6 hours PRN  docusate sodium 100 milliGRAM(s) Oral three times a day  fluPHENAZine 5 milliGRAM(s) Oral every 6 hours PRN  glucagon  Injectable 1 milliGRAM(s) IntraMuscular once PRN  insulin glargine Injectable (LANTUS) 25 Unit(s) SubCutaneous at bedtime  insulin lispro (HumaLOG) corrective regimen sliding scale   SubCutaneous three times a day before meals  insulin lispro Injectable (HumaLOG) 6 Unit(s) SubCutaneous three times a day before meals  losartan 50 milliGRAM(s) Oral daily  magnesium hydroxide Suspension 30 milliLiter(s) Oral daily PRN  pioglitazone 30 milliGRAM(s) Oral daily  propranolol 20 milliGRAM(s) Oral three times a day  senna 2 Tablet(s) Oral at bedtime      T(C): 36.8 (09-05-19 @ 05:51), Max: 36.8 (09-05-19 @ 05:51)  HR: 87 (09-05-19 @ 05:51) (87 - 107)  BP: 115/61 (09-05-19 @ 05:51) (99/59 - 115/61)  RR: 18 (09-05-19 @ 05:51) (16 - 18)  SpO2: --    PE;  general:  no acute cahnges in nnad    Lungs:    Heart:    EXT:    Neuro:  alert no deficits                          CAPILLARY BLOOD GLUCOSE      POCT Blood Glucose.: 202 mg/dL (05 Sep 2019 06:30)  POCT Blood Glucose.: 149 mg/dL (04 Sep 2019 16:34)  POCT Blood Glucose.: 262 mg/dL (04 Sep 2019 11:53)
pt stable alert in NAD  no new complaints    PARANOID SCHIZOPHRENIA;NONCOMPLIANCE WITH MEDICATION REGIMEN  ^MED EVAL  No pertinent family history in first degree relatives  Handoff  MEWS Score  Diabetes mellitus, type 2  Depression  Paranoid schizophrenia  Hypertension  Type 2 diabetes mellitus without complication, with long-term current use of insulin  Noncompliance with medication regimen  Paranoid schizophrenia  No significant past surgical history  MED EVAL  20  Noncompliance with medication regimen    HEALTH ISSUES - PROBLEM Dx:  Hypertension: Hypertension  Type 2 diabetes mellitus without complication, with long-term current use of insulin: Type 2 diabetes mellitus without complication, with long-term current use of insulin  Noncompliance with medication regimen: Noncompliance with medication regimen  Paranoid schizophrenia        PAST MEDICAL & SURGICAL HISTORY:  Diabetes mellitus, type 2  Depression  No significant past surgical history    penicillins (Unknown)      FAMILY HISTORY:  No pertinent family history in first degree relatives      atorvastatin 40 milliGRAM(s) Oral at bedtime  cloZAPine 100 milliGRAM(s) Oral at bedtime  dextrose 40% Gel 15 Gram(s) Oral once PRN  dextrose 5%. 1000 milliLiter(s) IV Continuous <Continuous>  dextrose 50% Injectable 12.5 Gram(s) IV Push once  dextrose 50% Injectable 25 Gram(s) IV Push once  dextrose 50% Injectable 25 Gram(s) IV Push once  diphenhydrAMINE 25 milliGRAM(s) Oral every 6 hours PRN  fluPHENAZine 5 milliGRAM(s) Oral every 6 hours PRN  glucagon  Injectable 1 milliGRAM(s) IntraMuscular once PRN  haloperidol     Tablet 5 milliGRAM(s) Oral every 8 hours PRN  insulin glargine Injectable (LANTUS) 25 Unit(s) SubCutaneous at bedtime  insulin lispro (HumaLOG) corrective regimen sliding scale   SubCutaneous three times a day before meals  insulin lispro Injectable (HumaLOG) 6 Unit(s) SubCutaneous three times a day before meals  LORazepam     Tablet 1 milliGRAM(s) Oral every 8 hours PRN  losartan 50 milliGRAM(s) Oral daily  pioglitazone 30 milliGRAM(s) Oral daily  propranolol 20 milliGRAM(s) Oral three times a day      T(C): 35.9 (08-21-19 @ 06:01), Max: 36.7 (08-20-19 @ 18:28)  HR: 93 (08-21-19 @ 06:01) (93 - 98)  BP: 132/72 (08-21-19 @ 06:01) (124/86 - 155/99)  RR: 20 (08-21-19 @ 06:01) (18 - 20)  SpO2: --    PE;  general:  no cahges noted lying in bed    Lungs:    Heart:    EXT:    Neuro: alert no deficits                          CAPILLARY BLOOD GLUCOSE      POCT Blood Glucose.: 167 mg/dL (21 Aug 2019 06:29)  POCT Blood Glucose.: 319 mg/dL (20 Aug 2019 22:41)  POCT Blood Glucose.: 152 mg/dL (20 Aug 2019 16:05)  POCT Blood Glucose.: 184 mg/dL (20 Aug 2019 10:55)
pt stable alert in NAD  no new complaints    PARANOID SCHIZOPHRENIA;NONCOMPLIANCE WITH MEDICATION REGIMEN  ^MED EVAL  No pertinent family history in first degree relatives  Handoff  MEWS Score  Diabetes mellitus, type 2  Depression  Paranoid schizophrenia  Hypertension  Type 2 diabetes mellitus without complication, with long-term current use of insulin  Noncompliance with medication regimen  Paranoid schizophrenia  No significant past surgical history  MED EVAL  20  Noncompliance with medication regimen    HEALTH ISSUES - PROBLEM Dx:  Hypertension: Hypertension  Type 2 diabetes mellitus without complication, with long-term current use of insulin: Type 2 diabetes mellitus without complication, with long-term current use of insulin  Noncompliance with medication regimen: Noncompliance with medication regimen  Paranoid schizophrenia        PAST MEDICAL & SURGICAL HISTORY:  Diabetes mellitus, type 2  Depression  No significant past surgical history    penicillins (Unknown)      FAMILY HISTORY:  No pertinent family history in first degree relatives      atorvastatin 40 milliGRAM(s) Oral at bedtime  cloZAPine 150 milliGRAM(s) Oral two times a day  dextrose 40% Gel 15 Gram(s) Oral once PRN  dextrose 5%. 1000 milliLiter(s) IV Continuous <Continuous>  dextrose 50% Injectable 12.5 Gram(s) IV Push once  dextrose 50% Injectable 25 Gram(s) IV Push once  dextrose 50% Injectable 25 Gram(s) IV Push once  diphenhydrAMINE 25 milliGRAM(s) Oral every 6 hours PRN  fluPHENAZine 5 milliGRAM(s) Oral every 6 hours PRN  glucagon  Injectable 1 milliGRAM(s) IntraMuscular once PRN  haloperidol     Tablet 5 milliGRAM(s) Oral every 8 hours PRN  insulin glargine Injectable (LANTUS) 25 Unit(s) SubCutaneous at bedtime  insulin lispro (HumaLOG) corrective regimen sliding scale   SubCutaneous three times a day before meals  insulin lispro Injectable (HumaLOG) 6 Unit(s) SubCutaneous three times a day before meals  LORazepam     Tablet 1 milliGRAM(s) Oral every 12 hours PRN  losartan 50 milliGRAM(s) Oral daily  pioglitazone 30 milliGRAM(s) Oral daily  propranolol 20 milliGRAM(s) Oral three times a day      T(C): 36.2 (08-26-19 @ 05:48), Max: 36.2 (08-26-19 @ 05:48)  HR: 84 (08-26-19 @ 05:48) (84 - 95)  BP: 117/74 (08-26-19 @ 05:48) (111/57 - 117/74)  RR: 20 (08-26-19 @ 05:48) (20 - 20)  SpO2: --    PE;  general:  no cahnges noted from previosu innad    Lungs:    Heart:    EXT:    Neuro:  aelrt no defciits lyign in bed                          CAPILLARY BLOOD GLUCOSE      POCT Blood Glucose.: 159 mg/dL (26 Aug 2019 06:29)  POCT Blood Glucose.: 229 mg/dL (25 Aug 2019 20:51)  POCT Blood Glucose.: 265 mg/dL (25 Aug 2019 20:08)  POCT Blood Glucose.: 109 mg/dL (25 Aug 2019 16:16)  POCT Blood Glucose.: 162 mg/dL (25 Aug 2019 10:52)
pt stable alert in NAD  no new complaints    PARANOID SCHIZOPHRENIA;NONCOMPLIANCE WITH MEDICATION REGIMEN  ^MED EVAL  No pertinent family history in first degree relatives  Handoff  MEWS Score  Diabetes mellitus, type 2  Depression  Paranoid schizophrenia  Hypertension  Type 2 diabetes mellitus without complication, with long-term current use of insulin  Noncompliance with medication regimen  Paranoid schizophrenia  No significant past surgical history  MED EVAL  20  Noncompliance with medication regimen    HEALTH ISSUES - PROBLEM Dx:  Hypertension: Hypertension  Type 2 diabetes mellitus without complication, with long-term current use of insulin: Type 2 diabetes mellitus without complication, with long-term current use of insulin  Noncompliance with medication regimen: Noncompliance with medication regimen  Paranoid schizophrenia        PAST MEDICAL & SURGICAL HISTORY:  Diabetes mellitus, type 2  Depression  No significant past surgical history    penicillins (Unknown)      FAMILY HISTORY:  No pertinent family history in first degree relatives      atorvastatin 40 milliGRAM(s) Oral at bedtime  cloZAPine 200 milliGRAM(s) Oral two times a day  dextrose 40% Gel 15 Gram(s) Oral once PRN  dextrose 5%. 1000 milliLiter(s) IV Continuous <Continuous>  dextrose 50% Injectable 12.5 Gram(s) IV Push once  dextrose 50% Injectable 25 Gram(s) IV Push once  dextrose 50% Injectable 25 Gram(s) IV Push once  diphenhydrAMINE 25 milliGRAM(s) Oral every 6 hours PRN  docusate sodium 100 milliGRAM(s) Oral three times a day  fluPHENAZine 5 milliGRAM(s) Oral every 6 hours PRN  glucagon  Injectable 1 milliGRAM(s) IntraMuscular once PRN  insulin glargine Injectable (LANTUS) 25 Unit(s) SubCutaneous at bedtime  insulin lispro (HumaLOG) corrective regimen sliding scale   SubCutaneous three times a day before meals  insulin lispro Injectable (HumaLOG) 6 Unit(s) SubCutaneous three times a day before meals  LORazepam     Tablet 1 milliGRAM(s) Oral every 12 hours PRN  losartan 50 milliGRAM(s) Oral daily  pioglitazone 30 milliGRAM(s) Oral daily  propranolol 20 milliGRAM(s) Oral three times a day  senna 2 Tablet(s) Oral at bedtime      T(C): 36.7 (08-28-19 @ 06:14), Max: 36.8 (08-27-19 @ 08:34)  HR: 90 (08-28-19 @ 06:14) (69 - 104)  BP: 129/71 (08-28-19 @ 06:14) (116/61 - 143/67)  RR: 20 (08-28-19 @ 06:14) (16 - 20)  SpO2: --    PE;  general:  stable lying in bed in nad    Lungs:    Heart:    EXT:    Neuro:  alert no defciits                          CAPILLARY BLOOD GLUCOSE      POCT Blood Glucose.: 112 mg/dL (28 Aug 2019 06:27)  POCT Blood Glucose.: 222 mg/dL (27 Aug 2019 19:58)  POCT Blood Glucose.: 179 mg/dL (27 Aug 2019 16:06)  POCT Blood Glucose.: 153 mg/dL (27 Aug 2019 11:18)
pt stable alert in NAD  no new complaints    PARANOID SCHIZOPHRENIA;NONCOMPLIANCE WITH MEDICATION REGIMEN  ^MED EVAL  No pertinent family history in first degree relatives  Handoff  MEWS Score  Diabetes mellitus, type 2  Depression  Paranoid schizophrenia  Hypertension  Type 2 diabetes mellitus without complication, with long-term current use of insulin  Noncompliance with medication regimen  Paranoid schizophrenia  No significant past surgical history  MED EVAL  20  Noncompliance with medication regimen    HEALTH ISSUES - PROBLEM Dx:  Hypertension: Hypertension  Type 2 diabetes mellitus without complication, with long-term current use of insulin: Type 2 diabetes mellitus without complication, with long-term current use of insulin  Noncompliance with medication regimen: Noncompliance with medication regimen  Paranoid schizophrenia        PAST MEDICAL & SURGICAL HISTORY:  Diabetes mellitus, type 2  Depression  No significant past surgical history    penicillins (Unknown)      FAMILY HISTORY:  No pertinent family history in first degree relatives      atorvastatin 40 milliGRAM(s) Oral at bedtime  cloZAPine 25 milliGRAM(s) Oral two times a day  diphenhydrAMINE 25 milliGRAM(s) Oral every 6 hours PRN  fluPHENAZine 5 milliGRAM(s) Oral every 6 hours PRN  fluPHENAZine decanoate Injectable, Long Acting 37.5 milliGRAM(s) IntraMuscular once  haloperidol     Tablet 5 milliGRAM(s) Oral every 8 hours PRN  insulin glargine Injectable (LANTUS) 25 Unit(s) SubCutaneous at bedtime  insulin lispro Injectable (HumaLOG) 3 Unit(s) SubCutaneous three times a day before meals  LORazepam     Tablet 1 milliGRAM(s) Oral every 8 hours PRN  losartan 50 milliGRAM(s) Oral daily  pioglitazone 30 milliGRAM(s) Oral daily      T(C): 36.7 (08-16-19 @ 06:03), Max: 36.7 (08-16-19 @ 06:03)  HR: 75 (08-16-19 @ 06:03) (75 - 88)  BP: 128/83 (08-16-19 @ 06:03) (128/83 - 147/87)  RR: 18 (08-16-19 @ 06:03) (18 - 18)  SpO2: --    PE;  general:  lying in bed innad    Lungs:    Heart:    EXT:    Neuro:  alertt no deficits      13.1  41.6  8.53  11  0.8  4.8  240                      CAPILLARY BLOOD GLUCOSE      POCT Blood Glucose.: 125 mg/dL (16 Aug 2019 06:53)  POCT Blood Glucose.: 290 mg/dL (15 Aug 2019 20:20)  POCT Blood Glucose.: 246 mg/dL (15 Aug 2019 16:12)  POCT Blood Glucose.: 229 mg/dL (15 Aug 2019 11:06)
pt stable alert in NAD  no new complaints    PARANOID SCHIZOPHRENIA;NONCOMPLIANCE WITH MEDICATION REGIMEN  ^MED EVAL  No pertinent family history in first degree relatives  Handoff  MEWS Score  Diabetes mellitus, type 2  Depression  Paranoid schizophrenia  Hypertension  Type 2 diabetes mellitus without complication, with long-term current use of insulin  Noncompliance with medication regimen  Paranoid schizophrenia  No significant past surgical history  MED EVAL  20  Noncompliance with medication regimen    HEALTH ISSUES - PROBLEM Dx:  Hypertension: Hypertension  Type 2 diabetes mellitus without complication, with long-term current use of insulin: Type 2 diabetes mellitus without complication, with long-term current use of insulin  Noncompliance with medication regimen: Noncompliance with medication regimen  Paranoid schizophrenia        PAST MEDICAL & SURGICAL HISTORY:  Diabetes mellitus, type 2  Depression  No significant past surgical history    penicillins (Unknown)      FAMILY HISTORY:  No pertinent family history in first degree relatives      atorvastatin 40 milliGRAM(s) Oral at bedtime  cloZAPine 75 milliGRAM(s) Oral at bedtime  dextrose 40% Gel 15 Gram(s) Oral once PRN  dextrose 5%. 1000 milliLiter(s) IV Continuous <Continuous>  dextrose 50% Injectable 12.5 Gram(s) IV Push once  dextrose 50% Injectable 25 Gram(s) IV Push once  dextrose 50% Injectable 25 Gram(s) IV Push once  diphenhydrAMINE 25 milliGRAM(s) Oral every 6 hours PRN  fluPHENAZine 5 milliGRAM(s) Oral every 6 hours PRN  glucagon  Injectable 1 milliGRAM(s) IntraMuscular once PRN  haloperidol     Tablet 5 milliGRAM(s) Oral every 8 hours PRN  insulin glargine Injectable (LANTUS) 25 Unit(s) SubCutaneous at bedtime  insulin lispro (HumaLOG) corrective regimen sliding scale   SubCutaneous three times a day before meals  insulin lispro Injectable (HumaLOG) 6 Unit(s) SubCutaneous three times a day before meals  LORazepam     Tablet 1 milliGRAM(s) Oral every 8 hours PRN  losartan 50 milliGRAM(s) Oral daily  pioglitazone 30 milliGRAM(s) Oral daily      T(C): 36.4 (08-18-19 @ 16:19), Max: 36.4 (08-18-19 @ 16:19)  HR: 94 (08-18-19 @ 16:19) (73 - 94)  BP: 110/78 (08-18-19 @ 16:19) (110/78 - 119/72)  RR: 18 (08-18-19 @ 16:19) (16 - 18)  SpO2: --    PE;  general:  stasble no cahnges noted innad    Lungs:    Heart:    EXT:    Neuro:    no deficits alert                        CAPILLARY BLOOD GLUCOSE      POCT Blood Glucose.: 177 mg/dL (19 Aug 2019 06:52)  POCT Blood Glucose.: 280 mg/dL (18 Aug 2019 19:43)  POCT Blood Glucose.: 160 mg/dL (18 Aug 2019 16:09)  POCT Blood Glucose.: 95 mg/dL (18 Aug 2019 11:31)

## 2019-09-05 NOTE — PROGRESS NOTE ADULT - PROBLEM SELECTOR PLAN 2
will monitor on inuslin and cover mealtime bs for now  adjsut over next few days as needed
bp stable
cont to encoruage complaicne with tx
continue present treatment as per psych plan as reviewed  Medically stable with no new changes in treatment  will continue to monitor medical status while being treated on psych
encourage compliance with medications and diet
stable on meds

## 2019-09-05 NOTE — PROGRESS NOTE ADULT - PROBLEM SELECTOR PLAN 3
continue present treatment as per psych plan as reviewed  Medically stable with no new changes in treatment  will continue to monitor medical status while being treated on psych  medically stable for d/c today as per psych with clsoe outpt f/u

## 2019-09-05 NOTE — PROGRESS NOTE ADULT - REASON FOR ADMISSION
psych evaluation

## 2019-09-05 NOTE — PROGRESS NOTE ADULT - PROBLEM SELECTOR PROBLEM 2
Type 2 diabetes mellitus without complication, with long-term current use of insulin
Essential hypertension
Essential hypertension
Noncompliance with medication regimen
Noncompliance with medication regimen
Paranoid schizophrenia

## 2019-09-05 NOTE — PROGRESS NOTE ADULT - ASSESSMENT
medically stable uncontrolled diabetes
medically stable weleveatd labile bs
medically stable with no acute issues
medically stable with no acute issues
medically stable with no acute issues  bs reviwed and stable
medically stable with no acute issues  fsbs as noted adrashida portillo
medically stable with no acute issues  fsbs noted
medically stable with no acute issues fsbs stasble

## 2019-09-09 DIAGNOSIS — K59.00 CONSTIPATION, UNSPECIFIED: ICD-10-CM

## 2019-09-09 DIAGNOSIS — E11.9 TYPE 2 DIABETES MELLITUS WITHOUT COMPLICATIONS: ICD-10-CM

## 2019-09-09 DIAGNOSIS — F20.0 PARANOID SCHIZOPHRENIA: ICD-10-CM

## 2019-09-09 DIAGNOSIS — Z91.19 PATIENT'S NONCOMPLIANCE WITH OTHER MEDICAL TREATMENT AND REGIMEN: ICD-10-CM

## 2019-09-09 DIAGNOSIS — Z79.4 LONG TERM (CURRENT) USE OF INSULIN: ICD-10-CM

## 2019-09-09 DIAGNOSIS — I10 ESSENTIAL (PRIMARY) HYPERTENSION: ICD-10-CM

## 2019-09-09 DIAGNOSIS — E78.5 HYPERLIPIDEMIA, UNSPECIFIED: ICD-10-CM

## 2019-09-09 DIAGNOSIS — Z91.14 PATIENT'S OTHER NONCOMPLIANCE WITH MEDICATION REGIMEN: ICD-10-CM

## 2019-09-09 DIAGNOSIS — R45.1 RESTLESSNESS AND AGITATION: ICD-10-CM

## 2019-09-11 ENCOUNTER — OUTPATIENT (OUTPATIENT)
Dept: OUTPATIENT SERVICES | Facility: HOSPITAL | Age: 49
LOS: 1 days | Discharge: HOME | End: 2019-09-11

## 2019-09-11 DIAGNOSIS — Z79.899 OTHER LONG TERM (CURRENT) DRUG THERAPY: ICD-10-CM

## 2019-09-11 DIAGNOSIS — F20.9 SCHIZOPHRENIA, UNSPECIFIED: ICD-10-CM

## 2019-09-19 ENCOUNTER — EMERGENCY (EMERGENCY)
Facility: HOSPITAL | Age: 49
LOS: 0 days | Discharge: HOME | End: 2019-09-20
Attending: EMERGENCY MEDICINE | Admitting: EMERGENCY MEDICINE
Payer: MEDICARE

## 2019-09-19 VITALS
HEART RATE: 97 BPM | SYSTOLIC BLOOD PRESSURE: 132 MMHG | TEMPERATURE: 96 F | DIASTOLIC BLOOD PRESSURE: 89 MMHG | OXYGEN SATURATION: 97 % | RESPIRATION RATE: 18 BRPM

## 2019-09-19 DIAGNOSIS — R41.89 OTHER SYMPTOMS AND SIGNS INVOLVING COGNITIVE FUNCTIONS AND AWARENESS: ICD-10-CM

## 2019-09-19 DIAGNOSIS — F25.9 SCHIZOAFFECTIVE DISORDER, UNSPECIFIED: ICD-10-CM

## 2019-09-19 DIAGNOSIS — E11.9 TYPE 2 DIABETES MELLITUS WITHOUT COMPLICATIONS: ICD-10-CM

## 2019-09-19 DIAGNOSIS — Z79.4 LONG TERM (CURRENT) USE OF INSULIN: ICD-10-CM

## 2019-09-19 PROCEDURE — 99283 EMERGENCY DEPT VISIT LOW MDM: CPT

## 2019-09-19 NOTE — ED PROVIDER NOTE - PHYSICAL EXAMINATION
Constitutional: Well developed, well nourished. NAD  Head: Normocephalic, atraumatic.  Eyes: PERRL, EOMI.  ENT: No nasal discharge. Mucous membranes dry.  Neck: Supple. Painless ROM.  Cardiovascular: Regular rate and rhythm.   Pulmonary: Lungs clear to auscultation bilaterally.   Abdominal: Soft. Nondistended. No rebound, guarding, rigidity.  Extremities. Pelvis stable. No lower extremity edema, symmetric calves.  Skin: No rashes, cyanosis.  Neuro: AAOx3. No focal neurological deficits.  Psych: Normal mood. Normal affect.

## 2019-09-19 NOTE — ED PROVIDER NOTE - CARE PROVIDER_API CALL
Purnima Dos Santos)  Internal Medicine  [address] [place] [address]  Phone: [phone]  Fax: [phone]  Follow Up Time: 1-3 Days

## 2019-09-19 NOTE — ED PROVIDER NOTE - OBJECTIVE STATEMENT
49 yold female to Ed [facility] Dm, Htn, HLD, depression, asthma, schizoaffective disorder; pt s/p recent admission to [facility](s) psych for depression - d/c 1 week ago to [facility] [place] - pt sent to Ed because pt refusing insulin, fingersticks and being uncoopreative on unit; pt in ED calm, cooperative; denies cp, sob, n/v, fever, chills, abdominal pain, urinary sx; pt agrees to take insulin on unit and have fs checked;

## 2019-09-19 NOTE — ED ADULT TRIAGE NOTE - CHIEF COMPLAINT QUOTE
Patient brought from [facility], per staff at [facility] patient has been noncompliant with blood sugar testing, allowed EMS to check blood sugar, XE=185 with EMS. Patient compliant in triage, denies any suicidal or homicidal thoughts at this time

## 2019-09-19 NOTE — ED PROVIDER NOTE - CLINICAL SUMMARY MEDICAL DECISION MAKING FREE TEXT BOX
50 y/o female with hx of schizoaffective d/o, DM, was sent to ED for eval after being noncompliant with fingersticks and insulin at [facility]. No evidence of DKA, HONK. No complaints. normal exam. Pt instructed to continue taking insulin and let staff perform fingersticks. Results reviewed and discussed with pt and printed for patient. Anticipatory guidance given including close outpatient followup. Strict return precautions given. Pt verbalizes understanding of and agrees with plan.

## 2019-09-19 NOTE — ED PROVIDER NOTE - NS ED ROS FT
Constitutional: No fever, chills.  Eyes: No visual changes.  ENT: No hearing changes.  Neck: No neck pain or stiffness.  Cardiovascular: No chest pain, palpitations, edema.  Pulmonary: No SOB, cough. No hemoptysis.  Abdominal:  No nausea, vomiting, diarrhea.  : No dysuria, frequency.  Neuro: No headache, syncope, dizziness.  MS: No back pain. No calf pain/swelling.  Psych: No suicidal ideations.

## 2019-09-19 NOTE — ED ADULT NURSE NOTE - CHIEF COMPLAINT QUOTE
Patient brought from [facility], per staff at [facility] patient has been noncompliant with blood sugar testing, allowed EMS to check blood sugar, PJ=365 with EMS. Patient compliant in triage, denies any suicidal or homicidal thoughts at this time

## 2019-09-19 NOTE — ED PROVIDER NOTE - CARE PLAN
Principal Discharge DX:	Diabetes mellitus, type 2  Secondary Diagnosis:	Schizoaffective disorder, unspecified

## 2019-09-19 NOTE — ED PROVIDER NOTE - PATIENT PORTAL LINK FT
You can access the FollowMyHealth Patient Portal offered by [facility] by registering at the following website: [email or web address]. By joining [facility]’s FollowMyHealth portal, you will also be able to view your health information using other applications (apps) compatible with our system.

## 2019-09-19 NOTE — ED PROVIDER NOTE - ATTENDING CONTRIBUTION TO CARE
I personally evaluated the patient. I reviewed the Resident’s or Physician Assistant’s note (as assigned above), and agree with the findings and plan except as documented in my note.    Pt is a 48 y/o female with hx of DM, HTN, DLD, depression, schizoaffective d/o was sent to ED from [facility] pt has not been compliant with insulin and not letting the staff do her fingersticks. Sx's are constant, mild. No chest pain, SOB, cough, abd pain, n/v/d, urinary complaints. FS was 179 today.    Constitutional: Well developed, well nourished. NAD.  Head: Normocephalic, atraumatic.  Eyes: PERRL. EOMI.  ENT: No nasal discharge. Mucous membranes moist.  Neck: Supple. Painless ROM.  Cardiovascular: Normal S1, S2. Regular rate and rhythm. No murmurs, rubs, or gallops.  Pulmonary: Normal respiratory rate and effort. Lungs clear to auscultation bilaterally. No wheezing, rales, or rhonchi.  Abdominal: Soft. Nondistended. Nontender. No rebound, guarding, rigidity.  Extremities. Pelvis stable. No lower extremity edema, symmetric calves.  Skin: No rashes, cyanosis.  Neuro: AAOx3. No focal neurological deficits.  Psych: Normal mood. Normal affect.

## 2019-09-20 ENCOUNTER — APPOINTMENT (OUTPATIENT)
Dept: ENDOCRINOLOGY | Facility: CLINIC | Age: 49
End: 2019-09-20

## 2019-09-20 NOTE — ED ADULT NURSE REASSESSMENT NOTE - NS ED NURSE REASSESS COMMENT FT1
pt picked up by primary care ambulance to be transferred to [facility]. pt willing accepted to go back to [facility].  pt in no acute distress at this time.

## 2019-09-25 ENCOUNTER — OUTPATIENT (OUTPATIENT)
Dept: OUTPATIENT SERVICES | Facility: HOSPITAL | Age: 49
LOS: 1 days | Discharge: HOME | End: 2019-09-25

## 2019-09-25 ENCOUNTER — APPOINTMENT (OUTPATIENT)
Dept: ENDOCRINOLOGY | Facility: CLINIC | Age: 49
End: 2019-09-25

## 2019-09-25 VITALS
HEIGHT: 63 IN | WEIGHT: 251 LBS | DIASTOLIC BLOOD PRESSURE: 68 MMHG | BODY MASS INDEX: 44.47 KG/M2 | HEART RATE: 90 BPM | SYSTOLIC BLOOD PRESSURE: 130 MMHG

## 2019-09-25 DIAGNOSIS — E66.01 MORBID (SEVERE) OBESITY DUE TO EXCESS CALORIES: ICD-10-CM

## 2019-09-25 DIAGNOSIS — E11.9 TYPE 2 DIABETES MELLITUS W/OUT COMPLICATIONS: ICD-10-CM

## 2019-09-25 DIAGNOSIS — E78.5 HYPERLIPIDEMIA, UNSPECIFIED: ICD-10-CM

## 2019-09-25 RX ORDER — LINAGLIPTIN 5 MG/1
5 TABLET, FILM COATED ORAL
Qty: 90 | Refills: 3 | Status: ACTIVE | COMMUNITY
Start: 2018-01-24 | End: 1900-01-01

## 2019-09-25 RX ORDER — BLOOD-GLUCOSE METER
W/DEVICE EACH MISCELLANEOUS
Qty: 1 | Refills: 0 | Status: ACTIVE | COMMUNITY
Start: 2019-09-25 | End: 1900-01-01

## 2019-09-25 RX ORDER — BENZONATATE 200 MG/1
200 CAPSULE ORAL
Qty: 30 | Refills: 0 | Status: DISCONTINUED | COMMUNITY
Start: 2019-04-24 | End: 2019-09-25

## 2019-09-25 RX ORDER — BLOOD SUGAR DIAGNOSTIC
STRIP MISCELLANEOUS 3 TIMES DAILY
Qty: 300 | Refills: 2 | Status: ACTIVE | COMMUNITY
Start: 2017-03-08 | End: 1900-01-01

## 2019-09-25 NOTE — HISTORY OF PRESENT ILLNESS
[FreeTextEntry1] : 50y/o F with PMH DM-2 on insulin, HLD, HTN, depression, hx of schizoaffective disorder presents for follow-up. Last seen in January and was sent for blood work but not done. Last A1c 7 in Oct '18. \par \par Reports that his blood sugar is 'very good', reports . became depressed, stopped all meds, and then was admitted to [facility], and discharged home on 4 insulin injections daily. almost 50 years old, still on depot medroxyprogesterone for contraception.  she does not remember taking jardiance.

## 2019-09-25 NOTE — ASSESSMENT
[FreeTextEntry1] : talk to gyn team, and possibly stop depot medroxyprogesterone. restart toujeo and orals, except for jardiance which she may not be taking. stop novolog. [Carbohydrate Consistent Diet] : carbohydrate consistent diet [Diabetes Foot Care] : diabetes foot care [Long Term Vascular Complications] : long term vascular complications of diabetes [Importance of Diet and Exercise] : importance of diet and exercise to improve glycemic control, achieve weight loss and improve cardiovascular health [Action and use of Insulin] : action and use of short and long-acting insulin

## 2019-09-25 NOTE — PHYSICAL EXAM
[Alert] : alert [No Acute Distress] : no acute distress [Well Nourished] : well nourished [Well Developed] : well developed [Normal Sclera/Conjunctiva] : normal sclera/conjunctiva [EOMI] : extra ocular movement intact [No Proptosis] : no proptosis [Normal Oropharynx] : the oropharynx was normal [Thyroid Not Enlarged] : the thyroid was not enlarged [No Thyroid Nodules] : there were no palpable thyroid nodules [No Respiratory Distress] : no respiratory distress [No Accessory Muscle Use] : no accessory muscle use [Clear to Auscultation] : lungs were clear to auscultation bilaterally [Normal Rate] : heart rate was normal  [Regular Rhythm] : with a regular rhythm [Normal S1, S2] : normal S1 and S2 [Pedal Pulses Normal] : the pedal pulses are present [No Edema] : there was no peripheral edema [Normal Bowel Sounds] : normal bowel sounds [Not Tender] : non-tender [Soft] : abdomen soft [Not Distended] : not distended [Post Cervical Nodes] : posterior cervical nodes [Anterior Cervical Nodes] : anterior cervical nodes [Normal] : normal and non tender [Axillary Nodes] : axillary nodes [No Spinal Tenderness] : no spinal tenderness [Spine Straight] : spine straight [Normal Gait] : normal gait [No Stigmata of Cushings Syndrome] : no stigmata of cushings syndrome [No Rash] : no rash [Acanthosis Nigricans] : no acanthosis nigricans [Normal Strength/Tone] : muscle strength and tone were normal [Normal Reflexes] : deep tendon reflexes were 2+ and symmetric [No Tremors] : no tremors [Oriented x3] : oriented to person, place, and time

## 2019-10-09 ENCOUNTER — OUTPATIENT (OUTPATIENT)
Dept: OUTPATIENT SERVICES | Facility: HOSPITAL | Age: 49
LOS: 1 days | Discharge: HOME | End: 2019-10-09

## 2019-10-09 DIAGNOSIS — F20.9 SCHIZOPHRENIA, UNSPECIFIED: ICD-10-CM

## 2019-10-09 DIAGNOSIS — Z79.899 OTHER LONG TERM (CURRENT) DRUG THERAPY: ICD-10-CM

## 2019-11-06 ENCOUNTER — OUTPATIENT (OUTPATIENT)
Dept: OUTPATIENT SERVICES | Facility: HOSPITAL | Age: 49
LOS: 1 days | Discharge: HOME | End: 2019-11-06

## 2019-11-06 DIAGNOSIS — F20.9 SCHIZOPHRENIA, UNSPECIFIED: ICD-10-CM

## 2019-11-06 DIAGNOSIS — Z79.899 OTHER LONG TERM (CURRENT) DRUG THERAPY: ICD-10-CM

## 2019-11-21 NOTE — INPATIENT CERTIFICATION FOR MEDICARE PATIENTS - IN ORDER TO MEET MEDICARE REQUIREMENTS.
OB follow up     Joycelyn Jasso In order to meet Medicare requirements, the clinical documentation must support the information cited in the admission order.  Please be sure to provide detailed and clear documentation about the following in the admitting note/history and physical:

## 2019-12-04 ENCOUNTER — OUTPATIENT (OUTPATIENT)
Dept: OUTPATIENT SERVICES | Facility: HOSPITAL | Age: 49
LOS: 1 days | Discharge: HOME | End: 2019-12-04

## 2019-12-04 ENCOUNTER — APPOINTMENT (OUTPATIENT)
Dept: OBGYN | Facility: CLINIC | Age: 49
End: 2019-12-04

## 2019-12-04 DIAGNOSIS — F20.9 SCHIZOPHRENIA, UNSPECIFIED: ICD-10-CM

## 2019-12-04 DIAGNOSIS — Z79.899 OTHER LONG TERM (CURRENT) DRUG THERAPY: ICD-10-CM

## 2019-12-18 ENCOUNTER — APPOINTMENT (OUTPATIENT)
Dept: ENDOCRINOLOGY | Facility: CLINIC | Age: 49
End: 2019-12-18

## 2019-12-31 ENCOUNTER — OUTPATIENT (OUTPATIENT)
Dept: OUTPATIENT SERVICES | Facility: HOSPITAL | Age: 49
LOS: 1 days | Discharge: HOME | End: 2019-12-31

## 2019-12-31 DIAGNOSIS — F20.9 SCHIZOPHRENIA, UNSPECIFIED: ICD-10-CM

## 2019-12-31 DIAGNOSIS — Z79.899 OTHER LONG TERM (CURRENT) DRUG THERAPY: ICD-10-CM

## 2020-01-08 ENCOUNTER — APPOINTMENT (OUTPATIENT)
Dept: OBGYN | Facility: CLINIC | Age: 50
End: 2020-01-08

## 2020-01-14 ENCOUNTER — RX RENEWAL (OUTPATIENT)
Age: 50
End: 2020-01-14

## 2020-01-14 RX ORDER — PEN NEEDLE, DIABETIC 29 G X1/2"
31G X 8 MM NEEDLE, DISPOSABLE MISCELLANEOUS
Qty: 1 | Refills: 3 | Status: ACTIVE | COMMUNITY
Start: 2018-06-05 | End: 1900-01-01

## 2020-01-14 RX ORDER — EMPAGLIFLOZIN 10 MG/1
10 TABLET, FILM COATED ORAL
Qty: 90 | Refills: 3 | Status: ACTIVE | COMMUNITY
Start: 2018-01-30 | End: 1900-01-01

## 2020-01-19 ENCOUNTER — RX RENEWAL (OUTPATIENT)
Age: 50
End: 2020-01-19

## 2020-01-28 RX ORDER — LANCETS 33 GAUGE
EACH MISCELLANEOUS
Qty: 1 | Refills: 5 | Status: ACTIVE | COMMUNITY
Start: 2017-03-08 | End: 1900-01-01

## 2020-01-28 NOTE — PATIENT PROFILE BEHAVIORAL HEALTH - EXTENSIONS OF SELF_ADULT
1038 paged hospitalist     Griffin Pisano  01/28/20 1038  [phone] hospitalist returned call      Griffin Pisano  01/28/20 [phone]
Report to Curt Vasquez on [place] at [address] [facility],[address], [facility]  01/28/20 0677
None

## 2020-01-30 ENCOUNTER — RX RENEWAL (OUTPATIENT)
Age: 50
End: 2020-01-30

## 2020-03-11 ENCOUNTER — APPOINTMENT (OUTPATIENT)
Dept: INTERNAL MEDICINE | Facility: CLINIC | Age: 50
End: 2020-03-11

## 2020-04-07 NOTE — H&P ADULT - PROBLEM SELECTOR PROBLEM 1
Received pt in bed at change of shift with eyes closed; chest movement noted  Continues the same thus this far as per continual rounding   Will continue to monitor  Paranoid schizophrenia

## 2020-09-14 NOTE — PROGRESS NOTE BEHAVIORAL HEALTH - SUMMARY
Ms Robertson is a 50yo F, living at [facility], with a history of Paranoid Schizophrenia who presented to the ED after being referred by her residence for disorganized behavior. Patient appears to have been non compliant with her Clozaril, mirtazepine and her medical medication for the past 3 days. Patient has also been non compliant with outpatient psychiatric follow up which involved blood work to monitor her neutrophil count. It is unclear what the reason for her non compliance is, particularly Clozaril - side effect versus intentionally not wanting to take psychiatric medication. In addition to this , patient is said to be increasingly disorganised in behavior and is isolating her self and is also not compliant with her medical medication. patient's outpatient psychiatrist would feel more comfortable if the titration of her Clozaril is done in an inpatient psychiatric setting.    Patient is due for her Prolixin Decanote 37.5mg on August 16, 2019.     Risk Assessment (consider static vs modifiable risk factors and protective factors; comment on level of risk for dangerous behavior): At this time, patient is considered a danger to herself and others and needs involuntary inpatient hospitalization for medication management and due to increased disorganization. SUBJECTIVE  This 83 year old female returns for ongoing diabetic foot management. Comprehensive diabetic foot exam has established the patient as a Foot Risk Category 1, with recommendations for follow up every 3-6 months (per the ADA task force with AACE endorsement).  Special attention today to PAD evaluation and patient education.   PAD questionnaire: 0/4 positive responses  PAD symtpoms:  no claudication, +  temperature changes feet (cold), +  lower extremity edema, no nocturnal leg cramps  PAD risk factors: +  hypertension, +  hyperlipidemia,  +  DM > 10 years,  nocigarette smoking, no history vascular  surgery      Last visit with provider managing their DM: Aliza 2/19/2020    ALLERGIES:  No Known Allergies  Current Outpatient Medications   Medication Sig Dispense Refill   • zoster vaccine recomb adjuvanted (SHINGRIX) 50 MCG/0.5ML injection Inject 0.5 mLs into the muscle 1 time. Repeat dose in 2 to 6 months (unless 1 dose already given), for a total of 2 doses. 1 each 1   • losartan-hydrochlorothiazide (HYZAAR) 100-25 MG per tablet Take 1 tablet by mouth daily. 90 tablet 1   • gemfibrozil (LOPID) 600 MG tablet Take 1 tablet by mouth 2 times daily. 180 tablet 1   • doxazosin (CARDURA) 4 MG tablet Take 1 tablet by mouth daily. 90 tablet 1   • alendronate (FOSAMAX) 70 MG tablet TAKE 1 TABLET BY MOUTH EVERY 7 DAYS. TAKE WITH FULL GLASS OF WATER AND REMAIN UPRIGHT FOR 30 MINUTES AFTER TAKING. 12 tablet 0   • triamcinolone (ARISTOCORT) 0.1 % cream Apply twice daily strictly to affected areas for 2 weeks; AVOID use on face and body folds 80 g 1   • Multiple Vitamins-Minerals (MULTIVITAMIN WOMEN 50+ PO) Take 1 tablet by mouth daily.     • docusate calcium (SURFAK) 240 MG capsule Take by mouth daily.     • Coenzyme Q10 10 MG Cap Take by mouth daily.     • aspirin 81 MG chewable tablet Chew 81 mg by mouth daily.      • Omega-3 Fatty Acids (FISH OIL) 1000 MG capsule daily.     • Calcium Carb-Cholecalciferol (CALCIUM +  D3) 600-200 MG-UNIT Tab Take by mouth daily.       No current facility-administered medications for this visit.      Patient Active Problem List   Diagnosis   • History of total knee replacement, left   • Weakness   • Difficulty walking   • Chronic pain of left knee   • Essential hypertension, benign   • Fracture of femur, distal, left, closed (CMS/[facility])   • Hyperlipidemia   • Osteopenia   • Type 2 diabetes mellitus without complication (CMS/[facility])   • History of femur fracture   • Malunion of fracture   • Post-traumatic osteoarthritis of knees, bilateral   • Primary localized osteoarthrosis, lower leg   • Primary osteoarthritis of both knees   • Supracondylar fracture of left femur (CMS/[facility])   • Multiple thyroid nodules   • Dupuytren's disease of palm of both hands     Social History     Tobacco Use   Smoking Status Never Smoker   Smokeless Tobacco Never Used       LAB REVIEW:  Lab Results   Component Value Date    HEMOGLOBINAO 6.4 (H) 09/04/2019     Lab Results   Component Value Date    LDLCHOLCALCU 190 (H) 09/04/2019       ROS:  Peripheral vascular: +  temperature changes feet (cold), +  lower extremity edema  Neurologic:  +  paresthesia, no burning, no numbness  Musculoskeletal:  no charcot, no amputation, no physical constraints preventing movement  Integument:  no decreased sweating, no prior skin ulcer  HEENT:  no visual impairment, no retinopathy  General:  no cognitive impairment  Activity Level:  ADL       PHYSICAL EXAM  Constitutional  No acute distress    Vascular  Pedal pulses DP  1/3, bilateral PT  1/3, bilateral  CFT  2 seconds, toes bilateral         CAMMY: 1.30 with biphasic wave form    Edema: minimal    Neurologic  Loss of protective sensation:  10g monofilament detected at 4/5 locations on the left and 4/5 locations right  Large fiber function:  Biothesiometer testing in volts 40 hallux left and 40 hallux right  (VPT > 25 increased risk of foot ulceration)    Small fiber function:  Pinprick sensation:  positive  left  Positive  right  Deep tendon reflexes:  Achilles 1 left and 1 right    Musculoskeletal  Foot structure: hammer toe  deformity 2 bilateral; tailor's bunion right   Muscle mass:  +  visible small muscle atrophy, bilateral  Muscle strength:  5/5 bilateral  Gait and biomechanics:  apropulsive gait     Integument  Toe nails:  dry, brittle, thick   Hair growth: none  Pigmentation changes: no hyperpigmentation   Skin color: no pallor, no rubor  Skin texture: thin, shiny; no excessive dryness   Web spaces: clean and dry   Lesions: callus plantar 5th metatarsal head right foot; tip of 2nd toe right foot    Foot wear  Shoes are  appropriate style,  appropriate size, good condition    ASSESSMENT  Diabetic Foot Grade: 1; Comprehensive diabetic foot exam  Due 11/2020  (2008 ADA report on comprehensive foot examination and risk assessment:    Grade 0 is no LOPS and no PAD; confers no additional ulcer risk and is considered not at risk;   Grade 1 exhibits LOPS and no PAD; patient is at 2X greater ulcer risk for ulcer and is considered low-moderate risk;   Grade 2 exhibits LOPS and PAD; patient is at 12X greater ulcer risk and is considered high risk;   Grade 3 has history of foot ulcer/amputation; patient is at 36-70X greater ulcer risk and is considered very high risk)    Foot deformity that may contribute to ulceration: hammer toe and tailor's bunion with callus    Metatarsalgia right foot        PLAN  Diabetic foot evaluation documenting peripheral involvement as reviewed and indicated above.  Diabetic foot care provided, including callus and nail care.  Counseling on diabetic risk factors today, with > 50% of 15 minute visit spent on PAD education and on exercise strategies to improve/maintain blood flow.   The details of this discussion are outlined in the patient instructions. Based on these results, we will continue surveillance CAMMY here and order arterial duplex if deterioration is noted.  Return for  recheck in 3 months; sooner if any changes in condition.  To check feet daily for for sores, blisters, red areas, any warm or hot spots. If noted, contact us immediately.    Electronically signed by: Ashwini Del Castillo DPM  9/14/2020

## 2020-10-06 ENCOUNTER — APPOINTMENT (OUTPATIENT)
Dept: OPHTHALMOLOGY | Facility: CLINIC | Age: 50
End: 2020-10-06

## 2020-10-07 ENCOUNTER — RX RENEWAL (OUTPATIENT)
Age: 50
End: 2020-10-07

## 2020-10-29 ENCOUNTER — RX RENEWAL (OUTPATIENT)
Age: 50
End: 2020-10-29

## 2020-11-04 ENCOUNTER — APPOINTMENT (OUTPATIENT)
Dept: OPHTHALMOLOGY | Facility: CLINIC | Age: 50
End: 2020-11-04

## 2020-11-04 ENCOUNTER — OUTPATIENT (OUTPATIENT)
Dept: OUTPATIENT SERVICES | Facility: HOSPITAL | Age: 50
LOS: 1 days | Discharge: HOME | End: 2020-11-04
Payer: MEDICARE

## 2020-11-04 PROCEDURE — 92004 COMPRE OPH EXAM NEW PT 1/>: CPT

## 2020-11-05 DIAGNOSIS — E11.9 TYPE 2 DIABETES MELLITUS WITHOUT COMPLICATIONS: ICD-10-CM

## 2020-11-05 DIAGNOSIS — H02.20C: ICD-10-CM

## 2020-11-05 DIAGNOSIS — H04.123 DRY EYE SYNDROME OF BILATERAL LACRIMAL GLANDS: ICD-10-CM

## 2020-11-27 ENCOUNTER — APPOINTMENT (OUTPATIENT)
Dept: PODIATRY | Facility: CLINIC | Age: 50
End: 2020-11-27

## 2020-12-04 ENCOUNTER — RX RENEWAL (OUTPATIENT)
Age: 50
End: 2020-12-04

## 2020-12-04 RX ORDER — PEN NEEDLE, DIABETIC 31 G X1/4"
31G X 8 MM NEEDLE, DISPOSABLE MISCELLANEOUS
Qty: 100 | Refills: 2 | Status: ACTIVE | COMMUNITY
Start: 2020-01-19 | End: 1900-01-01

## 2020-12-29 ENCOUNTER — RX RENEWAL (OUTPATIENT)
Age: 50
End: 2020-12-29

## 2020-12-30 NOTE — PROGRESS NOTE BEHAVIORAL HEALTH - NSBHPTASSESSDT_PSY_A_CORE
14-Aug-2019 14:57
15-Aug-2019 18:44
16-Aug-2019 12:37
17-Aug-2019 17:18
19-Aug-2019 12:20
21-Aug-2019 09:14
22-Aug-2019 11:30
23-Aug-2019 12:24
27-Aug-2019 10:03
29-Aug-2019 13:08
cellulitis of LLE x  2week not improved with medication   home med   clindamycin Q8 hrs\  repeat the xray and leg elevation   dvt negative   pain control reeval
03-Sep-2019 11:40
28-Aug-2019 11:23
26-Aug-2019 14:29
30-Aug-2019 12:15
20-Aug-2019 11:10
cellulitis of LLE x  2week not improved with po medication ( Augmentin newly started x 2 days ago )    continue  home med   lactate negative   clindamycin Q8 hrs and lactobacillus PO  prednisone 40mg daily x 5 days  for cellulitis   repeat the xray of the / ankle and tib fib   LLE elevation   pain control re-eval in AM
04-Sep-2019 09:04

## 2021-01-08 NOTE — ED PROVIDER NOTE - NSFOLLOWUPINSTRUCTIONS_ED_ALL_ED_FT
EMERGENCY DEPARTMENT ENCOUNTER    CHIEF COMPLAINT  Chief Complaint: Altered mental status  History given by: Patient/EMS  History limited by: Intoxication  Room Number: 14/14  PMD: Elizabeth Puente MD      HPI:  Pt is a 65 y.o. female who presents to the emergency room today after being found at the bottom of her stairs at her home by her family.  Per their reports, the patient is an alcoholic and has had substance abuse issues in the past.  She admits to several drinks tonight as well as drinking quite a bit of her narcotic cough syrup.  The patient denies any oral narcotics since yesterday, however per family report, there is a bottle of oxycodone that is currently unaccounted for.  There have been no recent reports fever/chills, chest pain, shortness of breath, or known sick contacts.  The patient currently only complains of her chronic right lower extremity pain from a previous leg fracture.  She describes that pain is a dull sensation and to the right lower leg and nonradiating.    Duration:  unknown  Onset: unknown  Intensity/Severity: moderate  Aggravating Factors: none  Alleviating Factors: none  Treatment before arrival: none    PAST MEDICAL HISTORY  Active Ambulatory Problems     Diagnosis Date Noted   • Acute renal failure (CMS/HCC) 05/03/2016   • Diverticulitis of intestine 05/02/2016   • Hypertension 04/06/2017   • Hyponatremia 05/03/2016   • Macrocytic anemia 05/03/2016   • Metabolic acidosis 05/03/2016   • Vulvitis 04/06/2017   • Dyslipidemia 12/30/2019   • NICM (nonischemic cardiomyopathy) (CMS/HCC) 12/30/2019   • Dense breast tissue on mammogram 01/07/2020   • Lichen sclerosus of female genitalia 07/15/2020     Resolved Ambulatory Problems     Diagnosis Date Noted   • No Resolved Ambulatory Problems     Past Medical History:   Diagnosis Date   • Abnormal LFTs (liver function tests)    • Abnormality on patient-activated cardiac event recorder    • Acute pain of left foot    • Aphthous ulcer  of tongue    • Cardiomyopathy (CMS/HCC)    • Cervical radiculitis 2016   • Chest discomfort    • Chondromalacia patellae, right knee 2017   • Colon cancer screening    • Contusion of right knee 2017   • Degeneration of cervical intervertebral disc    • Degenerative joint disease 2016   • Depression 2015   • Encounter for long-term (current) use of high-risk medication    • Encounter for screening colonoscopy    • Folate deficiency    • H/O cancer antigen 125 (CA-125) measurement    • H/O Doppler ultrasound    • History of alcohol abuse 2018   • History of bone density study 2018   • History of cardiomyopathy    • History of Papanicolaou smear of cervix    • HPV test positive    • Hyperlipidemia    • Hypothyroidism    • Internal hemorrhoid    • Menopause    • Neck pain    • Normal coronary arteries    • Osteopenia 2018   • Pharyngeal abscess    • Sinusitis    • Spinal stenosis 2016   • Sprain of sacroiliac joint 2017   • Staphylococcal infection    • Trigeminal neuralgia    • Urge incontinence    • Vitamin B 12 deficiency        PAST SURGICAL HISTORY  Past Surgical History:   Procedure Laterality Date   • CATARACT EXTRACTION Bilateral 2016   •  SECTION  1995    Baby Girl BERNA (Freshman @ [facility] )   • CHOLECYSTECTOMY  1963   • COLONOSCOPY  2014    Diverticulosis sigmoid, internal hemorrhoids   • HERNIA REPAIR      Age 19   • KNEE ARTHROSCOPY INCISION AND DRAINAGE OF KNEE Right  &     after fall   • SINUS SURGERY      X 2 in  and in        FAMILY HISTORY  Family History   Problem Relation Age of Onset   • Lupus Mother         Systemic Lupus Erythematosus (  in her sleep @ 73)   • Leukemia Father         CLL Had it for 14 years.   • Colon polyps Other         Family History   • Colon cancer Other         Family History   • Other Sister         breast lumpectomy   • No Known Problems Brother    • Scoliosis  Daughter    • Heart disease Maternal Grandmother    • Anuerysm Maternal Grandmother 80   • No Known Problems Paternal Grandmother         90's   • Breast cancer Paternal Aunt 78   • No Known Problems Maternal Grandfather    • No Known Problems Paternal Grandfather         90's   • No Known Problems Sister    • Diabetes type II Brother         Diet controlled    • BRCA 1/2 Neg Hx    • Endometrial cancer Neg Hx    • Ovarian cancer Neg Hx        SOCIAL HISTORY  Social History     Socioeconomic History   • Marital status:      Spouse name: CRISTINO   • Number of children: 1   • Years of education: Not on file   • Highest education level: Not on file   Occupational History     Employer: RETIRED   Tobacco Use   • Smoking status: Never Smoker   • Smokeless tobacco: Never Used   Substance and Sexual Activity   • Alcohol use: Yes     Comment: no more than 2-3 drinks in a setting. hx of excessive alc use   • Drug use: No   • Sexual activity: Yes     Partners: Male     Comment: spouse = CRISTINO, with ED.       ALLERGIES  Cefdinir, Latex, and Amoxicillin-pot clavulanate    REVIEW OF SYSTEMS  Review of Systems   Constitutional: Negative for fever.   HENT: Negative for sore throat.    Eyes: Negative.    Respiratory: Negative for cough and shortness of breath.    Cardiovascular: Negative for chest pain.   Gastrointestinal: Negative for abdominal pain, diarrhea and vomiting.   Genitourinary: Negative for dysuria.   Musculoskeletal: Negative for neck pain.        Chronic right lower extremity pain   Skin: Negative for rash.   Allergic/Immunologic: Negative.    Neurological: Negative for weakness, numbness and headaches.   Hematological: Negative.    Psychiatric/Behavioral: Negative.    All other systems reviewed and are negative.      PHYSICAL EXAM  ED Triage Vitals [01/08/21 0052]   Temp Heart Rate Resp BP SpO2   97.2 °F (36.2 °C) 75 14 98/53 100 %      Temp src Heart Rate Source Patient Position BP Location FiO2 (%)   Tympanic  Monitor Sitting -- --       Physical Exam   Constitutional: She is oriented to person, place, and time. No distress.   Patient appears intoxicated and is mildly slurring her speech   HENT:   Head: Normocephalic and atraumatic.   Eyes: Pupils are equal, round, and reactive to light. EOM are normal.   Neck: Normal range of motion. Neck supple.   Cardiovascular: Normal rate, regular rhythm and normal heart sounds.   Pulmonary/Chest: Effort normal and breath sounds normal. No respiratory distress.   Abdominal: Soft. There is no abdominal tenderness. There is no rebound and no guarding.   Musculoskeletal: Normal range of motion.         General: No edema.   Neurological: She is alert and oriented to person, place, and time. She has normal sensation and normal strength.   Skin: Skin is warm and dry. No rash noted.   Psychiatric: Mood and affect normal.   Nursing note and vitals reviewed.      LAB RESULTS  Lab Results (last 24 hours)     Procedure Component Value Units Date/Time    CBC & Differential [[id number]]  (Abnormal) Collected: 01/08/21 0134    Specimen: Blood Updated: 01/08/21 0150    Narrative:      The following orders were created for panel order CBC & Differential.  Procedure                               Abnormality         Status                     ---------                               -----------         ------                     CBC Auto Differential[[id number]]        Abnormal            Final result                 Please view results for these tests on the individual orders.    Comprehensive Metabolic Panel [[id number]]  (Abnormal) Collected: 01/08/21 0134    Specimen: Blood Updated: 01/08/21 0233     Glucose 85 mg/dL      BUN 6 mg/dL      Creatinine 0.54 mg/dL      Sodium 116 mmol/L      Potassium 3.0 mmol/L      Chloride 83 mmol/L      CO2 17.6 mmol/L      Calcium 7.6 mg/dL      Total Protein 5.9 g/dL      Albumin 4.00 g/dL      ALT (SGPT) 86 U/L      AST (SGOT) 183 U/L      Alkaline Phosphatase 243  U/L      Total Bilirubin 0.7 mg/dL      eGFR Non African Amer 113 mL/min/1.73      Globulin 1.9 gm/dL      A/G Ratio 2.1 g/dL      BUN/Creatinine Ratio 11.1     Anion Gap 15.4 mmol/L     Narrative:      GFR Normal >60  Chronic Kidney Disease <60  Kidney Failure <15      Acetaminophen Level [[id number]]  (Normal) Collected: 01/08/21 0134    Specimen: Blood Updated: 01/08/21 0217     Acetaminophen <5.0 mcg/mL     Ethanol [[id number]]  (Abnormal) Collected: 01/08/21 0134    Specimen: Blood Updated: 01/08/21 0217     Ethanol 143 mg/dL      Ethanol % 0.143 %     Urine Drug Screen - Urine, Catheter [[id number]]  (Abnormal) Collected: 01/08/21 0134    Specimen: Urine, Catheter Updated: 01/08/21 0235     Amphet/Methamphet, Screen Negative     Barbiturates Screen, Urine Negative     Benzodiazepine Screen, Urine Negative     Cocaine Screen, Urine Negative     Opiate Screen Positive     THC, Screen, Urine Negative     Methadone Screen, Urine Negative     Oxycodone Screen, Urine Negative    Narrative:      Negative Thresholds For Drugs Screened:     Amphetamines               500 ng/ml   Barbiturates               200 ng/ml   Benzodiazepines            100 ng/ml   Cocaine                    300 ng/ml   Methadone                  300 ng/ml   Opiates                    300 ng/ml   Oxycodone                  100 ng/ml   THC                        50 ng/ml    The Normal Value for all drugs tested is negative. This report includes final unconfirmed screening results to be used for medical treatment purposes only. Unconfirmed results must not be used for non-medical purposes such as employment or legal testing. Clinical consideration should be applied to any drug of abuse test, particulary when unconfirmed results are used.    Salicylate Level [[id number]]  (Normal) Collected: 01/08/21 0134    Specimen: Blood Updated: 01/08/21 0219     Salicylate 1.8 mg/dL     Narrative:      Therapeutic range for Salicylates:  3.0 - 10.0 mg/dL for  antipyretic/analgesic conditions  15.0 - 30.0 mg/dL for anti-inflammatory conditions    CBC Auto Differential [[id number]]  (Abnormal) Collected: 01/08/21 0134    Specimen: Blood Updated: 01/08/21 0150     WBC 6.38 10*3/mm3      RBC 3.65 10*6/mm3      Hemoglobin 12.2 g/dL      Hematocrit 34.3 %      MCV 94.0 fL      MCH 33.4 pg      MCHC 35.6 g/dL      RDW 12.1 %      RDW-SD 41.9 fl      MPV 10.1 fL      Platelets 281 10*3/mm3      Neutrophil % 60.1 %      Lymphocyte % 26.3 %      Monocyte % 11.1 %      Eosinophil % 1.6 %      Basophil % 0.3 %      Immature Grans % 0.6 %      Neutrophils, Absolute 3.83 10*3/mm3      Lymphocytes, Absolute 1.68 10*3/mm3      Monocytes, Absolute 0.71 10*3/mm3      Eosinophils, Absolute 0.10 10*3/mm3      Basophils, Absolute 0.02 10*3/mm3      Immature Grans, Absolute 0.04 10*3/mm3      nRBC 0.0 /100 WBC     COVID PRE-OP / PRE-PROCEDURE SCREENING ORDER (NO ISOLATION) - Swab, Nasopharynx [[id number]] Collected: 01/08/21 0404    Specimen: Swab from Nasopharynx Updated: 01/08/21 0416    Narrative:      The following orders were created for panel order COVID PRE-OP / PRE-PROCEDURE SCREENING ORDER (NO ISOLATION) - Swab, Nasopharynx.  Procedure                               Abnormality         Status                     ---------                               -----------         ------                     COVID-19,APTIMA PANTHER,...[[id number]]                      In process                   Please view results for these tests on the individual orders.    COVID-19,APTIMA PANTHERRICHARDU IN-HOUSE, NP/OP SWAB IN UTM/VTM/SALINE TRANSPORT MEDIA,24 HR TAT - Swab, Nasopharynx [[id number]] Collected: 01/08/21 0404    Specimen: Swab from Nasopharynx Updated: 01/08/21 0416          I ordered the above labs and reviewed the results    RADIOLOGY  CT Head Without Contrast   Final Result     No acute hemorrhage, hydrocephalus, or mass effect.            Electronically signed by Padmaja Yuen MD on 01-08-21 at  0231           I ordered the above noted radiological studies. Interpreted by radiologist.  Reviewed by me in PACS.       PROCEDURES  Critical Care  Performed by: Ld Rodríguez MD  Authorized by: Ld Rodríguez MD     Critical care provider statement:     Critical care time (minutes):  30    Critical care time was exclusive of:  Separately billable procedures and treating other patients    Critical care was necessary to treat or prevent imminent or life-threatening deterioration of the following conditions:  Metabolic crisis    Critical care was time spent personally by me on the following activities:  Blood draw for specimens, development of treatment plan with patient or surrogate, discussions with consultants, evaluation of patient's response to treatment, examination of patient, obtaining history from patient or surrogate, ordering and performing treatments and interventions, ordering and review of laboratory studies, ordering and review of radiographic studies and re-evaluation of patient's condition          PROGRESS AND CONSULTS     The patient was wearing a facemask upon entrance into the room and remained in such throughout their visit.  I was wearing PPE including a facemask, eye protection, as well as gloves at any point entering the room and throughout the visit    0230  There are some significant lab abnormalities, most notably the patient's severe hyponatremia as well as hypokalemia.  We will gently hydrate and replete the patient's potassium.  The patient will certainly require admission to the hospital for further management and treatment.    0300  Upon reevaluation, the patient is currently resting comfortably with stable vital signs and no evidence of acute seizure activity despite the quite low potassium.  I have informed the patient that she will require admission to the hospital for electrolyte repletion.  The patient agrees with the plan and all questions have been  answered.    0345  The patient continues to be resting comfortably in the room with stable vital signs.  She has received her potassium orally and we are gently hydrating her with normal saline.  Awaiting admission.    0440  Case discussed with Dr. Tyler, who agrees to admit the patient to the hospital for further management.      MEDICAL DECISION MAKING  Results were reviewed/discussed with the patient and they were also made aware of online access. Pt also made aware that some labs, such as cultures, will not be resulted during ER visit and follow up with PMD is necessary.     MDM  Number of Diagnoses or Management Options  Alcoholic intoxication with complication (CMS/HCC):   Hypokalemia:   Hyponatremia:      Amount and/or Complexity of Data Reviewed  Clinical lab tests: ordered and reviewed  Tests in the radiology section of CPT®: ordered and reviewed  Tests in the medicine section of CPT®: ordered and reviewed  Review and summarize past medical records: yes (Upon medical records review, the patient was last seen and evaluated on 4/3/2018 secondary to a superficial venous thrombus of the right lower extremity)  Discuss the patient with other providers: yes (Dr. Tyler, who will admit the patient to the hospital)  Independent visualization of images, tracings, or specimens: yes (Unremarkable head CT)           DIAGNOSIS  Final diagnoses:   Hyponatremia   Hypokalemia   Alcoholic intoxication with complication (CMS/HCC)   Opiate abuse, continuous (CMS/HCC)       DISPOSITION  ADMISSION    Discussed treatment plan and reason for admission with pt/family and admitting physician.  Pt/family voiced understanding of the plan for admission for further testing/treatment as needed.         Latest Documented Vital Signs:  As of 05:35 EST  BP- 109/67 HR- 82 Temp- 97.2 °F (36.2 °C) (Tympanic) O2 sat- 95%         Ld Rodríguez MD  01/08/21 5435     Hyperglycemia    Hyperglycemia occurs when the glucose (a sugar) level in your blood is too high. Symptoms include increased urination, increased thirst, a dry mouth, or changes in appetite. If started on a medication, take exactly as prescribed by your health care professional. Maintain a healthy lifestyle and follow up with your primary care physician.    SEEK IMMEDIATE MEDICAL CARE IF YOU HAVE THE FOLLOWING SYMPTOMS: shortness of breath, change in mental status, nausea or vomiting, fruity smell to your breath, or any signs of dehydration.

## 2021-01-15 ENCOUNTER — RX RENEWAL (OUTPATIENT)
Age: 51
End: 2021-01-15

## 2021-01-23 ENCOUNTER — RX RENEWAL (OUTPATIENT)
Age: 51
End: 2021-01-23

## 2021-01-27 ENCOUNTER — EMERGENCY (EMERGENCY)
Facility: HOSPITAL | Age: 51
LOS: 0 days | Discharge: HOME | End: 2021-01-27
Attending: STUDENT IN AN ORGANIZED HEALTH CARE EDUCATION/TRAINING PROGRAM | Admitting: STUDENT IN AN ORGANIZED HEALTH CARE EDUCATION/TRAINING PROGRAM
Payer: MEDICARE

## 2021-01-27 VITALS
RESPIRATION RATE: 16 BRPM | HEIGHT: 63 IN | SYSTOLIC BLOOD PRESSURE: 127 MMHG | OXYGEN SATURATION: 99 % | DIASTOLIC BLOOD PRESSURE: 73 MMHG | TEMPERATURE: 96 F | HEART RATE: 113 BPM

## 2021-01-27 VITALS — HEART RATE: 98 BPM

## 2021-01-27 DIAGNOSIS — Z79.899 OTHER LONG TERM (CURRENT) DRUG THERAPY: ICD-10-CM

## 2021-01-27 DIAGNOSIS — E78.5 HYPERLIPIDEMIA, UNSPECIFIED: ICD-10-CM

## 2021-01-27 DIAGNOSIS — Z79.4 LONG TERM (CURRENT) USE OF INSULIN: ICD-10-CM

## 2021-01-27 DIAGNOSIS — F32.9 MAJOR DEPRESSIVE DISORDER, SINGLE EPISODE, UNSPECIFIED: ICD-10-CM

## 2021-01-27 DIAGNOSIS — T73.0XXA STARVATION, INITIAL ENCOUNTER: ICD-10-CM

## 2021-01-27 DIAGNOSIS — E11.9 TYPE 2 DIABETES MELLITUS WITHOUT COMPLICATIONS: ICD-10-CM
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
## 2021-01-27 DIAGNOSIS — J45.909 UNSPECIFIED ASTHMA, UNCOMPLICATED: ICD-10-CM

## 2021-01-27 DIAGNOSIS — I10 ESSENTIAL (PRIMARY) HYPERTENSION: ICD-10-CM

## 2021-01-27 DIAGNOSIS — F25.9 SCHIZOAFFECTIVE DISORDER, UNSPECIFIED: ICD-10-CM
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
## 2021-01-27 PROCEDURE — 99282 EMERGENCY DEPT VISIT SF MDM: CPT

## 2021-01-27 NOTE — ED PROVIDER NOTE - NS ED ROS FT
Constitutional: See HPI.  Eyes: No visual changes, eye pain or discharge.   ENMT: No hearing changes, pain, discharge or infections. No neck pain or stiffness. No limited ROM  Cardiac: No SOB or edema. No chest pain with exertion.  Respiratory: No cough or respiratory distress. No hemoptysis.   GI: No nausea, vomiting, diarrhea or abdominal pain.  : No dysuria, frequency or burning. No Discharge  MS: No myalgia, muscle weakness, joint pain or back pain.  Neuro: No headache or weakness. No LOC.  Skin: No skin rash.  PSYCH: denies SI/HI, no hallucinations  Except as documented in the HPI, all other systems are negative.

## 2021-01-27 NOTE — ED PROVIDER NOTE - CLINICAL SUMMARY MEDICAL DECISION MAKING FREE TEXT BOX
50 year old female with a pmh of DM HTN HLD Asthma & Schizoaffective disorder presents here c.o feeling hungry. Patient states she has been sad for the past several weeks and follows with a physciatrist. Patient denied any SI/HI requesting food. Patient was fed and stated she felt better.  Patient has proper follow up. Return precautions given.

## 2021-01-27 NOTE — ED PROVIDER NOTE - PATIENT PORTAL LINK FT
You can access the FollowMyHealth Patient Portal offered by [facility] by registering at the following website: [email or web address]. By joining [employer]’s FollowMyHealth portal, you will also be able to view your health information using other applications (apps) compatible with our system.

## 2021-01-27 NOTE — ED PROVIDER NOTE - PHYSICAL EXAMINATION
CONST: Well appearing in NAD  EYES: Sclera and conjunctiva clear.  NECK: Non-tender, no meningeal signs, supple  CARD: Normal S1 S2; Normal rate and rhythm  RESP: Equal BS B/L, No wheezes, rhonchi or rales. No distress  GI: Soft, non-tender, non-distended.  MS: Normal ROM in all extremities. No edema of lower extremities, no calf pain, radial pulses 2+ bilaterally  SKIN: Warm, dry, no acute rashes. Good turgor  NEURO: A&Ox3, No focal deficits. Strength 5/5 with no sensory deficits. Steady gait  PSYCH: Denies Si/HI, cooperative, good insight.

## 2021-01-27 NOTE — ED PROVIDER NOTE - NSFOLLOWUPINSTRUCTIONS_ED_ALL_ED_FT
Medical Screening Exam  A medical screening exam helps determine whether or not you need emergency medical treatment.    During the medical screening exam, a health care provider does a short physical exam and medical history to assess:    Your current symptoms.  Your overall health.    Depending on your symptoms, you may need additional tests.    What are the possible outcomes of a medical screening exam?  Your medical screening exam may determine that:    You do not need emergency treatment at this time.  You need treatment right away.  You need to be transferred to another medical center.    When should I seek medical care?  If you have a regular health care provider, make an appointment for a follow-up visit with him or her. If you do not have a regular health care provider, ask about resources in your community.    Get help right away if:  Your condition may change over time. If your condition gets worse or you develop new or troubling symptoms before you see your health care provider, go to an emergency department right away.    In an emergency:     Call 911 or have someone drive you to the nearest hospital.  Do not drive yourself.  This information is not intended to replace advice given to you by your health care provider. Make sure you discuss any questions you have with your health care provider.    Follow up with your primary medical doctor in 1-2 days

## 2021-01-27 NOTE — ED PROVIDER NOTE - NSFOLLOWUPCLINICS_GEN_ALL_ED_FT
[facility]  [facility]  [address] [place] [id number]  Phone: [phone]  Fax:   Follow Up Time: 1-3 Days

## 2021-01-27 NOTE — ED PROVIDER NOTE - OBJECTIVE STATEMENT
50 y.o female w/ hx of Dm, HTN, HLD, depression, asthma, schizoaffective presents to the ED for evaluation of hunger.  States she is hungry, requesting something to eat.  States she was sent here because she would not take her pills or come out to eat.  Willing to eat now and take [facility]  Denies depression, SI/HI, hallucinations, chest pain, dyspnea, abd pain, recent trauma or falls.

## 2021-01-27 NOTE — ED PROVIDER NOTE - PROGRESS NOTE DETAILS
Attending Note: 51 y/o F PMHx DM, HTN, HLD, asthma, schizoaffective disorder, from [facility] sent here for depression. States she has been depressed for a long time and has lost a lot family members. No thoughts of hurting herself or anyone else. No AVH. Denies any other sx except that she is hungry. Exam: CONSTITUTIONAL: WA / WN / NAD. HEAD: NCAT. EYES: PERRL; EOMI; anicteric. ENT: Normal pharynx; mucous membranes pink/moist, no erythema. NECK: Supple; no meningeal signs CARD: RRR; nl S1/S2; no M/R/G. Pulses equal bilaterally. RESP: Respiratory rate and effort are normal; breath sounds clear and equal bilaterally. ABD: Soft, NT ND nl bowel sounds; no masses; no rebound. MSK/EXT: No gross deformities; full range of motion. SKIN: Warm and dry;  NEURO: AAOx3, PSYCH: Memory Intact, Normal Affect. Discussed case w/ staff.  states refusing to leave room, take meds or eat.  eating in ED, pt states she will take meds when she gets back and socialize.  staff agreeable w/ plan. Discussed results with pt.  All questions were answered and return precautions discussed.  Pt is asx and comfortable at this time.  Unremarkable re-exam.  No further concerns at this time from pt.  Will follow up with PMD and psych.  Pt understands and agrees with tx plan.

## 2021-01-27 NOTE — ED PROVIDER NOTE - ATTENDING CONTRIBUTION TO CARE
I personally evaluated the patient. I reviewed the Resident’s or Physician Assistant’s note (as assigned above), and agree with the findings and plan except as documented in my note.      see progress note

## 2021-01-29 ENCOUNTER — RX RENEWAL (OUTPATIENT)
Age: 51
End: 2021-01-29

## 2021-01-29 RX ORDER — ATORVASTATIN CALCIUM 40 MG/1
40 TABLET, FILM COATED ORAL
Qty: 90 | Refills: 3 | Status: ACTIVE | COMMUNITY
Start: 2019-01-30 | End: 1900-01-01

## 2021-02-03 ENCOUNTER — NON-APPOINTMENT (OUTPATIENT)
Age: 51
End: 2021-02-03

## 2021-03-04 ENCOUNTER — EMERGENCY (EMERGENCY)
Facility: HOSPITAL | Age: 51
LOS: 0 days | Discharge: HOME | End: 2021-03-05
Attending: EMERGENCY MEDICINE | Admitting: EMERGENCY MEDICINE
Payer: MEDICARE

## 2021-03-04 VITALS
HEART RATE: 105 BPM | TEMPERATURE: 98 F | WEIGHT: 199.96 LBS | SYSTOLIC BLOOD PRESSURE: 151 MMHG | DIASTOLIC BLOOD PRESSURE: 77 MMHG | RESPIRATION RATE: 18 BRPM | OXYGEN SATURATION: 97 % | HEIGHT: 63 IN

## 2021-03-04 DIAGNOSIS — Z20.822 CONTACT WITH AND (SUSPECTED) EXPOSURE TO COVID-19: ICD-10-CM

## 2021-03-04 DIAGNOSIS — F20.9 SCHIZOPHRENIA, UNSPECIFIED: ICD-10-CM

## 2021-03-04 DIAGNOSIS — Z88.0 ALLERGY STATUS TO PENICILLIN: ICD-10-CM

## 2021-03-04 DIAGNOSIS — F32.9 MAJOR DEPRESSIVE DISORDER, SINGLE EPISODE, UNSPECIFIED: ICD-10-CM

## 2021-03-04 LAB
ANION GAP SERPL CALC-SCNC: 15 MMOL/L — HIGH (ref 7–14)
APAP SERPL-MCNC: <5 UG/ML — LOW (ref 10–30)
APPEARANCE UR: ABNORMAL
BACTERIA # UR AUTO: ABNORMAL
BASOPHILS # BLD AUTO: 0.06 K/UL — SIGNIFICANT CHANGE UP (ref 0–0.2)
BASOPHILS NFR BLD AUTO: 0.6 % — SIGNIFICANT CHANGE UP (ref 0–1)
BILIRUB UR-MCNC: NEGATIVE — SIGNIFICANT CHANGE UP
BUN SERPL-MCNC: 7 MG/DL — LOW (ref 10–20)
CALCIUM SERPL-MCNC: 9.8 MG/DL — SIGNIFICANT CHANGE UP (ref 8.5–10.1)
CHLORIDE SERPL-SCNC: 98 MMOL/L — SIGNIFICANT CHANGE UP (ref 98–110)
CO2 SERPL-SCNC: 21 MMOL/L — SIGNIFICANT CHANGE UP (ref 17–32)
COLOR SPEC: YELLOW — SIGNIFICANT CHANGE UP
CREAT SERPL-MCNC: 0.9 MG/DL — SIGNIFICANT CHANGE UP (ref 0.7–1.5)
DIFF PNL FLD: NEGATIVE — SIGNIFICANT CHANGE UP
EOSINOPHIL # BLD AUTO: 0.08 K/UL — SIGNIFICANT CHANGE UP (ref 0–0.7)
EOSINOPHIL NFR BLD AUTO: 0.9 % — SIGNIFICANT CHANGE UP (ref 0–8)
EPI CELLS # UR: 9 /HPF — HIGH (ref 0–5)
GLUCOSE SERPL-MCNC: 112 MG/DL — HIGH (ref 70–99)
GLUCOSE UR QL: NEGATIVE — SIGNIFICANT CHANGE UP
HCG SERPL-ACNC: 0.6 MIU/ML — SIGNIFICANT CHANGE UP
HCT VFR BLD CALC: 43.8 % — SIGNIFICANT CHANGE UP (ref 37–47)
HGB BLD-MCNC: 14.7 G/DL — SIGNIFICANT CHANGE UP (ref 12–16)
HYALINE CASTS # UR AUTO: 24 /LPF — HIGH (ref 0–7)
IMM GRANULOCYTES NFR BLD AUTO: 0.3 % — SIGNIFICANT CHANGE UP (ref 0.1–0.3)
KETONES UR-MCNC: ABNORMAL
LEUKOCYTE ESTERASE UR-ACNC: ABNORMAL
LYMPHOCYTES # BLD AUTO: 2.45 K/UL — SIGNIFICANT CHANGE UP (ref 1.2–3.4)
LYMPHOCYTES # BLD AUTO: 26 % — SIGNIFICANT CHANGE UP (ref 20.5–51.1)
MCHC RBC-ENTMCNC: 28.5 PG — SIGNIFICANT CHANGE UP (ref 27–31)
MCHC RBC-ENTMCNC: 33.6 G/DL — SIGNIFICANT CHANGE UP (ref 32–37)
MCV RBC AUTO: 84.9 FL — SIGNIFICANT CHANGE UP (ref 81–99)
MONOCYTES # BLD AUTO: 0.82 K/UL — HIGH (ref 0.1–0.6)
MONOCYTES NFR BLD AUTO: 8.7 % — SIGNIFICANT CHANGE UP (ref 1.7–9.3)
NEUTROPHILS # BLD AUTO: 5.97 K/UL — SIGNIFICANT CHANGE UP (ref 1.4–6.5)
NEUTROPHILS NFR BLD AUTO: 63.5 % — SIGNIFICANT CHANGE UP (ref 42.2–75.2)
NITRITE UR-MCNC: NEGATIVE — SIGNIFICANT CHANGE UP
NRBC # BLD: 0 /100 WBCS — SIGNIFICANT CHANGE UP (ref 0–0)
PH UR: 6 — SIGNIFICANT CHANGE UP (ref 5–8)
PLATELET # BLD AUTO: 356 K/UL — SIGNIFICANT CHANGE UP (ref 130–400)
POTASSIUM SERPL-MCNC: 3.5 MMOL/L — SIGNIFICANT CHANGE UP (ref 3.5–5)
POTASSIUM SERPL-SCNC: 3.5 MMOL/L — SIGNIFICANT CHANGE UP (ref 3.5–5)
PROT UR-MCNC: ABNORMAL
RBC # BLD: 5.16 M/UL — SIGNIFICANT CHANGE UP (ref 4.2–5.4)
RBC # FLD: 15 % — HIGH (ref 11.5–14.5)
RBC CASTS # UR COMP ASSIST: 7 /HPF — HIGH (ref 0–4)
SALICYLATES SERPL-MCNC: <0.3 MG/DL — LOW (ref 4–30)
SARS-COV-2 RNA SPEC QL NAA+PROBE: DETECTED
SODIUM SERPL-SCNC: 134 MMOL/L — LOW (ref 135–146)
SP GR SPEC: 1.03 — SIGNIFICANT CHANGE UP (ref 1.01–1.03)
UROBILINOGEN FLD QL: ABNORMAL
WBC # BLD: 9.41 K/UL — SIGNIFICANT CHANGE UP (ref 4.8–10.8)
WBC # FLD AUTO: 9.41 K/UL — SIGNIFICANT CHANGE UP (ref 4.8–10.8)
WBC UR QL: 15 /HPF — HIGH (ref 0–5)

## 2021-03-04 PROCEDURE — 99285 EMERGENCY DEPT VISIT HI MDM: CPT | Mod: CS

## 2021-03-04 PROCEDURE — 93010 ELECTROCARDIOGRAM REPORT: CPT

## 2021-03-04 PROCEDURE — 90792 PSYCH DIAG EVAL W/MED SRVCS: CPT

## 2021-03-04 RX ORDER — HYDROXYZINE HCL 10 MG
50 TABLET ORAL EVERY 6 HOURS
Refills: 0 | Status: DISCONTINUED | OUTPATIENT
Start: 2021-03-04 | End: 2021-03-05

## 2021-03-04 RX ORDER — HALOPERIDOL DECANOATE 100 MG/ML
5 INJECTION INTRAMUSCULAR ONCE
Refills: 0 | Status: COMPLETED | OUTPATIENT
Start: 2021-03-04 | End: 2021-03-04

## 2021-03-04 RX ORDER — CLOZAPINE 150 MG/1
12.5 TABLET, ORALLY DISINTEGRATING ORAL
Refills: 0 | Status: DISCONTINUED | OUTPATIENT
Start: 2021-03-04 | End: 2021-03-05

## 2021-03-04 RX ADMIN — HALOPERIDOL DECANOATE 5 MILLIGRAM(S): 100 INJECTION INTRAMUSCULAR at 16:55

## 2021-03-04 RX ADMIN — Medication 2 MILLIGRAM(S): at 18:20

## 2021-03-04 NOTE — ED PROVIDER NOTE - OBJECTIVE STATEMENT
50 y.o. female with a PMH of depression presented to the ER from [address] [place] [address] for "feeling sad."  Pt denies medications.  According to triage pt stopped taking her meds a month ago.  Pt denies suicidal ideation, homicidal ideation, hallucinations.  Pt states that she's been sad about family members that have passed away.  No other complaints.
denies pain/discomfort

## 2021-03-04 NOTE — ED BEHAVIORAL HEALTH ASSESSMENT NOTE - CASE SUMMARY
Pt is a 49 yo CF with domiciled at [facility], history of Schizophrenia with several IPP admissions, currently sent by her residence for decompensation of her psychiatric symptoms in the context of non compliance with her medications and outpatient treatment.            On evaluation pt endorses feeling depressed and also admits to having AH. She does not appear overtly psychotic but has poverty of speech and incongruent mood at times. As per collateral pt has not been compliant with her medications (psychiatric as well as medical), has not been taking care of her ADLs, defecating on the floor, and not following up with outpatient treatment. In the past pt has pt has some aggression when she decompensates but it has not happened yet this time. Currently pt at risk for further decompensation with out reinitiating her medications.  Patient would benefit from IPP admission for safety and stabilization by reinitiating Clozapine in a monitored setting. Will admit pt to IPP pending COVID results and bed availability.

## 2021-03-04 NOTE — CHART NOTE - NSCHARTNOTEFT_GEN_A_CORE
Pt admitted to ED from [facility] with plan to transfer to [facility] for IPP; however, pt tested COVID+ and now requires transfer to [facility]. [facility] discussed case with BELIA Park. SW contacted [facility] ([phone]) regarding transfer. Spoke with Admissions liaison Cesilia who noted she is already aware of transfer and ED MD is providing required documentation. Per Cesilia, pt can be transferred once case is reviewed and all necessary info is received; pt has Medicare/Medicaid and will not require insurance auth. ED will need to arrange transport to [facility]. [facility] notified BELIA Park of above.     Pending transfer acceptance from [facility].
MARTHA received a call from Cesilia from [facility] admissions requesting clarification on when pt initially tested COVID positive to determine unit placement. BELIA Park made aware.

## 2021-03-04 NOTE — ED ADULT NURSE NOTE - OBJECTIVE STATEMENT
Patient presents to ED from [facility] for feeling sad, patient states she has hx of depressing but does not take her medication anymore. Patient states she is sad about the passing of some of her family members. Denies any suicidal or homicidal ideations, denies any chest pain or SOB, denies any illicit drug use

## 2021-03-04 NOTE — ED BEHAVIORAL HEALTH ASSESSMENT NOTE - SUMMARY
The patient is a 49yo  single F, domiciled alone at [facility], unemployed/disabled, with no dependents, with PMH of Insulin-dependent DM, HLD, HTN, and asthma, and PPH of Schizophrenia (with multiple IPPs including SBPC, last IPP at [facility] was 8/13/19-9/419), who was brought in to the ED by ambulance referred by her residence for worsening disorganized behavior in the context of recent medication noncompliance. Psychiatry was consulted to perform a mental health evaluation.    On evaluation, patient presents with thought blocking and poorly related. According to collateral, patient has had recent decompensation from baseline in the context of medication noncompliance. She has been exhibiting primarily negative symptoms, and has not been able to take care of herself 2/2 her psychiatric decompensation. Patient warrants IPP admission for reinitiation of medications and stabilization.    - Admit under 9.39 legals to IPP; pending COVID, antibody tests and bed availability  - Restart Clozapine at 12.5mg BID (will continue to titrate once on IPP)  - Atarax 50mg Q6 PRN for insomnia/anxiety  - If patient becomes agitated and not responding to verbal redirection, may give Haldol 5mg PO, Ativan 2mg PO, and Benadryl 50mg PO Q6 PRN with escalation to IM formulation should the patient refuse/become an acute danger to herself/others with EKG to follow.  - Restart medical medications:  Atorvastatin 40mg at 9pm, Insulin Glargine 20U at 5pm, FS and ISS, Colace 100mg at bedtime

## 2021-03-04 NOTE — ED BEHAVIORAL HEALTH ASSESSMENT NOTE - RISK ASSESSMENT
Moderate Acute Suicide Risk Modifiable risk factors include psychosis, psychiatric illness, noncompliance, recent psychosocial stressors, single, unemployment.  Static risk factors include  race.  Protective factors include social support, residential stability.

## 2021-03-04 NOTE — ED ADULT TRIAGE NOTE - CHIEF COMPLAINT QUOTE
BIBA from [address] [place] [address], refusing medication x 1 month, c/o depression, denies SI/HI. pt is cooperative/non violent. states she is hearing voices but they are saying pleasant things

## 2021-03-04 NOTE — ED ADULT NURSE NOTE - CHIEF COMPLAINT QUOTE
BIBA from [address] [place] [address], refusing medication x 1 month, c/o depression, denies SI/HI. pt is cooperative/non violent. states she is hearing voices but they are saying pleasant things

## 2021-03-04 NOTE — ED BEHAVIORAL HEALTH ASSESSMENT NOTE - DESCRIPTION
Patient grew up with family in , has a brother and sister. She has lived at [facility] for years now. Patient has been calm and in good behavioral control in the ED. 1:1 sit with patient. PCA reports that patient had one outburst of laughter randomly at one point, but otherwise nothing to report. HLD, HTN, DM, asthma

## 2021-03-04 NOTE — ED PROVIDER NOTE - GASTROINTESTINAL NEGATIVE STATEMENT, MLM
no abdominal pain, no bloating, no constipation, no diarrhea, no nausea and no vomiting.
secondary impairments/risk factors
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
## 2021-03-04 NOTE — ED BEHAVIORAL HEALTH ASSESSMENT NOTE - CURRENT MEDICATION
Medication packet from group home:  Atorvastatin 40mg at 9pm, Insulin Glargine 20U at 5pm, Colace 100mg at bedtime, Pioglitazone HCL 30mg at 9pm, Fluphenazine Decanoate 125mg injection biweekly recently changed to oral as patient had missed last injection  (last received 1/23/21) > 10mg BID and 5mg BID as per [facility]; Fingersticks QD on M, W, F; Insulin Glargine 20mg at 5pm, Linagliptin 5mg daily, Zoloft 100mg and 50mg at 9pm, Benadryl 25mg QHS, Clozaril 500mg at 9pm, Artane 2mg at 9pm, Benadryl 25mg at 9pm, Jardiance 10mg at 9pm, artificial tears 1/4% Q8 both eyes 1 drop, Melatonin 10mg at bedtime. Medication packet from group home:  Atorvastatin 40mg at 9pm, Insulin Glargine 20U at 5pm, Colace 100mg at bedtime, Pioglitazone HCL 30mg at 9pm, Fluphenazine Decanoate 125mg injection biweekly recently changed to oral as patient had missed last injection  (last received 1/23/21) > 10mg BID and 5mg BID as per [facility]; Fingersticks QD on M, W, F; Linagliptin 5mg daily, Zoloft 100mg and 50mg at 9pm, Benadryl 25mg QHS, Clozaril 500mg at 9pm, Artane 2mg at 9pm, Benadryl 25mg at 9pm, Jardiance 10mg at 9pm, artificial tears 1/4% Q8 both eyes 1 drop, Melatonin 10mg at bedtime.

## 2021-03-04 NOTE — ED PROVIDER NOTE - PROGRESS NOTE DETAILS
spoke to psychiatry, will see pt spoke to psych, will admit pt, orders written for Vistaril 50mg po q6h prn agitation or insomnia per psych and Clozaril 12.5 mg BID x 3 doses while pt in ED Plan is admission to psych. However, pt refuses labwork and refuses to change into gown. I spoke with her at length regarding need for her to changed, and she continues to show poor insight into the issue. I told her I would have to sedate her in order to do her labwork and change, but she continued to refuse. Haldol IM was given, will place in room, reassess. endorsed to Dr Cates to f/u labs, admit IPP. pt uncooperative and anxious, continues to refuse blood draw, Haldol already given, order for Ativan IM written Covid (+) called psych *[phone], pt should get admitted to [facility], psych will call back with directive spoke to [facility], EKG being reviewed, await callback from NP received call from medical attending at [facility] [place], need labs for medical clearance, pt will not be accepted for transfer without labs spoke to [facility] [phone] Intake, EKG being reviewed, await callback from NP received signout from Ronnie Park; pt to be transferred to [facility] due to covid +status; labs sent; currently pending prior to transfer;

## 2021-03-04 NOTE — ED PROVIDER NOTE - ATTENDING CONTRIBUTION TO CARE
49yo woman h/o depression, lives at [address] [place] [address] presented for "sadness.: Denies taking her medications, denies SI/HI. VS, exam as noted, pt with flat affect and poor insight, can not explain why she came to the ED or why she is not taking medications. Will consult psych.

## 2021-03-04 NOTE — ED BEHAVIORAL HEALTH ASSESSMENT NOTE - OTHER PAST PSYCHIATRIC HISTORY (INCLUDE DETAILS REGARDING ONSET, COURSE OF ILLNESS, INPATIENT/OUTPATIENT TREATMENT)
Dx - Schizophrenia, diagnosed over 20yrs ago. Pt has been in and out of [facility] for the past 15 years. Pt's sister reports that pt was transferred to independent living in Jan 2019.  OPD - at [facility] with NP Roz ([phone]). Regi, therapist ([phone]).  IPP - 1st psych admission in early 20s; most recent at [facility] 8/13/19-9/4/19).  No history of SA.

## 2021-03-05 VITALS
DIASTOLIC BLOOD PRESSURE: 68 MMHG | TEMPERATURE: 97 F | OXYGEN SATURATION: 99 % | SYSTOLIC BLOOD PRESSURE: 118 MMHG | HEART RATE: 96 BPM | RESPIRATION RATE: 18 BRPM

## 2021-03-05 LAB
AMPHET UR-MCNC: NEGATIVE — SIGNIFICANT CHANGE UP
BARBITURATES UR SCN-MCNC: NEGATIVE — SIGNIFICANT CHANGE UP
BENZODIAZ UR-MCNC: NEGATIVE — SIGNIFICANT CHANGE UP
COCAINE METAB.OTHER UR-MCNC: NEGATIVE — SIGNIFICANT CHANGE UP
DRUG SCREEN 1, URINE RESULT: SIGNIFICANT CHANGE UP
METHADONE UR-MCNC: NEGATIVE — SIGNIFICANT CHANGE UP
OPIATES UR-MCNC: NEGATIVE — SIGNIFICANT CHANGE UP
PCP UR-MCNC: NEGATIVE — SIGNIFICANT CHANGE UP
PROPOXYPHENE QUALITATIVE URINE RESULT: NEGATIVE — SIGNIFICANT CHANGE UP
SARS-COV-2 IGG SERPL QL IA: POSITIVE
SARS-COV-2 IGM SERPL IA-ACNC: 130 INDEX — HIGH
THC UR QL: NEGATIVE — SIGNIFICANT CHANGE UP

## 2021-03-17 ENCOUNTER — RX RENEWAL (OUTPATIENT)
Age: 51
End: 2021-03-17

## 2021-03-28 ENCOUNTER — RX RENEWAL (OUTPATIENT)
Age: 51
End: 2021-03-28

## 2021-04-29 ENCOUNTER — APPOINTMENT (OUTPATIENT)
Dept: INTERNAL MEDICINE | Facility: CLINIC | Age: 51
End: 2021-04-29

## 2021-04-29 ENCOUNTER — RX RENEWAL (OUTPATIENT)
Age: 51
End: 2021-04-29

## 2021-04-29 RX ORDER — INSULIN GLARGINE 300 U/ML
300 INJECTION, SOLUTION SUBCUTANEOUS DAILY
Qty: 3 | Refills: 0 | Status: ACTIVE | COMMUNITY
Start: 2018-01-30 | End: 1900-01-01

## 2021-04-29 RX ORDER — PIOGLITAZONE HYDROCHLORIDE 30 MG/1
30 TABLET ORAL DAILY
Qty: 90 | Refills: 0 | Status: ACTIVE | COMMUNITY
Start: 2018-07-11 | End: 1900-01-01

## 2021-06-25 NOTE — ED ADULT TRIAGE NOTE - NS_BH TRG QUESTION4_ED_ALL_ED
Mom contacted to follow up on symptoms   Concerns about fever   Onset x 2 days   Tmax 101   Yesterday, temp at 100.3   This morning temp at 98    Temporal temps being checked   Mom giving tylenol.      No nasal congestion   No cough   No rash   No ear tuggi No

## 2021-08-04 NOTE — PATIENT PROFILE BEHAVIORAL HEALTH - PRO ANTICIPATED DISCH DISP
Physical Therapy     Referred by: Kush Headley MD; Medical Diagnosis (from order):    Diagnosis Information      Diagnosis    721.0 (ICD-9-CM) - M47.812 (ICD-10-CM) - Cervical spondylosis without myelopathy    729.1 (ICD-9-CM) - M79.18 (ICD-10-CM) - Cervical myofascial pain syndrome                Progress Note    Visit:  7   Diagnosis Precautions: Chiari I malformation   Yellow Flags: anxiety and greater than 3 months of constant pain  Patient alert and oriented X3.    SUBJECTIVE                                                                                                             Patient reports that she started getting pain yesterday afternoon and it worsened this morning. Patient reports pain on the right with difficulty turning to the right.    Functional Change: Is tez to look down and do a little gardening without much concern patient reports is about 80% improved since starting therapy.   Current functional limitations: Turning head to the right   Pain / Symptoms:  Pain rating (out of 10): Current: 2     OBJECTIVE                                                                                                                     Range of Motion (ROM)   (degrees unless noted; active unless noted; norms in ( ); negative=lacking to 0, positive=beyond 0)   Lumbar:    - Flexion (60-80):  pain     - Rotation (30-45):        • Right:  45°  pain        Outcome Measures:   OSWESTRY Total Scored: 8  OSWESTRY Total Possible Score: 50  OSWESTRY Score Calculated: 16 %  (0-20% = minimal disability; 20-40% = moderate disability; 40-60% = severe disability; 60-80% = crippled; % = bed bound) see flowsheet for additional documentation    TREATMENT                                                                                                                  Therapeutic Exercise:  - cervical rotations right x10 supine  - supine cervical chin tucks x10  - supine scap squeeze x10   Verbal Cues/Manual Cues  [facility].[employer]/  and less pain allowing for increased functional turning of head. Recent patient has increased pain with increased muscle tension limiting rotation to the right. After treatment this visit patient really didn't notice muscle change and pain after treatment. Patient would benefit from continued therapy to improve cervical right rotation     To date the patient has made gains as expected as reported. Patient continues to have impairments and functional deficits as noted.  Patient will continue to benefit from skilled care as outlined.  Patient Education:   Results of above outlined education: Verbalizes understanding and Demonstrates understanding      PLAN                                                                                                                           Updates to plan of care: extend current plan of care    Frequency / Duration: 2 times per week tapering as patient progresses  for an estimated additional 8 visits for additional 4 weeks    Suggestions for next session as indicated: Progress per plan of care continue manual therapy to improve tissue tension and facet mobility      GOALS                                                                                                                           Decrease pain/symptoms to 1/10  Improve involved ROM to within functional limits without increased pain all cervical planes  The above improvements in impairments to assist in obtaining goals listed below  Long Term Goals: to be met by end of plan of care  1. Patient will rotate head to be able to see blind spots and behind car for safe driving. Status: progressing/ongoing Recent limitation in right rotation   2. Patient will sit for 60 minutes for computer/desk work at a computer Status: met   3. Neck Disability Index: Patient will complete form to reflect an improved score to less than or equal to 15% to indicate patient reported improvement in function/disability/impairment (minimal  detectable change: 21%). Status: met   4. Patient will be independent with progressed and modified home exercise program.  Status: met       Therapy procedure time and total treatment time can be found documented on the Time Entry flowsheet   extended care facility

## 2021-11-02 NOTE — H&P ADULT - NSHPREVIEWOFSYSTEMS_GEN_ALL_CORE
Patient refusing to go to dialysis today due to a family emergency, MDA made aware. Patient signed AMA form. Constitutional: (-) fever (-) chills   Eyes: (-) visual changes  ENMT: (-) nasal or chest congestion (-) runny nose (-) sore throat (-) neck pain (-) neck stiffness  Cardiac: (-) chest pain (-) syncope  Respiratory: (-) cough (-) SOB  GI: (-) nausea (-) vomiting (-) diarrhea  : (-) incontinence  MS:(-) back pain   Neuro: (-) head injury (-) headache (-) dizziness (-) numbness/tingling to extremities B/L (-) LOC   Skin: (-) abrasion (-) rash (-) laceration  Except as documented in the HPI, all other systems are negative.

## 2021-11-10 NOTE — ED ADULT NURSE NOTE - NS ED NURSE LEVEL OF CONSCIOUSNESS SPEECH
pls call spouse; he will need to call insurance and get names of HHN available that patient can use since our [facility] is not accepting new patients. Speaking Coherently

## 2021-12-23 NOTE — PROGRESS NOTE ADULT - PROVIDER SPECIALTY LIST ADULT
Internal Medicine
18

## 2022-01-12 ENCOUNTER — RX RENEWAL (OUTPATIENT)
Age: 52
End: 2022-01-12

## 2022-01-19 ENCOUNTER — RX RENEWAL (OUTPATIENT)
Age: 52
End: 2022-01-19

## 2022-03-25 ENCOUNTER — RX RENEWAL (OUTPATIENT)
Age: 52
End: 2022-03-25

## 2022-05-22 NOTE — PROGRESS NOTE BEHAVIORAL HEALTH - NSBHPTASSESSDT_PSY_A_CORE
01-Jul-2019 13:20
02-Jun-2019 10:00
03-Jun-2019 12:51
04-Jun-2019 13:08
06-Jun-2019 09:18
08-Jul-2019 11:11
10-Bacilio-2019 10:44
11-Jun-2019 09:26
13-Jun-2019 09:34
18-Jun-2019 09:33
20-Jun-2019 11:05
24-Jun-2019 11:06
25-Jun-2019 10:47
28-Jun-2019 10:33
29-May-2019 15:51
31-May-2019 09:54
12-Jun-2019 14:13
27-Jun-2019 10:25
02-Jul-2019 10:00
17-Jun-2019 10:28
26-Jun-2019 09:54
19-Jun-2019 13:09
21-Jun-2019 13:37
03-Jul-2019 13:53
20-Jun-2019 12:43
14-Jun-2019 11:20
07-Jun-2019 09:48
01-Jun-2019 16:34
05-Jul-2019 10:33
09-Jul-2019 10:52
irritability
05-Jun-2019 10:05
30-May-2019 11:55

## 2022-05-30 ENCOUNTER — RX RENEWAL (OUTPATIENT)
Age: 52
End: 2022-05-30

## 2022-07-07 NOTE — CHART NOTE - NSCHARTNOTEFT_GEN_A_CORE
Pt. participated in this morning's treatment team meeting.  She reports she is continuing to experience auditory hallucinations and believes in God is speaking to her.  Pt.'s medication will continue to be adjusted. Pt. continues to isolate in her room and states she feels safer doing so.  She denies any suicidal or homicidal ideation.  Upon discharge, pt. will return to her residence at [facility].  In addition, she will continue outpatient treatment at [facility].  Pt. is not psychiatrically stable for discharge at this time. Yes

## 2022-08-21 NOTE — ED ADULT NURSE NOTE - OBJECTIVE STATEMENT
RESPIRATORY CARE NOTE   Patient is on 4 lpm N/C, BS diminished coarse inspiratory/ expiratory wheezes, gave Duoneb treatment x1 and a second neb 5 mg Albuterol, BS post treatment Increased aeration, coarse inspiratory/ expiratory wheezes patient perceives significant improvement, patient tolerated well.     Royer Finch, RT   Pt arrived via [facility] following non-compliance w/BGM protocols. Pt was compliant during transfer, with a BGM of 178. Pt was compliant in this facility with a BGM of 225 @~1840 following meal. Pt denies pain, in no observable distress, demonstrating appropriate behavior and compliance with care. Will continue to monitor for changes.

## 2022-10-27 NOTE — PROGRESS NOTE BEHAVIORAL HEALTH - ESTIMATED INTELLIGENCE
Admission Reconciliation is Completed  Discharge Reconciliation is Completed
Average
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
## 2022-11-23 ENCOUNTER — APPOINTMENT (OUTPATIENT)
Dept: ENDOCRINOLOGY | Facility: CLINIC | Age: 52
End: 2022-11-23

## 2022-12-04 ENCOUNTER — EMERGENCY (EMERGENCY)
Facility: HOSPITAL | Age: 52
LOS: 0 days | Discharge: HOME | End: 2022-12-04
Attending: EMERGENCY MEDICINE | Admitting: EMERGENCY MEDICINE

## 2022-12-04 VITALS
TEMPERATURE: 98 F | HEART RATE: 92 BPM | DIASTOLIC BLOOD PRESSURE: 94 MMHG | SYSTOLIC BLOOD PRESSURE: 197 MMHG | OXYGEN SATURATION: 98 %

## 2022-12-04 VITALS
RESPIRATION RATE: 18 BRPM | HEART RATE: 123 BPM | OXYGEN SATURATION: 98 % | TEMPERATURE: 98 F | SYSTOLIC BLOOD PRESSURE: 91 MMHG | DIASTOLIC BLOOD PRESSURE: 64 MMHG | WEIGHT: 210.1 LBS | HEIGHT: 62 IN

## 2022-12-04 DIAGNOSIS — Z79.4 LONG TERM (CURRENT) USE OF INSULIN: ICD-10-CM

## 2022-12-04 DIAGNOSIS — L98.9 DISORDER OF THE SKIN AND SUBCUTANEOUS TISSUE, UNSPECIFIED: ICD-10-CM

## 2022-12-04 DIAGNOSIS — E11.9 TYPE 2 DIABETES MELLITUS WITHOUT COMPLICATIONS: ICD-10-CM

## 2022-12-04 DIAGNOSIS — F17.210 NICOTINE DEPENDENCE, CIGARETTES, UNCOMPLICATED: ICD-10-CM

## 2022-12-04 DIAGNOSIS — F32.A DEPRESSION, UNSPECIFIED: ICD-10-CM

## 2022-12-04 DIAGNOSIS — R21 RASH AND OTHER NONSPECIFIC SKIN ERUPTION: ICD-10-CM

## 2022-12-04 DIAGNOSIS — Z88.0 ALLERGY STATUS TO PENICILLIN: ICD-10-CM

## 2022-12-04 PROCEDURE — 99283 EMERGENCY DEPT VISIT LOW MDM: CPT | Mod: FS

## 2022-12-04 RX ORDER — HYDROCORTISONE 1 %
1 OINTMENT (GRAM) TOPICAL
Qty: 1 | Refills: 0
Start: 2022-12-04 | End: 2022-12-10
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
## 2022-12-04 NOTE — ED PROVIDER NOTE - PHYSICAL EXAMINATION
Gen: NAD, AOx3  Head: NCAT  HEENT: PERRL, oral mucosa moist, normal conjunctiva, oropharynx clear without exudate or erythema  Lung: CTAB, no respiratory distress, no wheezing, rales, rhonchi  CV: normal s1/s2, rrr, Normal perfusion, pulses 2+ throughout  Abd: soft, NTND, no CVA tenderness  Genitourinary: no pelvic tenderness  MSK: No edema, no visible deformities, full range of motion in all 4 extremities  Neuro: No focal neurologic deficits  Skin: 5cm area of erythema to posterior neck with no streaking/drainage/crepitus  Psych: normal affect
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
## 2022-12-04 NOTE — ED PROVIDER NOTE - PATIENT PORTAL LINK FT
You can access the FollowMyHealth Patient Portal offered by [facility] by registering at the following website: [email or web address]. By joining [employer]’s FollowMyHealth portal, you will also be able to view your health information using other applications (apps) compatible with our system.

## 2022-12-04 NOTE — ED ADULT NURSE NOTE - NSICDXNOPASTSURGICALHX_GEN_ALL_CORE
<-- Click to add NO pertinent Past Medical History
<-- Click to add NO significant Past Surgical History
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
## 2023-01-31 NOTE — PATIENT PROFILE BEHAVIORAL HEALTH - NSBHMOTORBHV_PSY_A_CORE
psychomotor retardation Griseofulvin Counseling:  I discussed with the patient the risks of griseofulvin including but not limited to photosensitivity, cytopenia, liver damage, nausea/vomiting and severe allergy.  The patient understands that this medication is best absorbed when taken with a fatty meal (e.g., ice cream or french fries).

## 2023-02-03 NOTE — DISCHARGE NOTE BEHAVIORAL HEALTH - NSBHDCADMRISKMITFT_PSY_A_CORE
Patient here for first maintenance Dupixent dose 300mg.   tolerated administration well despite initial fears.  Report some of the itching is returning though not every day.  See MAR for details.  Patient to return in 2 weeks.  
continued close follow up with outpatient psychiatrist, discharged to psychiatric living facility

## 2023-02-25 NOTE — PROGRESS NOTE BEHAVIORAL HEALTH - RELATEDNESS
Regarding: covid positive/ cough/ runny nose/ sore throat  ----- Message from Elieser Almendarez sent at 2/25/2023 12:31 PM EST -----  " My grandmother just tested positive for covid and she is coughing, has a runny nose and sore throat   Is it possible for her to get some medication "
Good
Fair
Fair
Good
Good
Fair
Good
Good
Fair
Fair
Good
Good

## 2023-03-11 ENCOUNTER — EMERGENCY (EMERGENCY)
Facility: HOSPITAL | Age: 53
LOS: 0 days | Discharge: HOME | End: 2023-03-11
Attending: STUDENT IN AN ORGANIZED HEALTH CARE EDUCATION/TRAINING PROGRAM
Payer: MEDICARE

## 2023-03-11 VITALS
SYSTOLIC BLOOD PRESSURE: 194 MMHG | HEART RATE: 89 BPM | DIASTOLIC BLOOD PRESSURE: 106 MMHG | OXYGEN SATURATION: 96 % | TEMPERATURE: 97 F | RESPIRATION RATE: 17 BRPM

## 2023-03-11 DIAGNOSIS — J34.89 OTHER SPECIFIED DISORDERS OF NOSE AND NASAL SINUSES: ICD-10-CM

## 2023-03-11 DIAGNOSIS — Z88.8 ALLERGY STATUS TO OTHER DRUGS, MEDICAMENTS AND BIOLOGICAL SUBSTANCES: ICD-10-CM

## 2023-03-11 DIAGNOSIS — F32.A DEPRESSION, UNSPECIFIED: ICD-10-CM

## 2023-03-11 DIAGNOSIS — E11.9 TYPE 2 DIABETES MELLITUS WITHOUT COMPLICATIONS: ICD-10-CM

## 2023-03-11 DIAGNOSIS — Z88.0 ALLERGY STATUS TO PENICILLIN: ICD-10-CM

## 2023-03-11 DIAGNOSIS — R06.7 SNEEZING: ICD-10-CM

## 2023-03-11 DIAGNOSIS — Z79.4 LONG TERM (CURRENT) USE OF INSULIN: ICD-10-CM

## 2023-03-11 DIAGNOSIS — Z79.84 LONG TERM (CURRENT) USE OF ORAL HYPOGLYCEMIC DRUGS: ICD-10-CM

## 2023-03-11 DIAGNOSIS — Z88.6 ALLERGY STATUS TO ANALGESIC AGENT: ICD-10-CM

## 2023-03-11 DIAGNOSIS — T78.40XA ALLERGY, UNSPECIFIED, INITIAL ENCOUNTER: ICD-10-CM

## 2023-03-11 PROCEDURE — 99282 EMERGENCY DEPT VISIT SF MDM: CPT

## 2023-03-11 PROCEDURE — 99284 EMERGENCY DEPT VISIT MOD MDM: CPT | Mod: GC

## 2023-03-11 RX ORDER — DIPHENHYDRAMINE HCL 50 MG
1 CAPSULE ORAL
Qty: 28 | Refills: 0
Start: 2023-03-11 | End: 2023-03-24

## 2023-03-11 RX ORDER — DIPHENHYDRAMINE HCL 50 MG
50 CAPSULE ORAL ONCE
Refills: 0 | Status: COMPLETED | OUTPATIENT
Start: 2023-03-11 | End: 2023-03-11

## 2023-03-11 RX ADMIN — Medication 50 MILLIGRAM(S): at 19:40

## 2023-03-11 NOTE — ED PROVIDER NOTE - ATTENDING CONTRIBUTION TO CARE
52-year-old female past medical history of diabetes and depression presents the emergency department for evaluation of worsening allergy symptoms.  Patient reports for the past month she has worsening sneezing and runny nose.  Patient denies fevers, cough, shortness of breath, vomiting. Pt reports sx typically improve with benadryl. Patient requesting food.    CONSTITUTIONAL: NAD.   SKIN: warm, dry  HEAD: Normocephalic; atraumatic.  EYES: no conjunctival injection.    ENT: MMM. +nasal congestion   NECK: Supple.  CARD: RRR.   RESP: No wheezes, rales or rhonchi.  ABD: soft ntnd.   EXT: Normal ROM.  No lower extremity edema.   NEURO: Alert, oriented, grossly unremarkable.  PSYCH: Cooperative, appropriate.

## 2023-03-11 NOTE — ED ADULT NURSE NOTE - CAS ELECT INFOMATION PROVIDED
pt instructed to follow up with pmd 1-3 days or return to ed for new or worsening symptoms/DC instructions

## 2023-03-11 NOTE — ED PROVIDER NOTE - CLINICAL SUMMARY MEDICAL DECISION MAKING FREE TEXT BOX
52-year-old female presenting with rhinorrhea and sneezing x1 month.  Patient advised to take allergy medication.  Patient given primary care physician follow-up. Patient to be discharged from ED. Any available test results were discussed with patient and/or family. Verbal instructions given, including instructions to return to ED immediately for any new, worsening, or concerning symptoms. Patient endorsed understanding. Written discharge instructions additionally given, including follow-up plan.

## 2023-03-11 NOTE — ED PROVIDER NOTE - PHYSICAL EXAMINATION
Vital Signs: I have reviewed the initial vital signs.  Constitutional: well-nourished, appears stated age, no acute distress.  HEENT: Airway patent, moist MM, EOMI, Runny nose  CV: regular rate, regular rhythm, well-perfused extremities,   Lungs: no increased work of breathing.  ABD: Non-tender, Non-distended,   MSK: Neck supple, nontender, normal range of motion, able to ambulate without assistance  NEURO: A&Ox3,   PSYCH: Calm, cooperative, normal affect and interaction.

## 2023-03-11 NOTE — ED PROVIDER NOTE - OBJECTIVE STATEMENT
Patient is a 52y F hx of DM, depression presenting for worsening allergy symptoms for the past month including sneezing and runny nose. Patient has taken benadryl in the past which helps her symptoms, but hasn't been taking it as she ran out. Denies fever, chills, chest pain, nausea, vomiting, headache.

## 2023-03-11 NOTE — ED PROVIDER NOTE - PATIENT PORTAL LINK FT
You can access the FollowMyHealth Patient Portal offered by [facility] by registering at the following website: [email or web address]. By joining [employer]’s FollowMyHealth portal, you will also be able to view your health information using other applications (apps) compatible with our system.

## 2023-03-11 NOTE — ED PROVIDER NOTE - NSFOLLOWUPINSTRUCTIONS_ED_ALL_ED_FT
Allergic Rhinitis    Allergic rhinitis is when the mucous membranes in the nose respond to allergens. Allergens are particles in the air that cause your body to have an allergic reaction. This causes you to release allergic antibodies. Through a chain of events, these eventually cause you to release histamine into the blood stream. Although meant to protect the body, it is this release of histamine that causes your discomfort, such as frequent sneezing, congestion, and an itchy, runny nose.     CAUSES  Seasonal allergic rhinitis (hay fever) is caused by pollen allergens that may come from grasses, trees, and weeds. Year-round allergic rhinitis (perennial allergic rhinitis) is caused by allergens such as house dust mites, pet dander, and mold spores.    SYMPTOMS  Nasal stuffiness (congestion).  Itchy, runny nose with sneezing and tearing of the eyes.    DIAGNOSIS  Your health care provider can help you determine the allergen or allergens that trigger your symptoms. If you and your health care provider are unable to determine the allergen, skin or blood testing may be used. Your health care provider will diagnose your condition after taking your health history and performing a physical exam. Your health care provider may assess you for other related conditions, such as asthma, pink eye, or an ear infection.    TREATMENT  Allergic rhinitis does not have a cure, but it can be controlled by:    Medicines that block allergy symptoms. These may include allergy shots, nasal sprays, and oral antihistamines.  Avoiding the allergen.     Hay fever may often be treated with antihistamines in pill or nasal spray forms. Antihistamines block the effects of histamine. There are over-the-counter medicines that may help with nasal congestion and swelling around the eyes. Check with your health care provider before taking or giving this medicine.    If avoiding the allergen or the medicine prescribed do not work, there are many new medicines your health care provider can prescribe. Stronger medicine may be used if initial measures are ineffective. Desensitizing injections can be used if medicine and avoidance does not work. Desensitization is when a patient is given ongoing shots until the body becomes less sensitive to the allergen. Make sure you follow up with your health care provider if problems continue.    HOME CARE INSTRUCTIONS  It is not possible to completely avoid allergens, but you can reduce your symptoms by taking steps to limit your exposure to them. It helps to know exactly what you are allergic to so that you can avoid your specific triggers.    SEEK MEDICAL CARE IF:  You have a fever.  You develop a cough that does not stop easily (persistent).  You have shortness of breath.  You start wheezing.  Symptoms interfere with normal daily activities.    ADDITIONAL NOTES AND INSTRUCTIONS    Please follow up with your Primary MD in 24-48 hr.  Seek immediate medical care for any new/worsening signs or symptoms.

## 2023-03-14 ENCOUNTER — APPOINTMENT (OUTPATIENT)
Dept: OPHTHALMOLOGY | Facility: CLINIC | Age: 53
End: 2023-03-14

## 2023-03-20 ENCOUNTER — EMERGENCY (EMERGENCY)
Facility: HOSPITAL | Age: 53
LOS: 0 days | Discharge: HOME | End: 2023-03-20
Attending: EMERGENCY MEDICINE
Payer: MEDICARE

## 2023-03-20 VITALS — OXYGEN SATURATION: 99 % | SYSTOLIC BLOOD PRESSURE: 222 MMHG | HEART RATE: 99 BPM | DIASTOLIC BLOOD PRESSURE: 115 MMHG

## 2023-03-20 VITALS
TEMPERATURE: 99 F | OXYGEN SATURATION: 98 % | HEART RATE: 101 BPM | SYSTOLIC BLOOD PRESSURE: 204 MMHG | RESPIRATION RATE: 18 BRPM | DIASTOLIC BLOOD PRESSURE: 114 MMHG | WEIGHT: 169.98 LBS

## 2023-03-20 DIAGNOSIS — F32.A DEPRESSION, UNSPECIFIED: ICD-10-CM

## 2023-03-20 DIAGNOSIS — Z88.8 ALLERGY STATUS TO OTHER DRUGS, MEDICAMENTS AND BIOLOGICAL SUBSTANCES: ICD-10-CM

## 2023-03-20 DIAGNOSIS — Z79.4 LONG TERM (CURRENT) USE OF INSULIN: ICD-10-CM

## 2023-03-20 DIAGNOSIS — E11.9 TYPE 2 DIABETES MELLITUS WITHOUT COMPLICATIONS: ICD-10-CM

## 2023-03-20 DIAGNOSIS — Z88.0 ALLERGY STATUS TO PENICILLIN: ICD-10-CM

## 2023-03-20 DIAGNOSIS — Z04.6 ENCOUNTER FOR GENERAL PSYCHIATRIC EXAMINATION, REQUESTED BY AUTHORITY: ICD-10-CM

## 2023-03-20 DIAGNOSIS — Z88.6 ALLERGY STATUS TO ANALGESIC AGENT: ICD-10-CM

## 2023-03-20 PROCEDURE — 99282 EMERGENCY DEPT VISIT SF MDM: CPT

## 2023-03-20 PROCEDURE — 99283 EMERGENCY DEPT VISIT LOW MDM: CPT | Mod: GC

## 2023-03-20 NOTE — ED ADULT NURSE NOTE - OBJECTIVE STATEMENT
[facility], states she ran out and has not taken it in about 2 weeks. Pt denies pain at this time, breathing even and unlabored on RA, denies SI/HI, calm with RN during assessment.
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
## 2023-03-20 NOTE — ED PROVIDER NOTE - PATIENT PORTAL LINK FT
You can access the FollowMyHealth Patient Portal offered by [facility] by registering at the following website: [email or web address]. By joining [employer]’s FollowMyHealth portal, you will also be able to view your health information using other applications (apps) compatible with our system.
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
## 2023-03-20 NOTE — ED PROVIDER NOTE - PHYSICAL EXAMINATION
VITAL SIGNS: I have reviewed nursing notes and confirm.  CONSTITUTIONAL: well-appearing, non-toxic, NAD  SKIN: Warm dry, normal skin turgor  HEAD: NCAT  EYES: EOMI, PERRLA, no scleral icterus  ENT: Moist mucous membranes, normal pharynx with no erythema or exudates  NECK: Supple; non tender. Full ROM. No cervical LAD  CARD: RRR, no murmurs, rubs or gallops  RESP: clear to ausculation b/l.  No rales, rhonchi, or wheezing.  ABD: soft, + BS, non-tender, non-distended, no rebound or guarding. No CVA tenderness  EXT: Full ROM, no bony tenderness, no pedal edema, no calf tenderness  NEURO: normal motor. normal sensory. CN II-XII intact. Cerebellar testing normal. Normal gait.  PSYCH: Cooperative, appropriate. AxOx4. No hallucinations. No SI/HI.
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
## 2023-03-20 NOTE — ED PROVIDER NOTE - CLINICAL SUMMARY MEDICAL DECISION MAKING FREE TEXT BOX
Patient was sent to ED from a psych facilty for a psychiatric complaint.  She is calm in ED without agitation.  She is easily redirected verbally and calm on evaluation.  She wants to discuss her current emotional state with her own psychiatrist and I think this is reasonable.  Her BP was elevated due to her emotional state, and I think she is medically stable.  Will discharge back to [place].

## 2023-03-20 NOTE — ED ADULT NURSE REASSESSMENT NOTE - NS ED NURSE REASSESS COMMENT FT1
pt placed in gown and red socks, belongings including shirt, pants, socks, sneakers and single key on a black bracelet was handed to security.

## 2023-04-02 ENCOUNTER — EMERGENCY (EMERGENCY)
Facility: HOSPITAL | Age: 53
LOS: 0 days | Discharge: HOME | End: 2023-04-02
Attending: EMERGENCY MEDICINE
Payer: MEDICARE

## 2023-04-02 ENCOUNTER — EMERGENCY (EMERGENCY)
Facility: HOSPITAL | Age: 53
LOS: 0 days | Discharge: LEFT BEFORE DISCHARGE | End: 2023-04-02
Attending: EMERGENCY MEDICINE
Payer: MEDICARE

## 2023-04-02 VITALS
SYSTOLIC BLOOD PRESSURE: 145 MMHG | HEART RATE: 78 BPM | RESPIRATION RATE: 18 BRPM | DIASTOLIC BLOOD PRESSURE: 78 MMHG | TEMPERATURE: 98 F | WEIGHT: 154.98 LBS | OXYGEN SATURATION: 98 %

## 2023-04-02 VITALS
HEART RATE: 76 BPM | OXYGEN SATURATION: 99 % | TEMPERATURE: 98 F | RESPIRATION RATE: 18 BRPM | SYSTOLIC BLOOD PRESSURE: 117 MMHG | DIASTOLIC BLOOD PRESSURE: 83 MMHG

## 2023-04-02 DIAGNOSIS — Z88.6 ALLERGY STATUS TO ANALGESIC AGENT: ICD-10-CM

## 2023-04-02 DIAGNOSIS — R45.1 RESTLESSNESS AND AGITATION: ICD-10-CM

## 2023-04-02 DIAGNOSIS — F32.A DEPRESSION, UNSPECIFIED: ICD-10-CM

## 2023-04-02 DIAGNOSIS — I10 ESSENTIAL (PRIMARY) HYPERTENSION: ICD-10-CM

## 2023-04-02 DIAGNOSIS — Z88.8 ALLERGY STATUS TO OTHER DRUGS, MEDICAMENTS AND BIOLOGICAL SUBSTANCES STATUS: ICD-10-CM

## 2023-04-02 DIAGNOSIS — J45.909 UNSPECIFIED ASTHMA, UNCOMPLICATED: ICD-10-CM

## 2023-04-02 DIAGNOSIS — F20.9 SCHIZOPHRENIA, UNSPECIFIED: ICD-10-CM

## 2023-04-02 DIAGNOSIS — Z04.6 ENCOUNTER FOR GENERAL PSYCHIATRIC EXAMINATION, REQUESTED BY AUTHORITY: ICD-10-CM

## 2023-04-02 DIAGNOSIS — Z79.4 LONG TERM (CURRENT) USE OF INSULIN: ICD-10-CM

## 2023-04-02 DIAGNOSIS — E78.5 HYPERLIPIDEMIA, UNSPECIFIED: ICD-10-CM

## 2023-04-02 DIAGNOSIS — Z88.0 ALLERGY STATUS TO PENICILLIN: ICD-10-CM

## 2023-04-02 DIAGNOSIS — Z53.29 PROCEDURE AND TREATMENT NOT CARRIED OUT BECAUSE OF PATIENT'S DECISION FOR OTHER REASONS: ICD-10-CM

## 2023-04-02 DIAGNOSIS — E11.9 TYPE 2 DIABETES MELLITUS WITHOUT COMPLICATIONS: ICD-10-CM

## 2023-04-02 LAB
ALBUMIN SERPL ELPH-MCNC: 3.9 G/DL — SIGNIFICANT CHANGE UP (ref 3.5–5.2)
ALP SERPL-CCNC: 94 U/L — SIGNIFICANT CHANGE UP (ref 30–115)
ALT FLD-CCNC: 13 U/L — SIGNIFICANT CHANGE UP (ref 0–41)
ANION GAP SERPL CALC-SCNC: 6 MMOL/L — LOW (ref 7–14)
APAP SERPL-MCNC: <5 UG/ML — LOW (ref 10–30)
AST SERPL-CCNC: 14 U/L — SIGNIFICANT CHANGE UP (ref 0–41)
BASOPHILS # BLD AUTO: 0.06 K/UL — SIGNIFICANT CHANGE UP (ref 0–0.2)
BASOPHILS NFR BLD AUTO: 0.5 % — SIGNIFICANT CHANGE UP (ref 0–1)
BILIRUB SERPL-MCNC: 0.2 MG/DL — SIGNIFICANT CHANGE UP (ref 0.2–1.2)
BUN SERPL-MCNC: 11 MG/DL — SIGNIFICANT CHANGE UP (ref 10–20)
CALCIUM SERPL-MCNC: 9.6 MG/DL — SIGNIFICANT CHANGE UP (ref 8.4–10.5)
CHLORIDE SERPL-SCNC: 100 MMOL/L — SIGNIFICANT CHANGE UP (ref 98–110)
CO2 SERPL-SCNC: 27 MMOL/L — SIGNIFICANT CHANGE UP (ref 17–32)
CREAT SERPL-MCNC: 0.7 MG/DL — SIGNIFICANT CHANGE UP (ref 0.7–1.5)
EGFR: 104 ML/MIN/1.73M2 — SIGNIFICANT CHANGE UP
EOSINOPHIL # BLD AUTO: 0.31 K/UL — SIGNIFICANT CHANGE UP (ref 0–0.7)
EOSINOPHIL NFR BLD AUTO: 2.8 % — SIGNIFICANT CHANGE UP (ref 0–8)
ETHANOL SERPL-MCNC: <10 MG/DL — SIGNIFICANT CHANGE UP
GLUCOSE SERPL-MCNC: 126 MG/DL — HIGH (ref 70–99)
HCT VFR BLD CALC: 40.6 % — SIGNIFICANT CHANGE UP (ref 37–47)
HGB BLD-MCNC: 12.8 G/DL — SIGNIFICANT CHANGE UP (ref 12–16)
IMM GRANULOCYTES NFR BLD AUTO: 0.2 % — SIGNIFICANT CHANGE UP (ref 0.1–0.3)
LYMPHOCYTES # BLD AUTO: 2.36 K/UL — SIGNIFICANT CHANGE UP (ref 1.2–3.4)
LYMPHOCYTES # BLD AUTO: 21.4 % — SIGNIFICANT CHANGE UP (ref 20.5–51.1)
MCHC RBC-ENTMCNC: 28.3 PG — SIGNIFICANT CHANGE UP (ref 27–31)
MCHC RBC-ENTMCNC: 31.5 G/DL — LOW (ref 32–37)
MCV RBC AUTO: 89.8 FL — SIGNIFICANT CHANGE UP (ref 81–99)
MONOCYTES # BLD AUTO: 0.67 K/UL — HIGH (ref 0.1–0.6)
MONOCYTES NFR BLD AUTO: 6.1 % — SIGNIFICANT CHANGE UP (ref 1.7–9.3)
NEUTROPHILS # BLD AUTO: 7.61 K/UL — HIGH (ref 1.4–6.5)
NEUTROPHILS NFR BLD AUTO: 69 % — SIGNIFICANT CHANGE UP (ref 42.2–75.2)
NRBC # BLD: 0 /100 WBCS — SIGNIFICANT CHANGE UP (ref 0–0)
PLATELET # BLD AUTO: 416 K/UL — HIGH (ref 130–400)
POTASSIUM SERPL-MCNC: 4.3 MMOL/L — SIGNIFICANT CHANGE UP (ref 3.5–5)
POTASSIUM SERPL-SCNC: 4.3 MMOL/L — SIGNIFICANT CHANGE UP (ref 3.5–5)
PROT SERPL-MCNC: 6.6 G/DL — SIGNIFICANT CHANGE UP (ref 6–8)
RBC # BLD: 4.52 M/UL — SIGNIFICANT CHANGE UP (ref 4.2–5.4)
RBC # FLD: 14.3 % — SIGNIFICANT CHANGE UP (ref 11.5–14.5)
SALICYLATES SERPL-MCNC: <0.3 MG/DL — LOW (ref 4–30)
SODIUM SERPL-SCNC: 133 MMOL/L — LOW (ref 135–146)
WBC # BLD: 11.03 K/UL — HIGH (ref 4.8–10.8)
WBC # FLD AUTO: 11.03 K/UL — HIGH (ref 4.8–10.8)

## 2023-04-02 PROCEDURE — 99282 EMERGENCY DEPT VISIT SF MDM: CPT | Mod: 27

## 2023-04-02 PROCEDURE — L9997: CPT

## 2023-04-02 PROCEDURE — 99285 EMERGENCY DEPT VISIT HI MDM: CPT

## 2023-04-02 NOTE — ED BEHAVIORAL HEALTH ASSESSMENT NOTE - DETAILS
denies past or current suicidality see doc spoke with [facility] staff reports that "all of her medications make her tired"
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
## 2023-04-02 NOTE — ED PROVIDER NOTE - PATIENT PORTAL LINK FT
You can access the FollowMyHealth Patient Portal offered by [facility] by registering at the following website: [email or web address]. By joining [employer]’s FollowMyHealth portal, you will also be able to view your health information using other applications (apps) compatible with our system.

## 2023-04-02 NOTE — ED PROVIDER NOTE - PHYSICAL EXAMINATION
CONSTITUTIONAL: Well-developed; well-nourished; NAD  SKIN: warm, dry, w/o rash  HEAD: NCAT  EYES: PERRLA, EOMI, no conjunctival injection  ENT: No nasal discharge; nl OP without erythema or exudates  NECK: Supple, non-tender  CARD: nl S1, S2; RRR, no MRG, no JVD  RESP: CTAB, normal respiratory effort  ABD: BS+, soft, NTND, no HSM  EXT: Normal ROM.  No clubbing, cyanosis or edema  NEURO: Alert, oriented, grossly unremarkable  PSYCH: Cooperative

## 2023-04-02 NOTE — ED ADULT NURSE NOTE - OBJECTIVE STATEMENT
Pt BIBA from [address] [place] [address] c/o of being aggressive with other patients and staff. Pt calm and cooperative in triage. No c/o. Pt denies HI/SI. Pt BIBA from [address] [place] [address] c/o of being aggressive with other patients and staff. Pt calm and cooperative in triage. No c/o. Pt denies HI/SI. Patient remains calm and cooperative within the emergency department. Patient is alert and oriented x three.

## 2023-04-02 NOTE — ED PROVIDER NOTE - CLINICAL SUMMARY MEDICAL DECISION MAKING FREE TEXT BOX
Patient with disruptive behavior in the setting of noncompliance of medication.  Cleared by psych discharge back to [facility] psych facility.

## 2023-04-02 NOTE — ED PROVIDER NOTE - CARE PROVIDER_API CALL
Tesha White)  Geriatric Medicine; Internal Medicine  [address] [place] [address] [id number]  Phone: [phone]  Fax: [phone]  Established Patient  Follow Up Time: 1-3 Days

## 2023-04-02 NOTE — ED ADULT NURSE NOTE - CHIEF COMPLAINT QUOTE
Pt BIBA from [address] [place] [address] for non-compliance with psych meds. pt has hx schizophrenia. Denies si/hi. 1:1 initiated for safety

## 2023-04-02 NOTE — ED ADULT TRIAGE NOTE - CHIEF COMPLAINT QUOTE
Pt BIBA from [address] [place] [address] for non-compliance with psych meds. pt has hx schizophrenia. Denies si/hi. 1:1 initiated for safety

## 2023-04-02 NOTE — ED BEHAVIORAL HEALTH ASSESSMENT NOTE - SUMMARY
MS. OCONNOR is a 54F, domiciled at [facility], single, unemployed/disabled, with no dependents, with PMH of Insulin-dependent DM, HLD, HTN, and asthma, and PPH of Schizophrenia (with multiple inpt psych admissions, including State facility- [facility], last IPP at [facility] was 8/13/19-9/4/19), who was brought in to the ED by ambulance referred by her residence for worsening disruptive behavior/screaming in the context of recent medication noncompliance. Psychiatry was consulted to perform a mental health evaluation.    Upon evaluation patient is psychiatrically stable with no overt signs of psychosis, param, depression, suicidality, homicidal, anxiety, or other psychiatric concerns.  Despite noncompliance with outpatient medication regimen, and collateral report of some decompensation resulting in behavioral issues, patient is not an acute danger to self or others and does not require inpatient psychiatric hospitalization.  There are no acute safety concerns from the writer's perspective or that of the collateral.  Patient has followup with outpt psychiatrist Dr. Hendrickson on 4/6 and can resume treatment at her residence wherein she would benefit from adhering to her outpatient regimen.  There are no psychiatric contraindications to discharge.    Recommendations:  - No psychiatric contraindication to discharge   - f/u with Dr. hendrickson on 4/6, and with [facility] staff/providers upon discharge

## 2023-04-02 NOTE — ED PROVIDER NOTE - ATTENDING CONTRIBUTION TO CARE
Patient with extensive psych history from [facility] center brought in for worsening disruptive behavior screaming with medication noncompliance.  Patient denies any suicidal or homicidal ideation.  CON: ao x 3, HENMT: neck supple,  CV: s1s2 ctab, RESP: cta b/l, GI:  soft, nontender, no rebound, no guarding, SKIN: no rash, MSK: no deformities, NEURO: no gross motor or sensory deficit Psychiatric: appropriate mood, appropriate affect here patient medically cleared seen by psych and cleared for discharge.

## 2023-04-02 NOTE — ED PROVIDER NOTE - OBJECTIVE STATEMENT
53 yo female with a pmh of dm and depression sent in from [facility] for aggressive behavior. pt states to have gotten upset but denies any assault to staff or residents. pt denies any SI/HI/hallucinations. pt denies any other symptoms including fevers, chill, headache, recent illness/travel, cough, abdominal pain, chest pain, or SOB.

## 2023-04-02 NOTE — ED PROVIDER NOTE - PROGRESS NOTE DETAILS
PT seen by psych, cleared -CD PT seen by psych, cleared, PT refused ekg, labs wnl, will dc with pcp fu,strict return precautions -CD

## 2023-04-02 NOTE — ED ADULT TRIAGE NOTE - CHIEF COMPLAINT QUOTE
Pt BIBA from [address] [place] [address] c/o of being aggressive with other patients and staff. Pt calm and cooperative in triage. No c/o. Pt denies HI/SI.

## 2023-04-02 NOTE — ED BEHAVIORAL HEALTH ASSESSMENT NOTE - RISK ASSESSMENT
risk factors: hx of psychotic disorder, hx of past psych hospitalizations    protective: domiciled in supportive residence, supportive family, engaged in treatment, sobriety

## 2023-04-02 NOTE — ED BEHAVIORAL HEALTH ASSESSMENT NOTE - HPI (INCLUDE ILLNESS QUALITY, SEVERITY, DURATION, TIMING, CONTEXT, MODIFYING FACTORS, ASSOCIATED SIGNS AND SYMPTOMS)
MS. OCONNOR is a 54F, domiciled at [facility] residence, single, unemployed/disabled, with no dependents, with PMH of Insulin-dependent DM, HLD, HTN, and asthma, and PPH of Schizophrenia (with multiple inpt psych admissions, including State facility- [facility], last IPP at [facility] was 8/13/19-9/4/19), who was brought in to the ED by ambulance referred by her residence for worsening disruptive behavior/screaming in the context of recent medication noncompliance. Psychiatry was consulted to perform a mental health evaluation.    Upon evaluation pt is calm and cooperative.  Does not appear to be reacting to internal stimuli.  Good eye contact. Linear thought process.      Patient reports that she is feeling "fine" today and denies any psychiatric complaints.  Denies depressive symptoms, psychotic symptoms, manic symptoms, anxiety, suicidal ideations, homicidal ideations.  No delusions elicited.  Patient denies recent substance use.  She denies violent or agitated or aggressive behavior at her residence.  She reports that her psychiatrist Dr Hendrickson had "dismissed her medications a few months ago" and she reports that she not longer needs ot take them.  She is unable to say when she stopped taking her psychotropic meds, and cannot recall what medication she was taking, however she states that they were making her "slow" and "sad."  Patient reports feeling better since the meds were "dismissed."  She reports good sleep, appetite, enjoys watching TV.  Denies any issues with [facility] staff or other residents.  Patient reports feeling safe and comfortable with discharging home today, she is not wanting inpatient psych admission.      pt gives writer consent to speak with AOT team and with [facility] .    AOT team representative, Cheryl, states that the pt has been refusing medication for the past 2 months and has been more disruptive at her residence - essentially yelling at staff or other residents.  She denies knowledge of the patient being overtly psychotic, manic, aggressive, violent, homicidal or suicidal.  She states that he reason for referring pt to the ED was because the pt has been disruptive and not taking her medications, no other issues.      Pau from [facility] states the pt was sent in this morning to the ED for evaluation, however the pt eloped from the ED, and so the pt was sent back to the ED.  She states the pt has been refusing oral medications for the past 2 months but received her fluphenazine dec injection on 3/6/23.  She states the pt has been disruptive: yelling at staff, turning up the volume on the TV and refusing to turn it down; however she denies any overt agitation, violence or homicidal behavior.  She denies the patient appearing overtly manic or psychotic.  She denies any acute safety concerns if the pt were to discharge home today.  She gives medication list below:    benztropine 2 mg BID  fluphenazine decanoate-  last given 3/6/23	  olanzapine 15 mg Qhs	  trazodone 100 mg Qhs

## 2023-04-07 ENCOUNTER — EMERGENCY (EMERGENCY)
Facility: HOSPITAL | Age: 53
LOS: 0 days | Discharge: HOME | End: 2023-04-07
Attending: EMERGENCY MEDICINE
Payer: MEDICARE

## 2023-04-07 VITALS
DIASTOLIC BLOOD PRESSURE: 88 MMHG | OXYGEN SATURATION: 98 % | SYSTOLIC BLOOD PRESSURE: 170 MMHG | HEART RATE: 100 BPM | RESPIRATION RATE: 18 BRPM | TEMPERATURE: 98 F

## 2023-04-07 DIAGNOSIS — Z88.6 ALLERGY STATUS TO ANALGESIC AGENT: ICD-10-CM

## 2023-04-07 DIAGNOSIS — Z88.0 ALLERGY STATUS TO PENICILLIN: ICD-10-CM

## 2023-04-07 DIAGNOSIS — E78.5 HYPERLIPIDEMIA, UNSPECIFIED: ICD-10-CM

## 2023-04-07 DIAGNOSIS — E11.9 TYPE 2 DIABETES MELLITUS WITHOUT COMPLICATIONS: ICD-10-CM

## 2023-04-07 DIAGNOSIS — R45.4 IRRITABILITY AND ANGER: ICD-10-CM

## 2023-04-07 DIAGNOSIS — Z88.8 ALLERGY STATUS TO OTHER DRUGS, MEDICAMENTS AND BIOLOGICAL SUBSTANCES: ICD-10-CM

## 2023-04-07 DIAGNOSIS — I10 ESSENTIAL (PRIMARY) HYPERTENSION: ICD-10-CM

## 2023-04-07 DIAGNOSIS — F48.9 NONPSYCHOTIC MENTAL DISORDER, UNSPECIFIED: ICD-10-CM

## 2023-04-07 DIAGNOSIS — F32.A DEPRESSION, UNSPECIFIED: ICD-10-CM

## 2023-04-07 DIAGNOSIS — Z79.4 LONG TERM (CURRENT) USE OF INSULIN: ICD-10-CM

## 2023-04-07 PROCEDURE — 99282 EMERGENCY DEPT VISIT SF MDM: CPT

## 2023-04-07 PROCEDURE — 99284 EMERGENCY DEPT VISIT MOD MDM: CPT | Mod: GC

## 2023-04-07 PROCEDURE — 82962 GLUCOSE BLOOD TEST: CPT

## 2023-04-07 NOTE — ED PROVIDER NOTE - PATIENT PORTAL LINK FT
You can access the FollowMyHealth Patient Portal offered by [facility] by registering at the following website: [email or web address]. By joining [employer]’s FollowMyHealth portal, you will also be able to view your health information using other applications (apps) compatible with our system.
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
## 2023-04-21 ENCOUNTER — EMERGENCY (EMERGENCY)
Facility: HOSPITAL | Age: 53
LOS: 0 days | Discharge: LEFT BEFORE DISCHARGE | End: 2023-04-22
Attending: EMERGENCY MEDICINE
Payer: MEDICARE

## 2023-04-21 VITALS
SYSTOLIC BLOOD PRESSURE: 132 MMHG | DIASTOLIC BLOOD PRESSURE: 82 MMHG | TEMPERATURE: 98 F | HEART RATE: 98 BPM | OXYGEN SATURATION: 98 % | RESPIRATION RATE: 17 BRPM

## 2023-04-21 DIAGNOSIS — E78.5 HYPERLIPIDEMIA, UNSPECIFIED: ICD-10-CM

## 2023-04-21 DIAGNOSIS — R45.1 RESTLESSNESS AND AGITATION: ICD-10-CM

## 2023-04-21 DIAGNOSIS — I10 ESSENTIAL (PRIMARY) HYPERTENSION: ICD-10-CM

## 2023-04-21 DIAGNOSIS — Z79.84 LONG TERM (CURRENT) USE OF ORAL HYPOGLYCEMIC DRUGS: ICD-10-CM

## 2023-04-21 DIAGNOSIS — Z88.0 ALLERGY STATUS TO PENICILLIN: ICD-10-CM

## 2023-04-21 DIAGNOSIS — Z88.8 ALLERGY STATUS TO OTHER DRUGS, MEDICAMENTS AND BIOLOGICAL SUBSTANCES: ICD-10-CM

## 2023-04-21 DIAGNOSIS — E11.9 TYPE 2 DIABETES MELLITUS WITHOUT COMPLICATIONS: ICD-10-CM

## 2023-04-21 DIAGNOSIS — F32.A DEPRESSION, UNSPECIFIED: ICD-10-CM

## 2023-04-21 DIAGNOSIS — Z79.4 LONG TERM (CURRENT) USE OF INSULIN: ICD-10-CM

## 2023-04-21 DIAGNOSIS — Z88.6 ALLERGY STATUS TO ANALGESIC AGENT: ICD-10-CM

## 2023-04-21 PROCEDURE — 99285 EMERGENCY DEPT VISIT HI MDM: CPT

## 2023-04-21 PROCEDURE — 99284 EMERGENCY DEPT VISIT MOD MDM: CPT
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
## 2023-04-22 VITALS
RESPIRATION RATE: 18 BRPM | OXYGEN SATURATION: 99 % | HEART RATE: 81 BPM | TEMPERATURE: 97 F | DIASTOLIC BLOOD PRESSURE: 78 MMHG | SYSTOLIC BLOOD PRESSURE: 135 MMHG

## 2023-04-22 NOTE — ED PROVIDER NOTE - CLINICAL SUMMARY MEDICAL DECISION MAKING FREE TEXT BOX
Pt with agitation, pt refused complete agitation.  Pt was given strict return to ED precautions and pt indicated that he/she understood.

## 2023-04-22 NOTE — ED PROVIDER NOTE - CARE PROVIDER_API CALL
Tesha White)  Geriatric Medicine; Internal Medicine  [address] [place] [phone]  Phone: [phone]  Fax: [phone]  Follow Up Time: 1-3 Days

## 2023-04-22 NOTE — ED PROVIDER NOTE - PROGRESS NOTE DETAILS
PS: Tried multiple times to convince pt to do fingerstick. Pt refusing. Pt alert, answering questions appropriately, understands risk of not doing fingerstick. Otherwise well appearing, will dc

## 2023-04-22 NOTE — ED PROVIDER NOTE - PATIENT PORTAL LINK FT
You can access the FollowMyHealth Patient Portal offered by [facility] by registering at the following website: [email or web address]. By joining [employer]’s FollowMyHealth portal, you will also be able to view your health information using other applications (apps) compatible with our system.

## 2023-04-22 NOTE — ED PROVIDER NOTE - ATTENDING CONTRIBUTION TO CARE
I personally evaluated the patient. I reviewed the Resident’s or Physician Assistant’s note (as assigned above), and agree with the findings and plan except as documented in my note.  52-year-old female presenting from [facility] for evaluation of aggressive behavior.  As the patient was watching TV and she wanted to change the channel with someone psychomotor after she had an verbal outbursts.  Denies any current complaints.  Patient is diabetic and as per the triage note, her blood glucose at the psychiatric facility has been elevated.  Patient declined any further evaluation regarding her diabetes.  Refusing fingerstick here.  States that her baseline blood sugar is a little nauseous.  VSS, non toxic appearing, NAD, Head NCAT, MMM, neck supple, normal ROM, normal s1s2, lungs ctab, abd s/nt/nd, no guarding or rebound, extremities wnl, AAO x 3, GCS 15, neuro grossly normal. No acute skin lesions.  Affect is normal patient is currently calm.  Plan is evaluated hyperglycemia EKG, labs, imaging as needed and reassess.
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
## 2023-04-22 NOTE — ED PROVIDER NOTE - PHYSICAL EXAMINATION
CONST: well appearing for age  HEAD:  normocephalic, atraumatic  EYES:  conjunctivae without injection, drainage or discharge  ENMT: nasal mucosa moist; mouth moist without ulcerations or lesions; throat moist without erythema, exudate, ulcerations or lesions  NECK:  supple  CARDIAC:  regular rate and rhythm, normal S1 and S2, no murmurs, rubs or gallops  RESP:  respiratory rate and effort appear normal for age; lungs are clear to auscultation bilaterally; no rales or wheezes  ABDOMEN:  soft, nontender, nondistended  MUSCULOSKELETAL/NEURO: awake and alert, CUEVA, normal movement, normal tone  SKIN:  normal skin color for age and race, well-perfused; warm and dry

## 2023-04-22 NOTE — ED ADULT NURSE NOTE - IS THE PATIENT ABLE TO BE SCREENED?
Yes
Chief complaint of fever, chills body aches, nonproductive coughing x 1 week  Pt’s symptoms are during current COVID-19 Pandemic crisis in [place].  Meds: Metformin
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
## 2023-04-22 NOTE — ED ADULT NURSE NOTE - NSIMPLEMENTINTERV_GEN_ALL_ED
Implemented All Universal Safety Interventions:  [place] to call system. Call bell, personal items and telephone within reach. Instruct patient to call for assistance. Room bathroom lighting operational. Non-slip footwear when patient is off stretcher. Physically safe environment: no spills, clutter or unnecessary equipment. Stretcher in lowest position, wheels locked, appropriate side rails in place.

## 2023-04-22 NOTE — ED ADULT NURSE NOTE - OBJECTIVE STATEMENT
Pt was sent from [facility]. 4 second floor for dispute over a sandwich, pt has hx of dm, refusing all the medications, hyperglycemic in 300s, was told she cannot eat the sandwich and she started to throw plates at the facility. Pt refusing FS.

## 2023-08-25 ENCOUNTER — EMERGENCY (EMERGENCY)
Facility: HOSPITAL | Age: 53
LOS: 0 days | Discharge: HOME | End: 2023-08-26
Attending: EMERGENCY MEDICINE
Payer: MEDICARE

## 2023-08-25 DIAGNOSIS — E78.5 HYPERLIPIDEMIA, UNSPECIFIED: ICD-10-CM

## 2023-08-25 DIAGNOSIS — Z88.0 ALLERGY STATUS TO PENICILLIN: ICD-10-CM

## 2023-08-25 DIAGNOSIS — Z88.8 ALLERGY STATUS TO OTHER DRUGS, MEDICAMENTS AND BIOLOGICAL SUBSTANCES: ICD-10-CM

## 2023-08-25 DIAGNOSIS — Z88.6 ALLERGY STATUS TO ANALGESIC AGENT: ICD-10-CM

## 2023-08-25 DIAGNOSIS — R45.1 RESTLESSNESS AND AGITATION: ICD-10-CM
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
## 2023-08-25 DIAGNOSIS — I10 ESSENTIAL (PRIMARY) HYPERTENSION: ICD-10-CM

## 2023-08-25 DIAGNOSIS — F32.A DEPRESSION, UNSPECIFIED: ICD-10-CM

## 2023-08-25 DIAGNOSIS — Z79.4 LONG TERM (CURRENT) USE OF INSULIN: ICD-10-CM
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
## 2023-08-25 DIAGNOSIS — E11.9 TYPE 2 DIABETES MELLITUS WITHOUT COMPLICATIONS: ICD-10-CM

## 2023-08-25 PROCEDURE — 99285 EMERGENCY DEPT VISIT HI MDM: CPT

## 2023-08-25 PROCEDURE — 99283 EMERGENCY DEPT VISIT LOW MDM: CPT | Mod: GC

## 2023-08-26 VITALS
OXYGEN SATURATION: 98 % | TEMPERATURE: 98 F | SYSTOLIC BLOOD PRESSURE: 116 MMHG | RESPIRATION RATE: 18 BRPM | DIASTOLIC BLOOD PRESSURE: 80 MMHG | HEART RATE: 102 BPM

## 2023-08-26 NOTE — ED PROVIDER NOTE - PATIENT PORTAL LINK FT
You can access the FollowMyHealth Patient Portal offered by [facility] by registering at the following website: [email or web address]. By joining [employer]’s FollowMyHealth portal, you will also be able to view your health information using other applications (apps) compatible with our system.

## 2023-08-26 NOTE — ED ADULT TRIAGE NOTE - CHIEF COMPLAINT QUOTE
Pt BIBA from [address] [place] [address] after pt was banging on the walls, pt calm cooperative and not aggressive in triage. 1;1 initiated in triage for elopement risk

## 2023-08-26 NOTE — ED PROVIDER NOTE - PHYSICAL EXAMINATION
CONST: well appearing for age, NAD  HEAD:  normocephalic, atraumatic  EYES:  conjunctivae without injection, drainage or discharge  ENMT:  nasal mucosa moist; mouth moist without ulcerations or lesions; throat moist without erythema, exudate, ulcerations or lesions  NECK:  supple, no midline tenderness, full ROM  CARDIAC:  regular rate and rhythm, normal S1 and S2, no murmurs, rubs or gallops  RESP:  respiratory rate and effort appear normal for age; lungs are clear to auscultation bilaterally; no rales or wheezes  ABDOMEN:  soft, nontender, nondistended  MUSCULOSKELETAL/NEURO:  awake and alert, CUEVA, normal movement, normal tone  SKIN:  normal skin color for age and race, well-perfused; warm and dry

## 2023-08-26 NOTE — ED ADULT NURSE NOTE - NS ED NURSE DC INFO COMPLEXITY
Advance Care Planning     Advance Care Planning Activator (Inpatient)  Conversation Note      Date of ACP Conversation: 7/13/2021     Conversation Conducted with: Patient with Decision Making Capacity    ACP Activator: TASH Dawn, [facility] Decision Maker:     Current Designated Health Care Decision Maker:     Primary Decision Maker: Chau Dillard - [phone]  Today we documented Decision Maker(s). The patient will provide ACP documents. Care Preferences    Ventilation: \"If you were in your present state of health and suddenly became very ill and were unable to breathe on your own, what would your preference be about the use of a ventilator (breathing machine) if it were available to you? \"      Would the patient desire the use of ventilator (breathing machine)?: yes    \"If your health worsens and it becomes clear that your chance of recovery is unlikely, what would your preference be about the use of a ventilator (breathing machine) if it were available to you? \"     Would the patient desire the use of ventilator (breathing machine)?: No      Resuscitation  \"CPR works best to restart the heart when there is a sudden event, like a heart attack, in someone who is otherwise healthy. Unfortunately, CPR does not typically restart the heart for people who have serious health conditions or who are very sick. \"    \"In the event your heart stopped as a result of an underlying serious health condition, would you want attempts to be made to restart your heart (answer \"yes\" for attempt to resuscitate) or would you prefer a natural death (answer \"no\" for do not attempt to resuscitate)? \" yes       [] Yes   [x] No   Educated Patient / Darío Hauser regarding differences between Advance Directives and portable DNR orders.     Length of ACP Conversation in minutes:  5    Conversation Outcomes:  [x] ACP discussion completed  [] Existing advance directive reviewed with patient; no changes to patient's previously recorded wishes  [] New Advance Directive completed  [] Portable Do Not Rescitate prepared for Provider review and signature  [] POLST/POST/MOLST/MOST prepared for Provider review and signature      Follow-up plan:    [] Schedule follow-up conversation to continue planning  [] Referred individual to Provider for additional questions/concerns   [] Advised patient/agent/surrogate to review completed ACP document and update if needed with changes in condition, patient preferences or care setting    [x] This note routed to one or more involved healthcare providers       TASH Rivera, [place], Social Work/Case Management   [phone]  Electronically signed by TASH Rivera, [place] on 7/13/2021 at 4:16 PM Simple: Patient demonstrates quick and easy understanding/Verbalized Understanding
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
## 2023-08-26 NOTE — ED PROVIDER NOTE - NSFOLLOWUPINSTRUCTIONS_ED_ALL_ED_FT
Depression is a mood disorder that causes feelings of sadness or hopelessness that do not go away. Depression may cause you to lose interest in things you used to enjoy. These feelings may interfere with your daily life.    Common signs and symptoms:    Appetite changes, or weight gain or loss    Trouble falling or staying asleep, or sleeping too much    Fatigue (being mentally and physically tired) or lack of energy    Feeling restless, irritable, or withdrawn    Feeling worthless, hopeless, discouraged, or guilty    Trouble concentrating, remembering things, doing daily tasks, or making decisions    Thoughts about hurting or killing yourself  Call your local emergency number (911 in the ) if:    You think about hurting yourself or someone else.    You have done something on purpose to hurt yourself.  Call your therapist or doctor if:    Your symptoms get worse or do not get better with treatment.    Your depression keeps you from doing your regular daily activities.    You have new symptoms since your last visit.    You have questions or concerns about your condition or care.  The following resources are available at any time to help you, if needed:    Contact a suicide prevention organization:  For the [employer] Suicide and Crisis Lifeline:  Call or text [employer]    Send a chat on https://St[employer].org/chat    Call 1-913.589.6269 (1-800-273-TALK)    For the Suicide Hotline, call 1-856.247.1605 (5-937-VMZGVRE)    For a list of international numbers: https://save.org/find-help/international-resources/  Treatment for depression depends on how severe your symptoms are. You may need any of the following:    Cognitive behavioral therapy (CBT) teaches you how to identify and change negative thought patterns.    Antidepressant medicine may be given to decrease or manage symptoms. You may need to take this medicine for several weeks before they start working.  Self-care:    Talk to someone about your depression. Your healthcare provider may suggest counseling. You might feel more comfortable talking with a friend or family member about your depression. Choose someone you know will be supportive and encouraging.    Get regular physical activity. Physical activity can lower your stress, improve your mood, and help you sleep better. Work with your healthcare provider to develop a plan that you enjoy.   FAMILY WALKING FOR EXERCISE      Create a regular sleep schedule. A routine can help you relax before bed. Listen to music, read, or do yoga. Try to go to bed and wake up at the same time every day. Sleep is important for emotional health.    Eat a variety of healthy foods. Healthy foods include fruits, vegetables, whole-grain breads, low-fat dairy products, lean meats, fish, and cooked beans. A healthy meal plan is low in fat, salt, and added sugar.  Healthy Foods      Do not use alcohol, drugs, or nicotine products. These can worsen depression or make it hard to manage. Talk to your therapist or healthcare provider if you use any of these products and need help to quit.  Follow up with your therapist or doctor as directed: Your healthcare provider will monitor your progress at follow-up visits. Your provider will also monitor your medicine if you take antidepressants and ask if the medicine is helping. Tell your provider about any side effects or problems you have with your medicine. The type or amount of medicine may need to be changed. Write down your questions so you remember to ask them during your visits.    For more information or support:    [employer]  [address]  [place], [place]  Phone: 1-672.310.2674  Phone: 1-423.135.2355  Web Address: http://www.jackie.org  [address]84 Suicide and Crisis Lifeline  [address]  [place], [place]  Phone: 1-239-966  Web Address: http://www.suicidepreventionlifeline.org OR https://[facility].org/chat/

## 2023-08-26 NOTE — ED PROVIDER NOTE - PROGRESS NOTE DETAILS
Lab came and collected blood
TRANSFER - IN REPORT:    [facility]  being received from [phone] for ordered procedure      Report consisted of patients Situation, Background, Assessment and   Recommendations(SBAR). Information from the following report(s) SBAR and MAR was reviewed with the receiving nurse. Opportunity for questions and clarification was provided. Assessment to be  completed upon patients arrival to unit and care assumed.
TRANSFER - OUT REPORT:    Verbal [facility] [id number] [facility]  [facility]  for routine post - op       Report consisted of patients Situation, Background, Assessment and   Recommendations(SBAR). Information from the following report(s) OR Summary, Procedure Summary, Intake/Output and MAR was reviewed with the receiving nurse. Lines:   Peripheral IV 02/13/20 Left Antecubital (Active)   Site Assessment Clean, dry, & intact 2/14/2020 10:52 AM   Phlebitis Assessment 0 2/14/2020 10:52 AM   Dressing Status Clean, dry, & intact 2/14/2020 10:52 AM   Dressing Type Transparent 2/14/2020 10:52 AM   Hub Color/Line Status Infusing 2/14/2020 10:52 AM        Opportunity for questions and clarification was provided. Patient transported with:   O2 @ 2 liters    VTE prophylaxis orders have not been written for [address] [place] [address]. Patient and family given floor number and nurses name. Family updated re: pt status after security code verified.
PS: Called [address]. Spoke to staff member but they were unable to obtain any information on what led to pt being sent in. Here, pt calm, cooperative, not agitated. Feels well. Will dc

## 2023-08-26 NOTE — ED ADULT NURSE NOTE - OBJECTIVE STATEMENT
54 y/o male BIBA from [facility] from agitation and banging her head against a wall. Pt calm and cooperative during assessment. Denies SI/HI

## 2023-08-26 NOTE — ED PROVIDER NOTE - ATTENDING CONTRIBUTION TO CARE
I personally evaluated the patient. I reviewed the Resident’s or Physician Assistant’s note (as assigned above), and agree with the findings and plan except as documented in my note.  53-year-old female past medical history of depression, HTN, HLD, DM presents from [address] [place] [address] for evaluation after having behavioral outbursts.  Patient reports that she was upset and started hitting her head on the door.  Denies any LOC.  Currently is asymptomatic.  VSS, patient is currently calm and cooperative, placed on one-to-one observation, non toxic appearing, NAD, Head NCAT, MMM, neck supple, normal ROM, normal s1s2, lungs ctab, abd s/nt/nd, no guarding or rebound, extremities wnl, AAO x 3, GCS 15, neuro grossly normal. No acute skin lesions. Plan is ED observation, consult [facility] and dispo accordingly.

## 2023-08-26 NOTE — ED PROVIDER NOTE - OBJECTIVE STATEMENT
Pt is a 54 y/o female with PMH of HTN, HLD, DM and depression sent in by [employer] for agitation. As per staff, pt was agitated and banging her head against a wall. On arrival, pt calm and cooperative. Says she feels well. Denies any pain. Denies any agitation.

## 2023-08-26 NOTE — ED ADULT NURSE NOTE - CHIEF COMPLAINT QUOTE
Pt BIBA from [address] [place] [address] after pt was banging on the walls, pt calm cooperative and not aggressive in triage. 1;1 initiated in triage for elopement risk

## 2023-08-26 NOTE — ED PROVIDER NOTE - CARE PROVIDER_API CALL
Tesha White  Internal Medicine  [address] [place] [id number]  Phone: [phone]  Fax: [phone]  Follow Up Time: 1-3 Days

## 2023-12-04 NOTE — ED ADULT TRIAGE NOTE - TEMPERATURE IN FAHRENHEIT (DEGREES F)
Writer called pharmacy and confirmed that PA is needed. Started in separate encounter. Message sent back to patient informing him that process was started.     Writer attempted to call patient back. No answer. Left message for patient to call back. Also sent message through patient portal online.    97.6

## 2024-06-04 NOTE — PROGRESS NOTE ADULT - PROBLEM SELECTOR PLAN 1
H&P reviewed. The patient was examined and there are no changes to the H&P.  
continue present treatment as per psych plan as reviewed  Medically stable with no new changes in treatment  will continue to monitor medical status while being treated on psych  cbc stable medically stable for d/c as per psych
cont metformin with bs stable
continue present treatment as per psych plan as reviewed  Medically stable with no new changes in treatment  will continue to monitor medical status while being treated on psych
contiue tx on inuslin and mealtime coverage with pioglitzone  fsbs stable
cotn insulin with oral meds  bs micky but stable
cotn tx for diabetes with stable and improved bs
fsbs remain stable cont tx as ordered
fsbs reviwed and stasble on tx

## 2024-08-23 NOTE — ED ADULT NURSE NOTE - NSICDXNOPASTSURGICALHX_GEN_ALL_CORE
----- Message from Erwin Mahajan MD sent at 8/23/2024  8:20 AM CDT -----  Please notify patient: A1c 6.1 from 6.3%. if he is losing weight, then would continue current dose. If not, then increase Trulicity to 1.5 mg qweekly, recheck A1c in 3 months  
Pt notified of provider's recommendations below.  Pt verbalized understanding and denied having any questions.    Pt states he doesn't feel he is losing any weight and would like the dose increased.    Only call pt with problems  
<-- Click to add NO significant Past Surgical History

## 2025-03-07 NOTE — ED PROVIDER NOTE - AXIS
Admitted for initiation of HD  s/p shiley placement 2/17  Tolerating HD  s/p RIJ permacath placement with IR 2/25  TTE wnl  continue HD through permacath, plan for AVF intervention outpatient  Planning dc to [facility]

## 2025-05-08 ENCOUNTER — INPATIENT (INPATIENT)
Facility: HOSPITAL | Age: 55
LOS: 0 days | Discharge: HOME | DRG: 948 | End: 2025-05-09
Attending: INTERNAL MEDICINE | Admitting: STUDENT IN AN ORGANIZED HEALTH CARE EDUCATION/TRAINING PROGRAM
Payer: MEDICARE

## 2025-05-08 VITALS
RESPIRATION RATE: 18 BRPM | TEMPERATURE: 98 F | SYSTOLIC BLOOD PRESSURE: 121 MMHG | HEART RATE: 105 BPM | DIASTOLIC BLOOD PRESSURE: 54 MMHG | HEIGHT: 67 IN | WEIGHT: 169.98 LBS | OXYGEN SATURATION: 95 %

## 2025-05-08 DIAGNOSIS — R53.1 WEAKNESS: ICD-10-CM

## 2025-05-08 LAB
A1C WITH ESTIMATED AVERAGE GLUCOSE RESULT: 11.3 % — HIGH (ref 4–5.6)
ALBUMIN SERPL ELPH-MCNC: 3.6 G/DL — SIGNIFICANT CHANGE UP (ref 3.5–5.2)
ALP SERPL-CCNC: 92 U/L — SIGNIFICANT CHANGE UP (ref 30–115)
ALT FLD-CCNC: 12 U/L — SIGNIFICANT CHANGE UP (ref 0–41)
ANION GAP SERPL CALC-SCNC: 12 MMOL/L — SIGNIFICANT CHANGE UP (ref 7–14)
APPEARANCE UR: CLEAR — SIGNIFICANT CHANGE UP
AST SERPL-CCNC: 11 U/L — SIGNIFICANT CHANGE UP (ref 0–41)
B-OH-BUTYR SERPL-SCNC: 0.3 MMOL/L — SIGNIFICANT CHANGE UP
BASE EXCESS BLDV CALC-SCNC: 0.6 MMOL/L — SIGNIFICANT CHANGE UP (ref -2–3)
BASOPHILS # BLD AUTO: 0.04 K/UL — SIGNIFICANT CHANGE UP (ref 0–0.2)
BASOPHILS NFR BLD AUTO: 0.5 % — SIGNIFICANT CHANGE UP (ref 0–1)
BILIRUB SERPL-MCNC: <0.2 MG/DL — SIGNIFICANT CHANGE UP (ref 0.2–1.2)
BILIRUB UR-MCNC: NEGATIVE — SIGNIFICANT CHANGE UP
BUN SERPL-MCNC: 25 MG/DL — HIGH (ref 10–20)
CA-I SERPL-SCNC: 1.51 MMOL/L — HIGH (ref 1.15–1.33)
CALCIUM SERPL-MCNC: 10.9 MG/DL — HIGH (ref 8.4–10.5)
CHLORIDE SERPL-SCNC: 101 MMOL/L — SIGNIFICANT CHANGE UP (ref 98–110)
CO2 SERPL-SCNC: 24 MMOL/L — SIGNIFICANT CHANGE UP (ref 17–32)
COLOR SPEC: YELLOW — SIGNIFICANT CHANGE UP
CREAT SERPL-MCNC: 1 MG/DL — SIGNIFICANT CHANGE UP (ref 0.7–1.5)
DIFF PNL FLD: NEGATIVE — SIGNIFICANT CHANGE UP
EGFR: 67 ML/MIN/1.73M2 — SIGNIFICANT CHANGE UP
EGFR: 67 ML/MIN/1.73M2 — SIGNIFICANT CHANGE UP
EOSINOPHIL # BLD AUTO: 0.21 K/UL — SIGNIFICANT CHANGE UP (ref 0–0.7)
EOSINOPHIL NFR BLD AUTO: 2.5 % — SIGNIFICANT CHANGE UP (ref 0–8)
ESTIMATED AVERAGE GLUCOSE: 278 MG/DL — HIGH (ref 68–114)
FLUAV AG NPH QL: SIGNIFICANT CHANGE UP
FLUBV AG NPH QL: SIGNIFICANT CHANGE UP
GAS PNL BLDV: 133 MMOL/L — LOW (ref 136–145)
GAS PNL BLDV: SIGNIFICANT CHANGE UP
GAS PNL BLDV: SIGNIFICANT CHANGE UP
GLUCOSE BLDC GLUCOMTR-MCNC: 168 MG/DL — HIGH (ref 70–99)
GLUCOSE BLDC GLUCOMTR-MCNC: 225 MG/DL — HIGH (ref 70–99)
GLUCOSE BLDC GLUCOMTR-MCNC: 320 MG/DL — HIGH (ref 70–99)
GLUCOSE BLDC GLUCOMTR-MCNC: 323 MG/DL — HIGH (ref 70–99)
GLUCOSE SERPL-MCNC: 475 MG/DL — CRITICAL HIGH (ref 70–99)
GLUCOSE UR QL: >=1000 MG/DL
HCO3 BLDV-SCNC: 27 MMOL/L — SIGNIFICANT CHANGE UP (ref 22–29)
HCT VFR BLD CALC: 40.6 % — SIGNIFICANT CHANGE UP (ref 37–47)
HCT VFR BLDA CALC: 41 % — SIGNIFICANT CHANGE UP (ref 34.5–46.5)
HGB BLD CALC-MCNC: 13.5 G/DL — SIGNIFICANT CHANGE UP (ref 11.7–16.1)
HGB BLD-MCNC: 13 G/DL — SIGNIFICANT CHANGE UP (ref 12–16)
IMM GRANULOCYTES NFR BLD AUTO: 0.7 % — HIGH (ref 0.1–0.3)
KETONES UR-MCNC: 15 MG/DL
LACTATE BLDV-MCNC: 1.4 MMOL/L — SIGNIFICANT CHANGE UP (ref 0.5–2)
LEUKOCYTE ESTERASE UR-ACNC: NEGATIVE — SIGNIFICANT CHANGE UP
LYMPHOCYTES # BLD AUTO: 1.85 K/UL — SIGNIFICANT CHANGE UP (ref 1.2–3.4)
LYMPHOCYTES # BLD AUTO: 21.8 % — SIGNIFICANT CHANGE UP (ref 20.5–51.1)
MAGNESIUM SERPL-MCNC: 2 MG/DL — SIGNIFICANT CHANGE UP (ref 1.8–2.4)
MCHC RBC-ENTMCNC: 30.4 PG — SIGNIFICANT CHANGE UP (ref 27–31)
MCHC RBC-ENTMCNC: 32 G/DL — SIGNIFICANT CHANGE UP (ref 32–37)
MCV RBC AUTO: 94.9 FL — SIGNIFICANT CHANGE UP (ref 81–99)
MONOCYTES # BLD AUTO: 1.17 K/UL — HIGH (ref 0.1–0.6)
MONOCYTES NFR BLD AUTO: 13.8 % — HIGH (ref 1.7–9.3)
NEUTROPHILS # BLD AUTO: 5.17 K/UL — SIGNIFICANT CHANGE UP (ref 1.4–6.5)
NEUTROPHILS NFR BLD AUTO: 60.7 % — SIGNIFICANT CHANGE UP (ref 42.2–75.2)
NITRITE UR-MCNC: NEGATIVE — SIGNIFICANT CHANGE UP
NRBC BLD AUTO-RTO: 0 /100 WBCS — SIGNIFICANT CHANGE UP (ref 0–0)
PCO2 BLDV: 47 MMHG — HIGH (ref 39–42)
PH BLDV: 7.36 — SIGNIFICANT CHANGE UP (ref 7.32–7.43)
PH UR: 5.5 — SIGNIFICANT CHANGE UP (ref 5–8)
PHOSPHATE SERPL-MCNC: 3.7 MG/DL — SIGNIFICANT CHANGE UP (ref 2.1–4.9)
PLATELET # BLD AUTO: 286 K/UL — SIGNIFICANT CHANGE UP (ref 130–400)
PMV BLD: 10 FL — SIGNIFICANT CHANGE UP (ref 7.4–10.4)
PO2 BLDV: 56 MMHG — HIGH (ref 25–45)
POTASSIUM BLDV-SCNC: 4.5 MMOL/L — SIGNIFICANT CHANGE UP (ref 3.5–5.1)
POTASSIUM SERPL-MCNC: 4.7 MMOL/L — SIGNIFICANT CHANGE UP (ref 3.5–5)
POTASSIUM SERPL-SCNC: 4.7 MMOL/L — SIGNIFICANT CHANGE UP (ref 3.5–5)
PROT SERPL-MCNC: 7.2 G/DL — SIGNIFICANT CHANGE UP (ref 6–8)
PROT UR-MCNC: NEGATIVE MG/DL — SIGNIFICANT CHANGE UP
RBC # BLD: 4.28 M/UL — SIGNIFICANT CHANGE UP (ref 4.2–5.4)
RBC # FLD: 13.8 % — SIGNIFICANT CHANGE UP (ref 11.5–14.5)
RSV RNA NPH QL NAA+NON-PROBE: SIGNIFICANT CHANGE UP
SAO2 % BLDV: 85.8 % — SIGNIFICANT CHANGE UP (ref 67–88)
SARS-COV-2 RNA SPEC QL NAA+PROBE: SIGNIFICANT CHANGE UP
SODIUM SERPL-SCNC: 137 MMOL/L — SIGNIFICANT CHANGE UP (ref 135–146)
SOURCE RESPIRATORY: SIGNIFICANT CHANGE UP
SP GR SPEC: >1.03 — HIGH (ref 1–1.03)
UROBILINOGEN FLD QL: 0.2 MG/DL — SIGNIFICANT CHANGE UP (ref 0.2–1)
WBC # BLD: 8.5 K/UL — SIGNIFICANT CHANGE UP (ref 4.8–10.8)
WBC # FLD AUTO: 8.5 K/UL — SIGNIFICANT CHANGE UP (ref 4.8–10.8)

## 2025-05-08 PROCEDURE — 36415 COLL VENOUS BLD VENIPUNCTURE: CPT

## 2025-05-08 PROCEDURE — 85025 COMPLETE CBC W/AUTO DIFF WBC: CPT

## 2025-05-08 PROCEDURE — 99223 1ST HOSP IP/OBS HIGH 75: CPT

## 2025-05-08 PROCEDURE — 82962 GLUCOSE BLOOD TEST: CPT

## 2025-05-08 PROCEDURE — 97530 THERAPEUTIC ACTIVITIES: CPT | Mod: GP

## 2025-05-08 PROCEDURE — 99285 EMERGENCY DEPT VISIT HI MDM: CPT | Mod: FS

## 2025-05-08 PROCEDURE — 93010 ELECTROCARDIOGRAM REPORT: CPT

## 2025-05-08 PROCEDURE — 80053 COMPREHEN METABOLIC PANEL: CPT

## 2025-05-08 PROCEDURE — 83036 HEMOGLOBIN GLYCOSYLATED A1C: CPT

## 2025-05-08 PROCEDURE — 94640 AIRWAY INHALATION TREATMENT: CPT

## 2025-05-08 PROCEDURE — 71045 X-RAY EXAM CHEST 1 VIEW: CPT | Mod: 26

## 2025-05-08 RX ORDER — MELATONIN 5 MG
3 TABLET ORAL AT BEDTIME
Refills: 0 | Status: DISCONTINUED | OUTPATIENT
Start: 2025-05-08 | End: 2025-05-09

## 2025-05-08 RX ORDER — BISACODYL 5 MG
5 TABLET, DELAYED RELEASE (ENTERIC COATED) ORAL EVERY 12 HOURS
Refills: 0 | Status: DISCONTINUED | OUTPATIENT
Start: 2025-05-08 | End: 2025-05-09

## 2025-05-08 RX ORDER — DEXTROSE 50 % IN WATER 50 %
25 SYRINGE (ML) INTRAVENOUS ONCE
Refills: 0 | Status: DISCONTINUED | OUTPATIENT
Start: 2025-05-08 | End: 2025-05-09

## 2025-05-08 RX ORDER — SODIUM CHLORIDE 9 G/1000ML
1500 INJECTION, SOLUTION INTRAVENOUS ONCE
Refills: 0 | Status: COMPLETED | OUTPATIENT
Start: 2025-05-08 | End: 2025-05-08

## 2025-05-08 RX ORDER — ATORVASTATIN CALCIUM 80 MG/1
20 TABLET, FILM COATED ORAL AT BEDTIME
Refills: 0 | Status: DISCONTINUED | OUTPATIENT
Start: 2025-05-08 | End: 2025-05-09

## 2025-05-08 RX ORDER — GLUCAGON 3 MG/1
1 POWDER NASAL ONCE
Refills: 0 | Status: DISCONTINUED | OUTPATIENT
Start: 2025-05-08 | End: 2025-05-09

## 2025-05-08 RX ORDER — BENZTROPINE MESYLATE 2 MG
1 TABLET ORAL DAILY
Refills: 0 | Status: DISCONTINUED | OUTPATIENT
Start: 2025-05-08 | End: 2025-05-09

## 2025-05-08 RX ORDER — ENOXAPARIN SODIUM 100 MG/ML
40 INJECTION SUBCUTANEOUS EVERY 24 HOURS
Refills: 0 | Status: DISCONTINUED | OUTPATIENT
Start: 2025-05-08 | End: 2025-05-09

## 2025-05-08 RX ORDER — SODIUM CHLORIDE 9 G/1000ML
1000 INJECTION, SOLUTION INTRAVENOUS ONCE
Refills: 0 | Status: COMPLETED | OUTPATIENT
Start: 2025-05-08 | End: 2025-05-08

## 2025-05-08 RX ORDER — INSULIN GLARGINE-YFGN 100 [IU]/ML
25 INJECTION, SOLUTION SUBCUTANEOUS AT BEDTIME
Refills: 0 | Status: DISCONTINUED | OUTPATIENT
Start: 2025-05-08 | End: 2025-05-09

## 2025-05-08 RX ORDER — OLANZAPINE 10 MG/1
40 TABLET ORAL AT BEDTIME
Refills: 0 | Status: DISCONTINUED | OUTPATIENT
Start: 2025-05-08 | End: 2025-05-09

## 2025-05-08 RX ORDER — INSULIN GLARGINE-YFGN 100 [IU]/ML
15 INJECTION, SOLUTION SUBCUTANEOUS AT BEDTIME
Refills: 0 | Status: DISCONTINUED | OUTPATIENT
Start: 2025-05-08 | End: 2025-05-08

## 2025-05-08 RX ORDER — ALBUTEROL SULFATE 2.5 MG/3ML
2.5 VIAL, NEBULIZER (ML) INHALATION ONCE
Refills: 0 | Status: COMPLETED | OUTPATIENT
Start: 2025-05-08 | End: 2025-05-08

## 2025-05-08 RX ORDER — SENNA 187 MG
2 TABLET ORAL AT BEDTIME
Refills: 0 | Status: DISCONTINUED | OUTPATIENT
Start: 2025-05-08 | End: 2025-05-09

## 2025-05-08 RX ORDER — ACETAMINOPHEN 500 MG/5ML
650 LIQUID (ML) ORAL EVERY 6 HOURS
Refills: 0 | Status: DISCONTINUED | OUTPATIENT
Start: 2025-05-08 | End: 2025-05-09

## 2025-05-08 RX ORDER — DEXTROSE 50 % IN WATER 50 %
12.5 SYRINGE (ML) INTRAVENOUS ONCE
Refills: 0 | Status: DISCONTINUED | OUTPATIENT
Start: 2025-05-08 | End: 2025-05-09

## 2025-05-08 RX ORDER — INSULIN LISPRO 100 U/ML
9 INJECTION, SOLUTION INTRAVENOUS; SUBCUTANEOUS
Refills: 0 | Status: DISCONTINUED | OUTPATIENT
Start: 2025-05-08 | End: 2025-05-09

## 2025-05-08 RX ORDER — INSULIN LISPRO 100 U/ML
INJECTION, SOLUTION INTRAVENOUS; SUBCUTANEOUS
Refills: 0 | Status: DISCONTINUED | OUTPATIENT
Start: 2025-05-08 | End: 2025-05-09

## 2025-05-08 RX ORDER — VANCOMYCIN HCL IN 5 % DEXTROSE 1.5G/250ML
1000 PLASTIC BAG, INJECTION (ML) INTRAVENOUS ONCE
Refills: 0 | Status: COMPLETED | OUTPATIENT
Start: 2025-05-08 | End: 2025-05-08

## 2025-05-08 RX ORDER — AZTREONAM 2 G/1
2000 INJECTION, POWDER, LYOPHILIZED, FOR SOLUTION INTRAMUSCULAR; INTRAVENOUS ONCE
Refills: 0 | Status: COMPLETED | OUTPATIENT
Start: 2025-05-08 | End: 2025-05-08

## 2025-05-08 RX ORDER — ACETAMINOPHEN 500 MG/5ML
975 LIQUID (ML) ORAL ONCE
Refills: 0 | Status: COMPLETED | OUTPATIENT
Start: 2025-05-08 | End: 2025-05-08

## 2025-05-08 RX ORDER — SODIUM CHLORIDE 9 G/1000ML
1000 INJECTION, SOLUTION INTRAVENOUS
Refills: 0 | Status: DISCONTINUED | OUTPATIENT
Start: 2025-05-08 | End: 2025-05-09

## 2025-05-08 RX ORDER — DEXTROSE 50 % IN WATER 50 %
15 SYRINGE (ML) INTRAVENOUS ONCE
Refills: 0 | Status: DISCONTINUED | OUTPATIENT
Start: 2025-05-08 | End: 2025-05-09

## 2025-05-08 RX ORDER — INSULIN LISPRO 100 U/ML
5 INJECTION, SOLUTION INTRAVENOUS; SUBCUTANEOUS
Refills: 0 | Status: DISCONTINUED | OUTPATIENT
Start: 2025-05-08 | End: 2025-05-08

## 2025-05-08 RX ORDER — LOSARTAN POTASSIUM 100 MG/1
25 TABLET, FILM COATED ORAL DAILY
Refills: 0 | Status: DISCONTINUED | OUTPATIENT
Start: 2025-05-08 | End: 2025-05-09

## 2025-05-08 RX ADMIN — Medication 2.5 MILLIGRAM(S): at 01:26

## 2025-05-08 RX ADMIN — Medication 250 MILLIGRAM(S): at 22:48

## 2025-05-08 RX ADMIN — INSULIN LISPRO 8: 100 INJECTION, SOLUTION INTRAVENOUS; SUBCUTANEOUS at 11:17

## 2025-05-08 RX ADMIN — Medication 2000 UNIT(S): at 11:18

## 2025-05-08 RX ADMIN — SODIUM CHLORIDE 1000 MILLILITER(S): 9 INJECTION, SOLUTION INTRAVENOUS at 02:26

## 2025-05-08 RX ADMIN — SODIUM CHLORIDE 125 MILLILITER(S): 9 INJECTION, SOLUTION INTRAVENOUS at 12:16

## 2025-05-08 RX ADMIN — INSULIN LISPRO 2: 100 INJECTION, SOLUTION INTRAVENOUS; SUBCUTANEOUS at 17:40

## 2025-05-08 RX ADMIN — SODIUM CHLORIDE 1500 MILLILITER(S): 9 INJECTION, SOLUTION INTRAVENOUS at 02:04

## 2025-05-08 RX ADMIN — Medication 250 MILLIGRAM(S): at 02:04

## 2025-05-08 RX ADMIN — Medication 10 UNIT(S): at 03:30

## 2025-05-08 RX ADMIN — ATORVASTATIN CALCIUM 20 MILLIGRAM(S): 80 TABLET, FILM COATED ORAL at 21:22

## 2025-05-08 RX ADMIN — INSULIN GLARGINE-YFGN 25 UNIT(S): 100 INJECTION, SOLUTION SUBCUTANEOUS at 22:48

## 2025-05-08 RX ADMIN — Medication 2 TABLET(S): at 21:22

## 2025-05-08 RX ADMIN — INSULIN LISPRO 5 UNIT(S): 100 INJECTION, SOLUTION INTRAVENOUS; SUBCUTANEOUS at 11:17

## 2025-05-08 RX ADMIN — Medication 1000 MILLIGRAM(S): at 09:12

## 2025-05-08 RX ADMIN — INSULIN LISPRO 5 UNIT(S): 100 INJECTION, SOLUTION INTRAVENOUS; SUBCUTANEOUS at 08:01

## 2025-05-08 RX ADMIN — OLANZAPINE 40 MILLIGRAM(S): 10 TABLET ORAL at 21:22

## 2025-05-08 RX ADMIN — ENOXAPARIN SODIUM 40 MILLIGRAM(S): 100 INJECTION SUBCUTANEOUS at 17:39

## 2025-05-08 RX ADMIN — Medication 1 MILLIGRAM(S): at 11:18

## 2025-05-08 RX ADMIN — Medication 1000 MILLIGRAM(S): at 21:22

## 2025-05-08 RX ADMIN — AZTREONAM 50 MILLIGRAM(S): 2 INJECTION, POWDER, LYOPHILIZED, FOR SOLUTION INTRAMUSCULAR; INTRAVENOUS at 02:04

## 2025-05-08 RX ADMIN — SODIUM CHLORIDE 1000 MILLILITER(S): 9 INJECTION, SOLUTION INTRAVENOUS at 01:26

## 2025-05-08 RX ADMIN — SODIUM CHLORIDE 1500 MILLILITER(S): 9 INJECTION, SOLUTION INTRAVENOUS at 03:04

## 2025-05-08 RX ADMIN — INSULIN LISPRO 9 UNIT(S): 100 INJECTION, SOLUTION INTRAVENOUS; SUBCUTANEOUS at 17:40

## 2025-05-08 RX ADMIN — INSULIN LISPRO 8: 100 INJECTION, SOLUTION INTRAVENOUS; SUBCUTANEOUS at 08:00

## 2025-05-08 NOTE — ED ADULT NURSE NOTE - OBJECTIVE STATEMENT
pt c/o of high blood sugar. Pt BIBEMS from [facility]. Pt states staff at her facility sent her in bc her blood sugar was high. Pt states she feels very thirsty, weak and cold. Pt denies any urinary symptoms. As per EMS FS was 533 in the field and in triage FS was 454. Pt states she is a diabetic and takes Jenuvia.

## 2025-05-08 NOTE — ED ADULT TRIAGE NOTE - NSWEIGHTCALCTOOLDRUG_GEN_A_CORE
Quality 410: Psoriasis Clinical Response To Oral Systemic Or Biologic Medications: Psoriasis Assessment Tool Documented, Not Meeting Specified Benchmark Quality 176: Tuberculosis Screening Prior To First Course Of Biologic And/Or Immune Response Modifier Therapy: Patient receiving first-time biologic and/or immune response modifier therapy, TB Screening Performed and Results Interpreted within 12 months Detail Level: Generalized  used

## 2025-05-08 NOTE — ED PROVIDER NOTE - ATTENDING APP SHARED VISIT CONTRIBUTION OF CARE
I have personally performed a history and physical exam on this patient. I have personally directed the management of the patient.  Patient presents to ED c/o generalized weakness and high blood sugars. No trauma.   Vitals reviewed.   Patient is awake, alert, answering questions appropriately, appears comfortable and not in any distress.  Lungs: CTA, no wheezing, no crackles.

## 2025-05-08 NOTE — H&P ADULT - HISTORY OF PRESENT ILLNESS
56yo F w/a PMH of T2DM, insomnia, schizophrenia presents with complaint of elevated blood sugar  and generalized weakness.  Patient was brought in by EMS from [facility] due to blood sugar between 400-500.  States for the past 1 to 2 hours prior to arrival in emergency department she has had a productive cough.  Reports feeling weak.  Denies fever, chills, chest pain, shortness of breath, nausea/vomiting/diarrhea, focal numbness/weakness, sick contacts.     In the ED,   Vitals: /54, , RR 18, T 98.4, satting 95% on RA   Labs: WBC 8.5, Hgb 13, , Na 137, K 4.7, BUN 25, Cr 1, Glucose 475. BHB negative, UA negative, RVP negative   Imaging: CXR - No acute cardiopulmonary pathology     Given Abx and IVF in ed

## 2025-05-08 NOTE — ED PROVIDER NOTE - CARE PLAN
Principal Discharge DX:	Generalized weakness  Secondary Diagnosis:	Hyperglycemia  Secondary Diagnosis:	Flu-like symptoms   1

## 2025-05-08 NOTE — H&P ADULT - NSHPPHYSICALEXAM_GEN_ALL_CORE
PHYSICAL EXAM:  GENERAL: Drowsy  HEAD:  Atraumatic, Normocephalic  EYES: EOMI, PERRLA, anicteric sclera  NECK: Supple, No JVD  CHEST/LUNG: Clear to auscultation bilaterally; No wheeze; No crackles; No accessory muscles used  HEART: Regular rate and rhythm; No murmurs;   ABDOMEN: Soft, Nontender, Nondistended; Bowel sounds present; No guarding  EXTREMITIES:  2+ Peripheral Pulses, No cyanosis or edema. bruises on bilateral arms   PSYCH: AAOx3  NEUROLOGY: non-focal  SKIN: Hyperpigmentation noted on back of neck

## 2025-05-08 NOTE — PHYSICAL THERAPY INITIAL EVALUATION ADULT - GENERAL OBSERVATIONS, REHAB EVAL
[id number] Pt attempted for PT IE, deferring at this time, requesting to sleep. Pt educated on importance of OOB mobility, POC and role of PT, able to provide prior level of function. Pt A&Ox4, reports ambulating independently without assistive device, no hx of falls, resides at [facility]. Pt able to briefly demonstrate AROM BLE while R sidelying on stretcher, deferred repositioning. PT to f/u at next available session.

## 2025-05-08 NOTE — ED ADULT NURSE NOTE - NSFALLHARMRISKINTERV_ED_ALL_ED
Assistance OOB with selected safe patient handling equipment if applicable/Assistance with ambulation/Communicate risk of Fall with Harm to all staff, patient, and family/Encourage patient to sit up slowly, dangle for a short time, stand at bedside before walking/Orthostatic vital signs/Provide visual cue: red socks, yellow wristband, yellow gown, etc/Reinforce activity limits and safety measures with patient and family/Review medications for side effects contributing to fall risk/Toileting schedule using arm’s reach rule for commode and bathroom/Bed in lowest position, wheels locked, appropriate side rails in place/Call bell, personal items and telephone in reach/Instruct patient to call for assistance before getting out of bed/chair/stretcher/Non-slip footwear applied when patient is off stretcher/[place] to call system/Physically safe environment - no spills, clutter or unnecessary equipment/Purposeful Proactive Rounding/Room/bathroom lighting operational, light cord in reach

## 2025-05-08 NOTE — ED PROVIDER NOTE - OBJECTIVE STATEMENT
55-year-old female with past medical history of T2DM, insomnia, schizophrenia presents with complaint of elevated blood sugar  and generalized weakness.  Patient was brought in by EMS from [facility] due to blood sugar between 400-500.  States for the past 1 to 2 hours prior to arrival in emergency department she has had a dry cough.  Reports feeling weak.  Denies fever, chills, chest pain, shortness of breath, nausea/vomiting/diarrhea, focal numbness/weakness.

## 2025-05-08 NOTE — PATIENT PROFILE ADULT - FALL HARM RISK - HARM RISK INTERVENTIONS

## 2025-05-08 NOTE — H&P ADULT - TIME BILLING
Direct clinical care, patient counseling,  coordination with nursing and case management/social work teams, review of tests and scheduled medications,  independent review of previous medical records and available test results, and resident supervision. Time spent on the encounter excludes teaching time and/or separately reported services.

## 2025-05-08 NOTE — H&P ADULT - ASSESSMENT
56yo F w/a PMH of T2DM, insomnia, schizophrenia presents with complaint of elevated blood sugar  and generalized weakness. Admitted for hyperglycemia     #Hyperglycemia 2/2 to suspected Viral URI   - Pt presented w/ FS in 450's   - Has new productive cough and URI sx   - Limited RVP and CXR negative. UA negative   - S/p IVF and abx in ED   Plan   - Supportive care. No need for expanded RVP as would not    - Lantus, Lispro, ISS   - A1c   - LR at 125 for 12 hours   - Monitor FS, keep b/w 140 - 180   - Consider Endo if no improvement or if A1C > 10.      #Schizophrenia   - C/w Zyprexa at night time (monitor qtc)   - C/w Benztropine and Depakote   - on Fluphenazine every 2 weeks     #HTN - C/w Losartan   #HLD - c/w statin     #HANDOFF  #HCM  - DVT Prophylaxis: Lovenox  - GI Prophylaxis: NI   - Diet: Diet, Regular  - Activity: Activity - Ambulate as Tolerated  - Code Status: Full   - Dispo: Monitor FS, A1C, supportive care     Elver Nickerson   PGY3, Internal Medicine   [facility]   54yo F w/a PMH of T2DM, insomnia, schizophrenia presents with complaint of elevated blood sugar  and generalized weakness. Admitted for hyperglycemia     #Hyperglycemia in setting of suspected Viral URI   #DM   - Pt presented w/ FS in 450's   - Has new productive cough and URI sx   - Limited RVP and CXR negative. UA negative   - S/p IVF and abx in ED   Plan   - Supportive care. No need for expanded RVP as would not    - Lantus, Lispro, ISS. Hold Januvia and Metformin   - A1c   - LR at 125 for 12 hours   - Monitor FS, keep b/w 140 - 180   - Consider Endo if no improvement or if A1C > 10.      #Schizophrenia   - C/w Zyprexa at night time (monitor qtc)   - C/w Benztropine and Depakote   - on Fluphenazine every 2 weeks     #HTN - C/w Losartan   #HLD - c/w statin     #HANDOFF  #HCM  - DVT Prophylaxis: Lovenox  - GI Prophylaxis: NI   - Diet: Diet, Regular  - Activity: Activity - Ambulate as Tolerated  - Code Status: Full   - Dispo: Monitor FS, A1C, supportive care     Elver Nickerson   PGY3, Internal Medicine   [facility]

## 2025-05-08 NOTE — ED PROVIDER NOTE - DIFFERENTIAL DIAGNOSIS
Differential Diagnosis Electrolyte abnormalities, dehydration, CHRISTINE, hyperglycemia, hypoglycemia, symptomatic anemia.  r/o pneumonia; r/o cardiac arrhythmia.

## 2025-05-08 NOTE — ED PROVIDER NOTE - PHYSICAL EXAMINATION
Vital Signs: I have reviewed the initial vital signs.  Constitutional: appears stated age, no acute distress  Eyes: Sclera clear, EOMI.  Cardiovascular: S1 and S2, regular rate, regular rhythm, well-perfused extremities, radial pulses equal and 2+, pedal pulses 2+ and equal  Respiratory: bilateral mild rhonchi  Gastrointestinal:  abdomen soft, non-tender  Musculoskeletal: supple neck, no lower extremity edema  Integumentary: warm, dry  Neurologic: awake, alert, oriented x3, extremities’ motor and sensory functions grossly intact

## 2025-05-08 NOTE — H&P ADULT - NSHPLABSRESULTS_GEN_ALL_CORE
.  LABS:                         13.0   8.50  )-----------( 286      ( 08 May 2025 01:54 )             40.6     05-08    137  |  101  |  25[H]  ----------------------------<  475[HH]  4.7   |  24  |  1.0    Ca    10.9[H]      08 May 2025 01:54  Phos  3.7     05-08  Mg     2.0     05-08    TPro  7.2  /  Alb  3.6  /  TBili  <0.2  /  DBili  x   /  AST  11  /  ALT  12  /  AlkPhos  92  05-08      Urinalysis Basic - ( 08 May 2025 03:25 )    Color: Yellow / Appearance: Clear / SG: >1.030 / pH: x  Gluc: x / Ketone: 15 mg/dL  / Bili: Negative / Urobili: 0.2 mg/dL   Blood: x / Protein: Negative mg/dL / Nitrite: Negative   Leuk Esterase: Negative / RBC: x / WBC x   Sq Epi: x / Non Sq Epi: x / Bacteria: x            RADIOLOGY, EKG & ADDITIONAL TESTS: Reviewed.

## 2025-05-08 NOTE — H&P ADULT - ATTENDING COMMENTS
Patient seen and examined independently with POCUS team at bedside   Patient is a poor historian   In no apparent distress or pain, sleepy      Vitals:  T(F): 97 (05-08-25 @ 07:52)  HR: 81 (05-08-25 @ 07:52)  BP: 154/82 (05-08-25 @ 07:52)  RR: 18 (05-08-25 @ 07:52)  SpO2: 95% (05-08-25 @ 07:52)    TESTS & MEASUREMENTS:                        13.0   8.50  )-----------( 286      ( 08 May 2025 01:54 )             40.6       05-08    137  |  101  |  25[H]  ----------------------------<  475[HH]  4.7   |  24  |  1.0    Ca    10.9[H]      08 May 2025 01:54  Phos  3.7     05-08  Mg     2.0     05-08    TPro  7.2  /  Alb  3.6  /  TBili  <0.2  /  DBili  x   /  AST  11  /  ALT  12  /  AlkPhos  92  05-08    LIVER FUNCTIONS - ( 08 May 2025 01:54 )  Alb: 3.6 g/dL / Pro: 7.2 g/dL / ALK PHOS: 92 U/L / ALT: 12 U/L / AST: 11 U/L / GGT: x             In summary:  HEALTH ISSUES - PROBLEM Dx:    - Uncontrolled hyperglycemia in the setting of Type-2 diabetes mellitus, possibly due to ongoing viral URI.         - patient describing coughing and viral prodromes over the past week         - no evidence of DKA at this time        - elevated ammonia/BUN in favor of prerenal azotemia in setting of hyperosmolar diuresis / at risk for CHRISTINE   - Type-2 diabetes mellitus, with stigma of poor control chronically (cutaneous sign of insulin resistance and significant glycosuria), no HbA1c available at baseline at this time   - Mental and behavioral disorder (schizophrenia), P resident , flat affect without evidence of active psychosis or agitation at this time   - HTN on ARB as outpatient   - Rest of issues as per detailed note above     PLAN   - Insulin therapy - hold MF and Januvia for now   - intravenous fluid and Follow up UO / BUN / Crea (at risk for CHRISTINE)   - no indication for antibiotics at this time   - HbA1c - in order to gauge anti-glycemic therapy upon transition to outpatient     Case discussed with resident assigned.

## 2025-05-08 NOTE — ED ADULT NURSE NOTE - IN THE PAST 12 MONTHS HAVE YOU USED DRUGS OTHER THAN THOSE REQUIRED FOR MEDICAL REASON?
Patient: Kate Chacon    Procedure(s):  COLONOSCOPY, WITH LESION EXCISION USING HOT BIOPSY DEVICE    Diagnosis: History of colon polyps [Z86.010]  Diagnosis Additional Information: No value filed.    Anesthesia Type:   MAC     Note:  Airway :Room Air  Patient transferred to:Phase II  Handoff Report: Identifed the Patient, Identified the Reponsible Provider, Reviewed the pertinent medical history, Discussed the surgical course, Reviewed Intra-OP anesthesia mangement and issues during anesthesia, Set expectations for post-procedure period and Allowed opportunity for questions and acknowledgement of understanding      Vitals: (Last set prior to Anesthesia Care Transfer)    CRNA VITALS  12/2/2019 0821 - 12/2/2019 0856      12/2/2019             Resp Rate (set):  10                Electronically Signed By: David Kellerman, APRN CRNA  December 2, 2019  8:56 AM   No

## 2025-05-08 NOTE — ED PROVIDER NOTE - EKG/XRAY ADDITIONAL INFORMATION
EKG shows sinus tachycardia, HR is 102; no STEMI.   Chest X-rays reviewed and interpreted by me Dr. Wood and shows no actue findings. No Pneumothorax, no free air, no effusions, and these findings discussed with patient.

## 2025-05-09 ENCOUNTER — TRANSCRIPTION ENCOUNTER (OUTPATIENT)
Age: 55
End: 2025-05-09

## 2025-05-09 VITALS
TEMPERATURE: 98 F | DIASTOLIC BLOOD PRESSURE: 85 MMHG | HEART RATE: 85 BPM | SYSTOLIC BLOOD PRESSURE: 128 MMHG | RESPIRATION RATE: 18 BRPM | OXYGEN SATURATION: 98 %

## 2025-05-09 LAB
ACANTHOCYTES BLD QL SMEAR: SLIGHT — SIGNIFICANT CHANGE UP
ALBUMIN SERPL ELPH-MCNC: 3.2 G/DL — LOW (ref 3.5–5.2)
ALP SERPL-CCNC: 72 U/L — SIGNIFICANT CHANGE UP (ref 30–115)
ALT FLD-CCNC: 11 U/L — SIGNIFICANT CHANGE UP (ref 0–41)
ANION GAP SERPL CALC-SCNC: 9 MMOL/L — SIGNIFICANT CHANGE UP (ref 7–14)
AST SERPL-CCNC: 17 U/L — SIGNIFICANT CHANGE UP (ref 0–41)
BASOPHILS # BLD AUTO: 0.07 K/UL — SIGNIFICANT CHANGE UP (ref 0–0.2)
BASOPHILS NFR BLD AUTO: 0.9 % — SIGNIFICANT CHANGE UP (ref 0–1)
BILIRUB SERPL-MCNC: <0.2 MG/DL — SIGNIFICANT CHANGE UP (ref 0.2–1.2)
BUN SERPL-MCNC: 18 MG/DL — SIGNIFICANT CHANGE UP (ref 10–20)
CALCIUM SERPL-MCNC: 9.7 MG/DL — SIGNIFICANT CHANGE UP (ref 8.4–10.5)
CHLORIDE SERPL-SCNC: 105 MMOL/L — SIGNIFICANT CHANGE UP (ref 98–110)
CO2 SERPL-SCNC: 27 MMOL/L — SIGNIFICANT CHANGE UP (ref 17–32)
CREAT SERPL-MCNC: 0.8 MG/DL — SIGNIFICANT CHANGE UP (ref 0.7–1.5)
CULTURE RESULTS: SIGNIFICANT CHANGE UP
EGFR: 87 ML/MIN/1.73M2 — SIGNIFICANT CHANGE UP
EGFR: 87 ML/MIN/1.73M2 — SIGNIFICANT CHANGE UP
EOSINOPHIL # BLD AUTO: 0.38 K/UL — SIGNIFICANT CHANGE UP (ref 0–0.7)
EOSINOPHIL NFR BLD AUTO: 5.2 % — SIGNIFICANT CHANGE UP (ref 0–8)
GIANT PLATELETS BLD QL SMEAR: PRESENT — SIGNIFICANT CHANGE UP
GLUCOSE BLDC GLUCOMTR-MCNC: 176 MG/DL — HIGH (ref 70–99)
GLUCOSE BLDC GLUCOMTR-MCNC: 190 MG/DL — HIGH (ref 70–99)
GLUCOSE SERPL-MCNC: 188 MG/DL — HIGH (ref 70–99)
HCT VFR BLD CALC: 37.6 % — SIGNIFICANT CHANGE UP (ref 37–47)
HGB BLD-MCNC: 12 G/DL — SIGNIFICANT CHANGE UP (ref 12–16)
LYMPHOCYTES # BLD AUTO: 1.47 K/UL — SIGNIFICANT CHANGE UP (ref 1.2–3.4)
LYMPHOCYTES # BLD AUTO: 20.2 % — LOW (ref 20.5–51.1)
MANUAL SMEAR VERIFICATION: SIGNIFICANT CHANGE UP
MCHC RBC-ENTMCNC: 30.5 PG — SIGNIFICANT CHANGE UP (ref 27–31)
MCHC RBC-ENTMCNC: 31.9 G/DL — LOW (ref 32–37)
MCV RBC AUTO: 95.4 FL — SIGNIFICANT CHANGE UP (ref 81–99)
MONOCYTES # BLD AUTO: 0.45 K/UL — SIGNIFICANT CHANGE UP (ref 0.1–0.6)
MONOCYTES NFR BLD AUTO: 6.1 % — SIGNIFICANT CHANGE UP (ref 1.7–9.3)
NEUTROPHILS # BLD AUTO: 4.29 K/UL — SIGNIFICANT CHANGE UP (ref 1.4–6.5)
NEUTROPHILS NFR BLD AUTO: 58.8 % — SIGNIFICANT CHANGE UP (ref 42.2–75.2)
PLAT MORPH BLD: NORMAL — SIGNIFICANT CHANGE UP
PLATELET # BLD AUTO: 250 K/UL — SIGNIFICANT CHANGE UP (ref 130–400)
PMV BLD: 9.9 FL — SIGNIFICANT CHANGE UP (ref 7.4–10.4)
POIKILOCYTOSIS BLD QL AUTO: SLIGHT — SIGNIFICANT CHANGE UP
POTASSIUM SERPL-MCNC: 4.4 MMOL/L — SIGNIFICANT CHANGE UP (ref 3.5–5)
POTASSIUM SERPL-SCNC: 4.4 MMOL/L — SIGNIFICANT CHANGE UP (ref 3.5–5)
PROT SERPL-MCNC: 5.8 G/DL — LOW (ref 6–8)
RBC # BLD: 3.94 M/UL — LOW (ref 4.2–5.4)
RBC # FLD: 13.8 % — SIGNIFICANT CHANGE UP (ref 11.5–14.5)
RBC BLD AUTO: ABNORMAL
SMUDGE CELLS # BLD: PRESENT — SIGNIFICANT CHANGE UP
SODIUM SERPL-SCNC: 141 MMOL/L — SIGNIFICANT CHANGE UP (ref 135–146)
SPECIMEN SOURCE: SIGNIFICANT CHANGE UP
VARIANT LYMPHS # BLD: 8.8 % — HIGH (ref 0–5)
VARIANT LYMPHS NFR BLD MANUAL: 8.8 % — HIGH (ref 0–5)
WBC # BLD: 7.3 K/UL — SIGNIFICANT CHANGE UP (ref 4.8–10.8)
WBC # FLD AUTO: 7.3 K/UL — SIGNIFICANT CHANGE UP (ref 4.8–10.8)

## 2025-05-09 PROCEDURE — 99239 HOSP IP/OBS DSCHRG MGMT >30: CPT

## 2025-05-09 RX ORDER — DEXTROMETHORPHAN HBR, GUAIFENESIN 200 MG/10ML
100 LIQUID ORAL EVERY 6 HOURS
Refills: 0 | Status: DISCONTINUED | OUTPATIENT
Start: 2025-05-09 | End: 2025-05-09

## 2025-05-09 RX ORDER — TIOTROPIUM BROMIDE INHALATION SPRAY 3.12 UG/1
1 SPRAY, METERED RESPIRATORY (INHALATION)
Qty: 1 | Refills: 0
Start: 2025-05-09

## 2025-05-09 RX ORDER — ALBUTEROL SULFATE 2.5 MG/3ML
2 VIAL, NEBULIZER (ML) INHALATION
Qty: 1 | Refills: 0
Start: 2025-05-09

## 2025-05-09 RX ORDER — INSULIN GLARGINE-YFGN 100 [IU]/ML
25 INJECTION, SOLUTION SUBCUTANEOUS
Qty: 1 | Refills: 0
Start: 2025-05-09 | End: 2025-06-07

## 2025-05-09 RX ORDER — BUDESONIDE AND FORMOTEROL FUMARATE DIHYDRATE 80; 4.5 UG/1; UG/1
2 AEROSOL RESPIRATORY (INHALATION)
Qty: 1 | Refills: 0
Start: 2025-05-09

## 2025-05-09 RX ORDER — IPRATROPIUM BROMIDE AND ALBUTEROL SULFATE .5; 2.5 MG/3ML; MG/3ML
3 SOLUTION RESPIRATORY (INHALATION) ONCE
Refills: 0 | Status: COMPLETED | OUTPATIENT
Start: 2025-05-09 | End: 2025-05-09

## 2025-05-09 RX ORDER — DEXTROMETHORPHAN HBR, GUAIFENESIN 200 MG/10ML
5 LIQUID ORAL
Qty: 100 | Refills: 0
Start: 2025-05-09 | End: 2025-05-13

## 2025-05-09 RX ADMIN — INSULIN LISPRO 2: 100 INJECTION, SOLUTION INTRAVENOUS; SUBCUTANEOUS at 08:33

## 2025-05-09 RX ADMIN — DEXTROMETHORPHAN HBR, GUAIFENESIN 100 MILLIGRAM(S): 200 LIQUID ORAL at 13:38

## 2025-05-09 RX ADMIN — Medication 1 APPLICATION(S): at 12:00

## 2025-05-09 RX ADMIN — INSULIN LISPRO 9 UNIT(S): 100 INJECTION, SOLUTION INTRAVENOUS; SUBCUTANEOUS at 08:33

## 2025-05-09 RX ADMIN — INSULIN LISPRO 2: 100 INJECTION, SOLUTION INTRAVENOUS; SUBCUTANEOUS at 12:01

## 2025-05-09 RX ADMIN — INSULIN LISPRO 9 UNIT(S): 100 INJECTION, SOLUTION INTRAVENOUS; SUBCUTANEOUS at 12:02

## 2025-05-09 RX ADMIN — IPRATROPIUM BROMIDE AND ALBUTEROL SULFATE 3 MILLILITER(S): .5; 2.5 SOLUTION RESPIRATORY (INHALATION) at 12:36

## 2025-05-09 RX ADMIN — Medication 1 MILLIGRAM(S): at 11:59

## 2025-05-09 RX ADMIN — Medication 1000 MILLIGRAM(S): at 08:31

## 2025-05-09 RX ADMIN — LOSARTAN POTASSIUM 25 MILLIGRAM(S): 100 TABLET, FILM COATED ORAL at 05:12

## 2025-05-09 RX ADMIN — Medication 2000 UNIT(S): at 11:59

## 2025-05-09 NOTE — DISCHARGE NOTE PROVIDER - NSDCMRMEDTOKEN_GEN_ALL_CORE_FT
atorvastatin 20 mg oral tablet: 1 tab(s) orally once a day (at bedtime)  benztropine 1 mg oral tablet: 1 tab(s) orally once a day  bisacodyl 5 mg oral tablet: 1 tab(s) orally once a day as needed for  constipation  cetirizine 10 mg oral tablet: 1 tab(s) orally once a day as needed for  allergy symptoms  cholecalciferol 50 mcg (2000 intl units) oral tablet: 1 tab(s) orally once a day  divalproex sodium 250 mg oral tablet, extended release: 1 tab(s) orally once a day (at bedtime)  divalproex sodium 500 mg oral tablet, extended release: 2 tab(s) orally 2 times a day  docusate sodium 100 mg oral capsule: 1 cap(s) orally once a day  fluPHENAZine decanoate 25 mg/mL injectable solution: 50 milligram(s) intramuscularly every 2 weeks  Januvia 100 mg oral tablet: 1 tab(s) orally once a day  losartan 25 mg oral tablet: 1 tab(s) orally once a day  metFORMIN 1000 mg oral tablet: 1 tab(s) orally once a day  OLANZapine 20 mg oral tablet: 2 tab(s) orally once a day (at bedtime)   atorvastatin 20 mg oral tablet: 1 tab(s) orally once a day (at bedtime)  benztropine 1 mg oral tablet: 1 tab(s) orally once a day  bisacodyl 5 mg oral tablet: 1 tab(s) orally once a day as needed for  constipation  cetirizine 10 mg oral tablet: 1 tab(s) orally once a day as needed for  allergy symptoms  cholecalciferol 50 mcg (2000 intl units) oral tablet: 1 tab(s) orally once a day  divalproex sodium 250 mg oral tablet, extended release: 1 tab(s) orally once a day (at bedtime)  divalproex sodium 500 mg oral tablet, extended release: 2 tab(s) orally 2 times a day  docusate sodium 100 mg oral capsule: 1 cap(s) orally once a day  fluPHENAZine decanoate 25 mg/mL injectable solution: 50 milligram(s) intramuscularly every 2 weeks  Insulin Glargine Max Solostar Pen (concentrated) 300 units/mL subcutaneous solution: 25 unit(s) subcutaneous once a day (at bedtime)  Januvia 100 mg oral tablet: 1 tab(s) orally once a day  losartan 25 mg oral tablet: 1 tab(s) orally once a day  OLANZapine 20 mg oral tablet: 2 tab(s) orally once a day (at bedtime)   Albuterol (Eqv-ProAir HFA) 90 mcg/inh inhalation aerosol: 2 puff(s) inhaled 4 times a day  atorvastatin 20 mg oral tablet: 1 tab(s) orally once a day (at bedtime)  benztropine 1 mg oral tablet: 1 tab(s) orally once a day  bisacodyl 5 mg oral tablet: 1 tab(s) orally once a day as needed for  constipation  cetirizine 10 mg oral tablet: 1 tab(s) orally once a day as needed for  allergy symptoms  cholecalciferol 50 mcg (2000 intl units) oral tablet: 1 tab(s) orally once a day  divalproex sodium 250 mg oral tablet, extended release: 1 tab(s) orally once a day (at bedtime)  divalproex sodium 500 mg oral tablet, extended release: 2 tab(s) orally 2 times a day  docusate sodium 100 mg oral capsule: 1 cap(s) orally once a day  fluPHENAZine decanoate 25 mg/mL injectable solution: 50 milligram(s) intramuscularly every 2 weeks  guaiFENesin 100 mg/5 mL oral liquid: 5 milliliter(s) orally every 6 hours as needed for Cough  Insulin Glargine Max Solostar Pen (concentrated) 300 units/mL subcutaneous solution: 25 unit(s) subcutaneous once a day (at bedtime)  Januvia 100 mg oral tablet: 1 tab(s) orally once a day  lancets: 1 application subcutaneously 4 times a day  losartan 25 mg oral tablet: 1 tab(s) orally once a day  OLANZapine 20 mg oral tablet: 2 tab(s) orally once a day (at bedtime)  Spiriva HandiHaler 18 mcg inhalation capsule: 1 cap(s) inhaled once a day  Symbicort 160 mcg-4.5 mcg/inh inhalation aerosol: 2 puff(s) inhaled 2 times a day  test strips (per patient&#x27;s insurance): 1 application subcutaneously 4 times a day. ** Compatible with patient&#x27;s glucometer **   Albuterol (Eqv-ProAir HFA) 90 mcg/inh inhalation aerosol: 2 puff(s) inhaled 4 times a day  atorvastatin 20 mg oral tablet: 1 tab(s) orally once a day (at bedtime)  benztropine 1 mg oral tablet: 1 tab(s) orally once a day  bisacodyl 5 mg oral tablet: 1 tab(s) orally once a day as needed for  constipation  cetirizine 10 mg oral tablet: 1 tab(s) orally once a day as needed for  allergy symptoms  cholecalciferol 50 mcg (2000 intl units) oral tablet: 1 tab(s) orally once a day  divalproex sodium 250 mg oral tablet, extended release: 1 tab(s) orally once a day (at bedtime)  divalproex sodium 500 mg oral tablet, extended release: 2 tab(s) orally 2 times a day  docusate sodium 100 mg oral capsule: 1 cap(s) orally once a day  fluPHENAZine decanoate 25 mg/mL injectable solution: 50 milligram(s) intramuscularly every 2 weeks  guaiFENesin 100 mg/5 mL oral liquid: 5 milliliter(s) orally every 6 hours as needed for Cough  Insulin Glargine Max Solostar Pen (concentrated) 300 units/mL subcutaneous solution: 25 unit(s) subcutaneous once a day (at bedtime)  Insulin Pen Needles, 4mm: 1 application subcutaneously 4 times a day. ** Use with insulin pen **  Januvia 100 mg oral tablet: 1 tab(s) orally once a day  lancets: 1 application subcutaneously 4 times a day  losartan 25 mg oral tablet: 1 tab(s) orally once a day  OLANZapine 20 mg oral tablet: 2 tab(s) orally once a day (at bedtime)  Spiriva HandiHaler 18 mcg inhalation capsule: 1 cap(s) inhaled once a day  Symbicort 160 mcg-4.5 mcg/inh inhalation aerosol: 2 puff(s) inhaled 2 times a day  test strips (per patient&#x27;s insurance): 1 application subcutaneously 4 times a day. ** Compatible with patient&#x27;s glucometer **

## 2025-05-09 NOTE — DISCHARGE NOTE PROVIDER - HOSPITAL COURSE
HPI:    54yo F w/a PMH of T2DM, insomnia, schizophrenia presents with complaint of elevated blood sugar  and generalized weakness.  Patient was brought in by EMS from [facility] due to blood sugar between 400-500.  States for the past 1 to 2 hours prior to arrival in emergency department she has had a productive cough.  Reports feeling weak.  Denies fever, chills, chest pain, shortness of breath, nausea/vomiting/diarrhea, focal numbness/weakness, sick contacts.     In the ED,   Vitals: /54, , RR 18, T 98.4, satting 95% on RA   Labs: WBC 8.5, Hgb 13, , Na 137, K 4.7, BUN 25, Cr 1, Glucose 475. BHB negative, UA negative, RVP negative   Imaging: CXR - No acute cardiopulmonary pathology     Given Abx and IVF in ED.    ASSESSMENT & PLAN:  #Hyperglycemia in setting of suspected Viral URI   #DM   - Pt presented w/ FS in 450's   - Has new productive cough  - RVP neg  - CXR: No radiographic evidence of acute cardiopulmonary disease.  - UA negative, Ucx neg, Bcx neg  - S/p IVF and abx in ED   - Supportive care  - Lantus, Lispro, ISS  - Monitor FS, keep b/w 140 - 180   - Held Januvia and Metformin   - A1c 11.3  - Discharge pt on Insulin Lantus, Lancets, Strips, metformin and Januvia  - Outpatient follow up with Endocrinologist Dr. Dos Santos     #Schizophrenia   - C/w Zyprexa at night time (monitor qtc)   - C/w Benztropine and Depakote   - on Fluphenazine every 2 weeks     #HTN - C/w Losartan   #HLD - c/w statin        HPI:    54yo F w/a PMH of T2DM, insomnia, schizophrenia presents with complaint of elevated blood sugar  and generalized weakness.  Patient was brought in by EMS from [facility] due to blood sugar between 400-500.  States for the past 1 to 2 hours prior to arrival in emergency department she has had a productive cough.  Reports feeling weak.  Denies fever, chills, chest pain, shortness of breath, nausea/vomiting/diarrhea, focal numbness/weakness, sick contacts.     In the ED,   Vitals: /54, , RR 18, T 98.4, satting 95% on RA   Labs: WBC 8.5, Hgb 13, , Na 137, K 4.7, BUN 25, Cr 1, Glucose 475. BHB negative, UA negative, RVP negative   Imaging: CXR - No acute cardiopulmonary pathology     Given Abx and IVF in ED.    ASSESSMENT & PLAN:  #Hyperglycemia in setting of suspected Viral URI .  - UA negative, Ucx neg, Bcx neg  - S/p IVF and abx in ED   - Supportive care  - Lantus, Lispro, ISS  - Monitor FS, keep b/w 140 - 180   - Held Januvia and Metformin   - A1c 11.3  - Discharge pt on Insulin Lantus, Lancets, Strips, metformin and Januvia  - Outpatient follow up with Endocrinologist Dr. Dos Santos     #Schizophrenia   - C/w Zyprexa at night time (monitor qtc)   - C/w Benztropine and Depakote   - on Fluphenazine every 2 weeks     #HTN - C/w Losartan   #HLD - c/w statin     5/9 Pt seen and examined independently. No new complaints. Feeling better. Denies CP, SOB, AP. Remainder ROS unremarkable.    Gen: NAD, AAOx3, obese, mild dysarthria  CV: S1 S2  Resp: occasional wheeze  GI: NT/ND/S +BS  MS: neg c/c/e, +pulses  Neuro: nonfocal, +reflexes    Discharge instructions discussed and patient knows when to seek immediate medical attention.  Patient has proper follow up.  All results discussed and patient aware they require further follow up/work up.  Stressed importance of proper follow up.  Medications prescribed and changes discussed.  All questions and concerns from patient addressed. Understanding of instructions verbalized.    Time spent in completing discharge process and coordinating care 45 minutes.    Discussed with housestaff, nursing, case mgmt

## 2025-05-09 NOTE — DISCHARGE NOTE PROVIDER - NSDCCPCAREPLAN_GEN_ALL_CORE_FT
PRINCIPAL DISCHARGE DIAGNOSIS  Diagnosis: Hyperglycemia  Assessment and Plan of Treatment: You presented with elevated glucose levels and generalized weakness.   Your blood glucose levels were monitored and managed with Iv fluids, insulin (Lantus and Lispro), and an insulin sliding scale (ISS) was implemented. Your home diabetes medications (Januvia and Metformin) were temporarily held during the hospitalization. Your A1c on admission was 11.3%. Respiratory viral panel and urinalysis were negative. Chest x-ray showed no acute cardiopulmonary disease. Your symptoms are now improved.  After discharge from the hospital, you will need to:  - Follow up with your primary care doctor within 1-2 weeks  - Follow up outpatient with your endrocrinologist within 1 week.  - Start Insulin lantus, continue with Januvia and Metformin.  - Take all the medications you were discharged with, unless otherwise instructed by your healthcare provider(s).   - Monitor your cough and report any worsening symptoms  Seek immediate medical attention if you develop fevers, chills, chest pain, shortness of breath, nausea and vomiting, abdominal pain, passing out, weakness or numbness or tingling on one side of your body, or any other concerning signs or symptoms.      SECONDARY DISCHARGE DIAGNOSES  Diagnosis: Hyperglycemia  Assessment and Plan of Treatment:     Diagnosis: Flu-like symptoms  Assessment and Plan of Treatment:      PRINCIPAL DISCHARGE DIAGNOSIS  Diagnosis: Hyperglycemia  Assessment and Plan of Treatment: You presented with elevated glucose levels and generalized weakness.   Your blood glucose levels were monitored and managed with Iv fluids, insulin (Lantus and Lispro), and an insulin sliding scale (ISS) was implemented. Your home diabetes medications (Januvia and Metformin) were temporarily held during the hospitalization. Your A1c on admission was 11.3%.   Monitor your sugars at home and adjust insulin doses with the help of your doctors with the goal of A1C <7.  Respiratory viral panel and urinalysis were negative but we suspect that you have a viral syndrome. Chest x-ray showed no acute cardiopulmonary disease. Your symptoms are now improved.  After discharge from the hospital, you will need to:  - Follow up with your primary care doctor within 1 weeks  - Follow up outpatient with your endrocrinologist within 2-4 weeks.  - Start Insulin lantus, continue with Januvia.  - Take all the medications you were discharged with, unless otherwise instructed by your healthcare provider(s).   - Monitor your cough and report any worsening symptoms  Seek immediate medical attention if you develop fevers, chills, chest pain, increased shortness of breath, nausea and vomiting, abdominal pain, passing out, weakness or numbness or tingling on one side of your body, or any other concerning signs or symptoms.      SECONDARY DISCHARGE DIAGNOSES  Diagnosis: Hyperglycemia  Assessment and Plan of Treatment:     Diagnosis: Flu-like symptoms  Assessment and Plan of Treatment:      PRINCIPAL DISCHARGE DIAGNOSIS  Diagnosis: Hyperglycemia  Assessment and Plan of Treatment: You presented with elevated glucose levels and generalized weakness.   Your blood glucose levels were monitored and managed with Iv fluids, insulin (Lantus and Lispro), and an insulin sliding scale (ISS) was implemented. Your home diabetes medications (Januvia and Metformin) were temporarily held during the hospitalization. Your A1c on admission was 11.3%.   Monitor your sugars at home and adjust insulin doses with the help of your doctors with the goal of A1C <7.  Respiratory viral panel and urinalysis were negative but we suspect that you have a viral syndrome or COPD. Chest x-ray showed no acute cardiopulmonary disease. Your symptoms are now improved. Please stop smoking. If your cough continues, please see a pulmonologist.  After discharge from the hospital, you will need to:  - Follow up with your primary care doctor within 1 weeks  - Follow up outpatient with your endrocrinologist within 2-4 weeks.  - Start Insulin lantus, continue with Januvia.  - Take all the medications you were discharged with, unless otherwise instructed by your healthcare provider(s).   - Monitor your cough and report any worsening symptoms  Seek immediate medical attention if you develop fevers, chills, chest pain, increased shortness of breath, nausea and vomiting, abdominal pain, passing out, weakness or numbness or tingling on one side of your body, or any other concerning signs or symptoms.      SECONDARY DISCHARGE DIAGNOSES  Diagnosis: Hyperglycemia  Assessment and Plan of Treatment:     Diagnosis: Flu-like symptoms  Assessment and Plan of Treatment:

## 2025-05-09 NOTE — DISCHARGE NOTE PROVIDER - CARE PROVIDER_API CALL
Purnima Dos Santos  Endocrinology/Metab/Diabetes  [address] [place] [address]  Phone: [phone]  Fax: [phone]  Follow Up Time: 1 week   Purnima Dos Santos  Endocrinology/Metab/Diabetes  [address] [place] [address]  Phone: [phone]  Fax: [phone]  Follow Up Time: 2 weeks    [facility] Doctor at [facility],   Phone: (   )    -  Fax: (   )    -  Follow Up Time: 1-3 days   Purnima Dos Santos  Endocrinology/Metab/Diabetes  [address] [place] [address]  Phone: [phone]  Fax: [phone]  Follow Up Time: 2 weeks    Elvira Steen  Pulmonary Disease  [address] [facility] [address]  [place] [address]  Phone: [phone]  Fax: [phone]  Follow Up Time: 2 weeks    Tesha White  Geriatric Medicine  [address] [place] [address]  Phone: [phone]  Fax: [phone]  Follow Up Time: 1 week

## 2025-05-09 NOTE — DISCHARGE NOTE PROVIDER - CARE PROVIDERS DIRECT ADDRESSES
[email or web address] ,[email or web address]

## 2025-05-09 NOTE — DISCHARGE NOTE PROVIDER - PROVIDER TOKENS
PROVIDER:[TOKEN:[[id number]],FOLLOWUP:[1 week]] PROVIDER:[TOKEN:[[id number]],FOLLOWUP:[2 weeks]],FREE:[LAST:[[facility]],PHONE:[(   )    -],FAX:[(   )    -],FOLLOWUP:[1-3 days]] PROVIDER:[TOKEN:[[id number]],FOLLOWUP:[2 weeks]],PROVIDER:[TOKEN:[[id number]],FOLLOWUP:[2 weeks]],PROVIDER:[TOKEN:[[id number]],FOLLOWUP:[1 week]]

## 2025-05-09 NOTE — DISCHARGE NOTE NURSING/CASE MANAGEMENT/SOCIAL WORK - PATIENT PORTAL LINK FT
You can access the FollowMyHealth Patient Portal offered by [facility] by registering at the following website: [email or web address]. By joining [facility]’s FollowMyHealth portal, you will also be able to view your health information using other applications (apps) compatible with our system.

## 2025-05-09 NOTE — PHYSICAL THERAPY INITIAL EVALUATION ADULT - SPECIFY REASON(S)
905 am PT attempted to see pt for evaluation, however, pt appears very somnolent; unable to participate at this time . Will f/u ;

## 2025-05-09 NOTE — DISCHARGE NOTE NURSING/CASE MANAGEMENT/SOCIAL WORK - FINANCIAL ASSISTANCE
[facility] provides services at a reduced cost to those who are determined to be eligible through [facility]’s financial assistance program. Information regarding [facility]’s financial assistance program can be found by going to [email or web address] or by calling [phone].

## 2025-05-12 RX ORDER — FLUPHENAZINE HCL 10 MG
50 TABLET ORAL
Refills: 0 | DISCHARGE

## 2025-05-12 RX ORDER — LOSARTAN POTASSIUM 100 MG/1
1 TABLET, FILM COATED ORAL
Refills: 0 | DISCHARGE

## 2025-05-12 RX ORDER — DOCUSATE SODIUM 100 MG
1 CAPSULE ORAL
Refills: 0 | DISCHARGE

## 2025-05-12 RX ORDER — BISACODYL 5 MG
1 TABLET, DELAYED RELEASE (ENTERIC COATED) ORAL
Refills: 0 | DISCHARGE

## 2025-05-12 RX ORDER — ATORVASTATIN CALCIUM 80 MG/1
1 TABLET, FILM COATED ORAL
Refills: 0 | DISCHARGE

## 2025-05-12 RX ORDER — METFORMIN HYDROCHLORIDE 850 MG/1
1 TABLET ORAL
Refills: 0 | DISCHARGE

## 2025-05-12 RX ORDER — SITAGLIPTIN 100 MG/1
1 TABLET, FILM COATED ORAL
Refills: 0 | DISCHARGE

## 2025-05-12 RX ORDER — BENZTROPINE MESYLATE 2 MG
1 TABLET ORAL
Refills: 0 | DISCHARGE

## 2025-05-12 RX ORDER — OLANZAPINE 10 MG/1
2 TABLET ORAL
Refills: 0 | DISCHARGE

## 2025-05-13 LAB
CULTURE RESULTS: SIGNIFICANT CHANGE UP
CULTURE RESULTS: SIGNIFICANT CHANGE UP
SPECIMEN SOURCE: SIGNIFICANT CHANGE UP
SPECIMEN SOURCE: SIGNIFICANT CHANGE UP

## 2025-05-23 DIAGNOSIS — J06.9 ACUTE UPPER RESPIRATORY INFECTION, UNSPECIFIED: ICD-10-CM

## 2025-05-23 DIAGNOSIS — G47.00 INSOMNIA, UNSPECIFIED: ICD-10-CM

## 2025-05-23 DIAGNOSIS — F20.9 SCHIZOPHRENIA, UNSPECIFIED: ICD-10-CM

## 2025-05-23 DIAGNOSIS — Z79.84 LONG TERM (CURRENT) USE OF ORAL HYPOGLYCEMIC DRUGS: ICD-10-CM

## 2025-05-23 DIAGNOSIS — Z88.0 ALLERGY STATUS TO PENICILLIN: ICD-10-CM

## 2025-05-23 DIAGNOSIS — E78.5 HYPERLIPIDEMIA, UNSPECIFIED: ICD-10-CM

## 2025-05-23 DIAGNOSIS — Z88.8 ALLERGY STATUS TO OTHER DRUGS, MEDICAMENTS AND BIOLOGICAL SUBSTANCES: ICD-10-CM

## 2025-05-23 DIAGNOSIS — I10 ESSENTIAL (PRIMARY) HYPERTENSION: ICD-10-CM

## 2025-05-23 DIAGNOSIS — J44.9 CHRONIC OBSTRUCTIVE PULMONARY DISEASE, UNSPECIFIED: ICD-10-CM

## 2025-09-08 ENCOUNTER — EMERGENCY (EMERGENCY)
Facility: HOSPITAL | Age: 55
LOS: 0 days | Discharge: HOME | End: 2025-09-09
Attending: EMERGENCY MEDICINE
Payer: MEDICARE

## 2025-09-08 VITALS
DIASTOLIC BLOOD PRESSURE: 85 MMHG | RESPIRATION RATE: 18 BRPM | WEIGHT: 251.99 LBS | TEMPERATURE: 98 F | SYSTOLIC BLOOD PRESSURE: 136 MMHG | OXYGEN SATURATION: 94 % | HEART RATE: 87 BPM | HEIGHT: 62 IN

## 2025-09-08 VITALS
HEART RATE: 87 BPM | DIASTOLIC BLOOD PRESSURE: 90 MMHG | SYSTOLIC BLOOD PRESSURE: 139 MMHG | OXYGEN SATURATION: 94 % | RESPIRATION RATE: 18 BRPM | TEMPERATURE: 98 F

## 2025-09-08 DIAGNOSIS — Z88.6 ALLERGY STATUS TO ANALGESIC AGENT: ICD-10-CM

## 2025-09-08 DIAGNOSIS — R63.4 ABNORMAL WEIGHT LOSS: ICD-10-CM

## 2025-09-08 DIAGNOSIS — E11.65 TYPE 2 DIABETES MELLITUS WITH HYPERGLYCEMIA: ICD-10-CM

## 2025-09-08 DIAGNOSIS — Z88.0 ALLERGY STATUS TO PENICILLIN: ICD-10-CM

## 2025-09-08 DIAGNOSIS — Z88.8 ALLERGY STATUS TO OTHER DRUGS, MEDICAMENTS AND BIOLOGICAL SUBSTANCES: ICD-10-CM

## 2025-09-08 DIAGNOSIS — F17.200 NICOTINE DEPENDENCE, UNSPECIFIED, UNCOMPLICATED: ICD-10-CM

## 2025-09-08 PROCEDURE — 84295 ASSAY OF SERUM SODIUM: CPT

## 2025-09-08 PROCEDURE — 82010 KETONE BODYS QUAN: CPT

## 2025-09-08 PROCEDURE — 82330 ASSAY OF CALCIUM: CPT

## 2025-09-08 PROCEDURE — 81003 URINALYSIS AUTO W/O SCOPE: CPT

## 2025-09-08 PROCEDURE — 36415 COLL VENOUS BLD VENIPUNCTURE: CPT

## 2025-09-08 PROCEDURE — 36000 PLACE NEEDLE IN VEIN: CPT

## 2025-09-08 PROCEDURE — 83605 ASSAY OF LACTIC ACID: CPT

## 2025-09-08 PROCEDURE — 85025 COMPLETE CBC W/AUTO DIFF WBC: CPT

## 2025-09-08 PROCEDURE — 82803 BLOOD GASES ANY COMBINATION: CPT

## 2025-09-08 PROCEDURE — 99283 EMERGENCY DEPT VISIT LOW MDM: CPT | Mod: 25

## 2025-09-08 PROCEDURE — 80053 COMPREHEN METABOLIC PANEL: CPT

## 2025-09-08 PROCEDURE — 84132 ASSAY OF SERUM POTASSIUM: CPT

## 2025-09-08 PROCEDURE — 85018 HEMOGLOBIN: CPT

## 2025-09-08 PROCEDURE — 99284 EMERGENCY DEPT VISIT MOD MDM: CPT | Mod: FS

## 2025-09-08 PROCEDURE — 82962 GLUCOSE BLOOD TEST: CPT

## 2025-09-08 PROCEDURE — 85014 HEMATOCRIT: CPT

## 2025-09-08 RX ORDER — SODIUM CHLORIDE 9 G/1000ML
2000 INJECTION, SOLUTION INTRAVENOUS ONCE
Refills: 0 | Status: COMPLETED | OUTPATIENT
Start: 2025-09-08 | End: 2025-09-08

## 2025-09-08 RX ADMIN — SODIUM CHLORIDE 2000 MILLILITER(S): 9 INJECTION, SOLUTION INTRAVENOUS at 23:24

## 2025-09-09 LAB
ALBUMIN SERPL ELPH-MCNC: 3.5 G/DL — SIGNIFICANT CHANGE UP (ref 3.5–5.2)
ALP SERPL-CCNC: 88 U/L — SIGNIFICANT CHANGE UP (ref 30–115)
ALT FLD-CCNC: 28 U/L — SIGNIFICANT CHANGE UP (ref 0–41)
ANION GAP SERPL CALC-SCNC: 10 MMOL/L — SIGNIFICANT CHANGE UP (ref 7–14)
APPEARANCE UR: CLEAR — SIGNIFICANT CHANGE UP
AST SERPL-CCNC: 33 U/L — SIGNIFICANT CHANGE UP (ref 0–41)
B-OH-BUTYR SERPL-SCNC: <0.2 MMOL/L — SIGNIFICANT CHANGE UP
BASE EXCESS BLDV CALC-SCNC: 2.7 MMOL/L — SIGNIFICANT CHANGE UP (ref -2–3)
BASOPHILS # BLD AUTO: 0.06 K/UL — SIGNIFICANT CHANGE UP (ref 0–0.2)
BASOPHILS NFR BLD AUTO: 0.7 % — SIGNIFICANT CHANGE UP (ref 0–1)
BILIRUB SERPL-MCNC: <0.2 MG/DL — SIGNIFICANT CHANGE UP (ref 0.2–1.2)
BILIRUB UR-MCNC: NEGATIVE — SIGNIFICANT CHANGE UP
BUN SERPL-MCNC: 19 MG/DL — SIGNIFICANT CHANGE UP (ref 10–20)
CA-I SERPL-SCNC: 1.35 MMOL/L — HIGH (ref 1.15–1.33)
CALCIUM SERPL-MCNC: 9.9 MG/DL — SIGNIFICANT CHANGE UP (ref 8.4–10.5)
CHLORIDE SERPL-SCNC: 104 MMOL/L — SIGNIFICANT CHANGE UP (ref 98–110)
CO2 SERPL-SCNC: 27 MMOL/L — SIGNIFICANT CHANGE UP (ref 17–32)
COLOR SPEC: YELLOW — SIGNIFICANT CHANGE UP
CREAT SERPL-MCNC: 1 MG/DL — SIGNIFICANT CHANGE UP (ref 0.7–1.5)
DIFF PNL FLD: NEGATIVE — SIGNIFICANT CHANGE UP
EGFR: 67 ML/MIN/1.73M2 — SIGNIFICANT CHANGE UP
EGFR: 67 ML/MIN/1.73M2 — SIGNIFICANT CHANGE UP
EOSINOPHIL # BLD AUTO: 0.25 K/UL — SIGNIFICANT CHANGE UP (ref 0–0.7)
EOSINOPHIL NFR BLD AUTO: 3 % — SIGNIFICANT CHANGE UP (ref 0–8)
GAS PNL BLDV: 137 MMOL/L — SIGNIFICANT CHANGE UP (ref 136–145)
GAS PNL BLDV: SIGNIFICANT CHANGE UP
GLUCOSE SERPL-MCNC: 372 MG/DL — HIGH (ref 70–99)
GLUCOSE UR QL: >=1000 MG/DL
HCO3 BLDV-SCNC: 29 MMOL/L — SIGNIFICANT CHANGE UP (ref 22–29)
HCT VFR BLD CALC: 39.5 % — SIGNIFICANT CHANGE UP (ref 37–47)
HCT VFR BLDA CALC: 35 % — SIGNIFICANT CHANGE UP (ref 34.5–46.5)
HGB BLD CALC-MCNC: 11.8 G/DL — SIGNIFICANT CHANGE UP (ref 11.7–16.1)
HGB BLD-MCNC: 12.6 G/DL — SIGNIFICANT CHANGE UP (ref 12–16)
IMM GRANULOCYTES NFR BLD AUTO: 1.1 % — HIGH (ref 0.1–0.3)
KETONES UR QL: ABNORMAL MG/DL
LACTATE BLDV-MCNC: 1.1 MMOL/L — SIGNIFICANT CHANGE UP (ref 0.5–2)
LEUKOCYTE ESTERASE UR-ACNC: NEGATIVE — SIGNIFICANT CHANGE UP
LYMPHOCYTES # BLD AUTO: 2.02 K/UL — SIGNIFICANT CHANGE UP (ref 1.2–3.4)
LYMPHOCYTES # BLD AUTO: 24 % — SIGNIFICANT CHANGE UP (ref 20.5–51.1)
MCHC RBC-ENTMCNC: 30.1 PG — SIGNIFICANT CHANGE UP (ref 27–31)
MCHC RBC-ENTMCNC: 31.9 G/DL — LOW (ref 32–37)
MCV RBC AUTO: 94.5 FL — SIGNIFICANT CHANGE UP (ref 81–99)
MONOCYTES # BLD AUTO: 0.78 K/UL — HIGH (ref 0.1–0.6)
MONOCYTES NFR BLD AUTO: 9.3 % — SIGNIFICANT CHANGE UP (ref 1.7–9.3)
NEUTROPHILS # BLD AUTO: 5.2 K/UL — SIGNIFICANT CHANGE UP (ref 1.4–6.5)
NEUTROPHILS NFR BLD AUTO: 61.9 % — SIGNIFICANT CHANGE UP (ref 42.2–75.2)
NITRITE UR-MCNC: NEGATIVE — SIGNIFICANT CHANGE UP
NRBC BLD AUTO-RTO: 0 /100 WBCS — SIGNIFICANT CHANGE UP (ref 0–0)
PCO2 BLDV: 53 MMHG — HIGH (ref 39–42)
PH BLDV: 7.35 — SIGNIFICANT CHANGE UP (ref 7.32–7.43)
PH UR: 7 — SIGNIFICANT CHANGE UP (ref 5–8)
PLATELET # BLD AUTO: 340 K/UL — SIGNIFICANT CHANGE UP (ref 130–400)
PMV BLD: 10.1 FL — SIGNIFICANT CHANGE UP (ref 7.4–10.4)
PO2 BLDV: 46 MMHG — HIGH (ref 25–45)
POTASSIUM BLDV-SCNC: 4.1 MMOL/L — SIGNIFICANT CHANGE UP (ref 3.5–5.1)
POTASSIUM SERPL-MCNC: 4.6 MMOL/L — SIGNIFICANT CHANGE UP (ref 3.5–5)
POTASSIUM SERPL-SCNC: 4.6 MMOL/L — SIGNIFICANT CHANGE UP (ref 3.5–5)
PROT SERPL-MCNC: 6.8 G/DL — SIGNIFICANT CHANGE UP (ref 6–8)
PROT UR-MCNC: NEGATIVE MG/DL — SIGNIFICANT CHANGE UP
RBC # BLD: 4.18 M/UL — LOW (ref 4.2–5.4)
RBC # FLD: 15.2 % — HIGH (ref 11.5–14.5)
SAO2 % BLDV: 78.2 % — SIGNIFICANT CHANGE UP (ref 67–88)
SODIUM SERPL-SCNC: 141 MMOL/L — SIGNIFICANT CHANGE UP (ref 135–146)
SP GR SPEC: >1.03 — HIGH (ref 1–1.03)
UROBILINOGEN FLD QL: 0.2 MG/DL — SIGNIFICANT CHANGE UP (ref 0.2–1)
WBC # BLD: 8.4 K/UL — SIGNIFICANT CHANGE UP (ref 4.8–10.8)
WBC # FLD AUTO: 8.4 K/UL — SIGNIFICANT CHANGE UP (ref 4.8–10.8)

## 2025-09-11 ENCOUNTER — APPOINTMENT (OUTPATIENT)
Dept: ENDOCRINOLOGY | Facility: CLINIC | Age: 55
End: 2025-09-11
Payer: MEDICARE

## 2025-09-11 VITALS
WEIGHT: 260 LBS | SYSTOLIC BLOOD PRESSURE: 126 MMHG | BODY MASS INDEX: 46.07 KG/M2 | DIASTOLIC BLOOD PRESSURE: 82 MMHG | HEART RATE: 88 BPM | HEIGHT: 63 IN

## 2025-09-11 DIAGNOSIS — E66.01 MORBID (SEVERE) OBESITY DUE TO EXCESS CALORIES: ICD-10-CM

## 2025-09-11 DIAGNOSIS — E78.5 HYPERLIPIDEMIA, UNSPECIFIED: ICD-10-CM

## 2025-09-11 DIAGNOSIS — E11.65 TYPE 2 DIABETES MELLITUS WITH HYPERGLYCEMIA: ICD-10-CM

## 2025-09-11 PROCEDURE — 99204 OFFICE O/P NEW MOD 45 MIN: CPT

## 2025-09-11 RX ORDER — TIRZEPATIDE 2.5 MG/.5ML
2.5 INJECTION, SOLUTION SUBCUTANEOUS
Qty: 1 | Refills: 5 | Status: ACTIVE | COMMUNITY
Start: 2025-09-11 | End: 1900-01-01